# Patient Record
Sex: FEMALE | Race: BLACK OR AFRICAN AMERICAN | Employment: OTHER | URBAN - METROPOLITAN AREA
[De-identification: names, ages, dates, MRNs, and addresses within clinical notes are randomized per-mention and may not be internally consistent; named-entity substitution may affect disease eponyms.]

---

## 2021-09-15 ENCOUNTER — HOSPITAL ENCOUNTER (INPATIENT)
Age: 71
LOS: 20 days | Discharge: NURSING, REHAB OR LONG-TERM CARE | DRG: 870 | End: 2021-10-05
Attending: EMERGENCY MEDICINE | Admitting: INTERNAL MEDICINE
Payer: MEDICARE

## 2021-09-15 ENCOUNTER — APPOINTMENT (OUTPATIENT)
Dept: GENERAL RADIOLOGY | Age: 71
DRG: 870 | End: 2021-09-15
Payer: MEDICARE

## 2021-09-15 DIAGNOSIS — Z20.822 SUSPECTED COVID-19 VIRUS INFECTION: ICD-10-CM

## 2021-09-15 DIAGNOSIS — J18.9 PNEUMONIA OF LEFT LOWER LOBE DUE TO INFECTIOUS ORGANISM: Primary | ICD-10-CM

## 2021-09-15 DIAGNOSIS — E87.20 METABOLIC ACIDOSIS: ICD-10-CM

## 2021-09-15 DIAGNOSIS — J96.01 ACUTE RESPIRATORY FAILURE WITH HYPOXIA (HCC): ICD-10-CM

## 2021-09-15 DIAGNOSIS — N17.9 ACUTE RENAL FAILURE, UNSPECIFIED ACUTE RENAL FAILURE TYPE (HCC): ICD-10-CM

## 2021-09-15 LAB
ANION GAP SERPL CALCULATED.3IONS-SCNC: 26 MMOL/L (ref 3–16)
BACTERIA: ABNORMAL /HPF
BASE EXCESS VENOUS: -14.7 MMOL/L (ref -2–3)
BASE EXCESS VENOUS: -16.3 MMOL/L (ref -2–3)
BASOPHILS ABSOLUTE: 0 K/UL (ref 0–0.2)
BASOPHILS RELATIVE PERCENT: 0.2 %
BILIRUBIN URINE: NEGATIVE
BLOOD, URINE: ABNORMAL
BUN BLDV-MCNC: 77 MG/DL (ref 7–20)
CALCIUM SERPL-MCNC: 8.7 MG/DL (ref 8.3–10.6)
CARBOXYHEMOGLOBIN: 0.8 % (ref 0–1.5)
CARBOXYHEMOGLOBIN: 1.1 % (ref 0–1.5)
CHLORIDE BLD-SCNC: 92 MMOL/L (ref 99–110)
CLARITY: ABNORMAL
CO2: 8 MMOL/L (ref 21–32)
COLOR: YELLOW
CREAT SERPL-MCNC: 7.8 MG/DL (ref 0.6–1.2)
EOSINOPHILS ABSOLUTE: 0 K/UL (ref 0–0.6)
EOSINOPHILS RELATIVE PERCENT: 0 %
EPITHELIAL CELLS, UA: ABNORMAL /HPF (ref 0–5)
GFR AFRICAN AMERICAN: 6
GFR NON-AFRICAN AMERICAN: 5
GLUCOSE BLD-MCNC: 199 MG/DL (ref 70–99)
GLUCOSE URINE: NEGATIVE MG/DL
HCO3 VENOUS: 11.8 MMOL/L (ref 24–28)
HCO3 VENOUS: 12.7 MMOL/L (ref 24–28)
HCT VFR BLD CALC: 36.8 % (ref 36–48)
HEMOGLOBIN, VEN, REDUCED: 40 %
HEMOGLOBIN: 11.9 G/DL (ref 12–16)
KETONES, URINE: NEGATIVE MG/DL
LACTIC ACID, SEPSIS: 2.1 MMOL/L (ref 0.4–1.9)
LACTIC ACID, SEPSIS: 3.2 MMOL/L (ref 0.4–1.9)
LEUKOCYTE ESTERASE, URINE: ABNORMAL
LIPASE: 214 U/L (ref 13–60)
LYMPHOCYTES ABSOLUTE: 0.2 K/UL (ref 1–5.1)
LYMPHOCYTES RELATIVE PERCENT: 1.3 %
MCH RBC QN AUTO: 31.3 PG (ref 26–34)
MCHC RBC AUTO-ENTMCNC: 32.5 G/DL (ref 31–36)
MCV RBC AUTO: 96.5 FL (ref 80–100)
METHEMOGLOBIN VENOUS: 0.1 % (ref 0–1.5)
METHEMOGLOBIN VENOUS: 0.1 % (ref 0–1.5)
MICROSCOPIC EXAMINATION: YES
MONOCYTES ABSOLUTE: 0.9 K/UL (ref 0–1.3)
MONOCYTES RELATIVE PERCENT: 5 %
MUCUS: ABNORMAL /LPF
NEUTROPHILS ABSOLUTE: 16.9 K/UL (ref 1.7–7.7)
NEUTROPHILS RELATIVE PERCENT: 93.5 %
NITRITE, URINE: NEGATIVE
O2 SAT, VEN: 54 %
O2 SAT, VEN: 60 %
PCO2, VEN: 34.9 MMHG (ref 41–51)
PCO2, VEN: 35 MMHG (ref 41–51)
PDW BLD-RTO: 16.4 % (ref 12.4–15.4)
PH UA: 6 (ref 5–8)
PH VENOUS: 7.14 (ref 7.35–7.45)
PH VENOUS: 7.17 (ref 7.35–7.45)
PLATELET # BLD: 320 K/UL (ref 135–450)
PMV BLD AUTO: 8.5 FL (ref 5–10.5)
PO2, VEN: 36.1 MMHG (ref 25–40)
PO2, VEN: 36.9 MMHG (ref 25–40)
POTASSIUM REFLEX MAGNESIUM: 4.5 MMOL/L (ref 3.5–5.1)
PRO-BNP: ABNORMAL PG/ML (ref 0–124)
PROTEIN UA: >=300 MG/DL
RBC # BLD: 3.81 M/UL (ref 4–5.2)
RBC UA: ABNORMAL /HPF (ref 0–4)
SODIUM BLD-SCNC: 126 MMOL/L (ref 136–145)
SPECIFIC GRAVITY UA: 1.02 (ref 1–1.03)
TCO2 CALC VENOUS: 13 MMOL/L
TCO2 CALC VENOUS: 14 MMOL/L
TOTAL CK: 851 U/L (ref 26–192)
TROPONIN: 0.14 NG/ML
URINE TYPE: ABNORMAL
UROBILINOGEN, URINE: 0.2 E.U./DL
WBC # BLD: 18.1 K/UL (ref 4–11)
WBC UA: >100 /HPF (ref 0–5)

## 2021-09-15 PROCEDURE — 96361 HYDRATE IV INFUSION ADD-ON: CPT

## 2021-09-15 PROCEDURE — 81001 URINALYSIS AUTO W/SCOPE: CPT

## 2021-09-15 PROCEDURE — 2500000003 HC RX 250 WO HCPCS

## 2021-09-15 PROCEDURE — 94660 CPAP INITIATION&MGMT: CPT

## 2021-09-15 PROCEDURE — U0005 INFEC AGEN DETEC AMPLI PROBE: HCPCS

## 2021-09-15 PROCEDURE — 2000000000 HC ICU R&B

## 2021-09-15 PROCEDURE — 36556 INSERT NON-TUNNEL CV CATH: CPT

## 2021-09-15 PROCEDURE — 86850 RBC ANTIBODY SCREEN: CPT

## 2021-09-15 PROCEDURE — 6360000002 HC RX W HCPCS: Performed by: STUDENT IN AN ORGANIZED HEALTH CARE EDUCATION/TRAINING PROGRAM

## 2021-09-15 PROCEDURE — 87186 SC STD MICRODIL/AGAR DIL: CPT

## 2021-09-15 PROCEDURE — 71045 X-RAY EXAM CHEST 1 VIEW: CPT

## 2021-09-15 PROCEDURE — 02HV33Z INSERTION OF INFUSION DEVICE INTO SUPERIOR VENA CAVA, PERCUTANEOUS APPROACH: ICD-10-PCS | Performed by: SURGERY

## 2021-09-15 PROCEDURE — 96365 THER/PROPH/DIAG IV INF INIT: CPT

## 2021-09-15 PROCEDURE — 86901 BLOOD TYPING SEROLOGIC RH(D): CPT

## 2021-09-15 PROCEDURE — 84484 ASSAY OF TROPONIN QUANT: CPT

## 2021-09-15 PROCEDURE — 85025 COMPLETE CBC W/AUTO DIFF WBC: CPT

## 2021-09-15 PROCEDURE — 96375 TX/PRO/DX INJ NEW DRUG ADDON: CPT

## 2021-09-15 PROCEDURE — 87077 CULTURE AEROBIC IDENTIFY: CPT

## 2021-09-15 PROCEDURE — 87040 BLOOD CULTURE FOR BACTERIA: CPT

## 2021-09-15 PROCEDURE — 99284 EMERGENCY DEPT VISIT MOD MDM: CPT

## 2021-09-15 PROCEDURE — 82803 BLOOD GASES ANY COMBINATION: CPT

## 2021-09-15 PROCEDURE — 2500000003 HC RX 250 WO HCPCS: Performed by: SURGERY

## 2021-09-15 PROCEDURE — 82550 ASSAY OF CK (CPK): CPT

## 2021-09-15 PROCEDURE — 94640 AIRWAY INHALATION TREATMENT: CPT

## 2021-09-15 PROCEDURE — 2500000003 HC RX 250 WO HCPCS: Performed by: STUDENT IN AN ORGANIZED HEALTH CARE EDUCATION/TRAINING PROGRAM

## 2021-09-15 PROCEDURE — 83690 ASSAY OF LIPASE: CPT

## 2021-09-15 PROCEDURE — 2580000003 HC RX 258: Performed by: SURGERY

## 2021-09-15 PROCEDURE — 86900 BLOOD TYPING SEROLOGIC ABO: CPT

## 2021-09-15 PROCEDURE — 96368 THER/DIAG CONCURRENT INF: CPT

## 2021-09-15 PROCEDURE — 36415 COLL VENOUS BLD VENIPUNCTURE: CPT

## 2021-09-15 PROCEDURE — 83880 ASSAY OF NATRIURETIC PEPTIDE: CPT

## 2021-09-15 PROCEDURE — XW033E5 INTRODUCTION OF REMDESIVIR ANTI-INFECTIVE INTO PERIPHERAL VEIN, PERCUTANEOUS APPROACH, NEW TECHNOLOGY GROUP 5: ICD-10-PCS | Performed by: SURGERY

## 2021-09-15 PROCEDURE — 80048 BASIC METABOLIC PNL TOTAL CA: CPT

## 2021-09-15 PROCEDURE — 87086 URINE CULTURE/COLONY COUNT: CPT

## 2021-09-15 PROCEDURE — 2580000003 HC RX 258: Performed by: STUDENT IN AN ORGANIZED HEALTH CARE EDUCATION/TRAINING PROGRAM

## 2021-09-15 PROCEDURE — U0003 INFECTIOUS AGENT DETECTION BY NUCLEIC ACID (DNA OR RNA); SEVERE ACUTE RESPIRATORY SYNDROME CORONAVIRUS 2 (SARS-COV-2) (CORONAVIRUS DISEASE [COVID-19]), AMPLIFIED PROBE TECHNIQUE, MAKING USE OF HIGH THROUGHPUT TECHNOLOGIES AS DESCRIBED BY CMS-2020-01-R: HCPCS

## 2021-09-15 PROCEDURE — 6370000000 HC RX 637 (ALT 250 FOR IP): Performed by: STUDENT IN AN ORGANIZED HEALTH CARE EDUCATION/TRAINING PROGRAM

## 2021-09-15 PROCEDURE — 83605 ASSAY OF LACTIC ACID: CPT

## 2021-09-15 PROCEDURE — 5A1D90Z PERFORMANCE OF URINARY FILTRATION, CONTINUOUS, GREATER THAN 18 HOURS PER DAY: ICD-10-PCS | Performed by: HOSPITALIST

## 2021-09-15 RX ORDER — SODIUM CHLORIDE 9 MG/ML
25 INJECTION, SOLUTION INTRAVENOUS PRN
Status: DISCONTINUED | OUTPATIENT
Start: 2021-09-15 | End: 2021-10-05 | Stop reason: HOSPADM

## 2021-09-15 RX ORDER — ACETAMINOPHEN 325 MG/1
650 TABLET ORAL EVERY 6 HOURS PRN
Status: DISCONTINUED | OUTPATIENT
Start: 2021-09-15 | End: 2021-10-05 | Stop reason: HOSPADM

## 2021-09-15 RX ORDER — IPRATROPIUM BROMIDE AND ALBUTEROL SULFATE 2.5; .5 MG/3ML; MG/3ML
1 SOLUTION RESPIRATORY (INHALATION) ONCE
Status: COMPLETED | OUTPATIENT
Start: 2021-09-15 | End: 2021-09-15

## 2021-09-15 RX ORDER — IBUPROFEN 600 MG/1
TABLET ORAL
Status: ON HOLD | COMMUNITY
Start: 2021-08-02 | End: 2021-10-05 | Stop reason: HOSPADM

## 2021-09-15 RX ORDER — DEXAMETHASONE SODIUM PHOSPHATE 4 MG/ML
6 INJECTION, SOLUTION INTRA-ARTICULAR; INTRALESIONAL; INTRAMUSCULAR; INTRAVENOUS; SOFT TISSUE ONCE
Status: COMPLETED | OUTPATIENT
Start: 2021-09-15 | End: 2021-09-15

## 2021-09-15 RX ORDER — ATENOLOL 25 MG/1
25 TABLET ORAL DAILY
COMMUNITY
End: 2022-04-12 | Stop reason: SDUPTHER

## 2021-09-15 RX ORDER — SODIUM CHLORIDE 0.9 % (FLUSH) 0.9 %
5-40 SYRINGE (ML) INJECTION EVERY 12 HOURS SCHEDULED
Status: DISCONTINUED | OUTPATIENT
Start: 2021-09-15 | End: 2021-10-05 | Stop reason: HOSPADM

## 2021-09-15 RX ORDER — POLYETHYLENE GLYCOL 3350 17 G/17G
17 POWDER, FOR SOLUTION ORAL DAILY PRN
Status: DISCONTINUED | OUTPATIENT
Start: 2021-09-15 | End: 2021-10-05 | Stop reason: HOSPADM

## 2021-09-15 RX ORDER — ALBUTEROL SULFATE 90 UG/1
AEROSOL, METERED RESPIRATORY (INHALATION)
COMMUNITY
Start: 2021-09-07 | End: 2022-06-27 | Stop reason: ALTCHOICE

## 2021-09-15 RX ORDER — SODIUM CHLORIDE 0.9 % (FLUSH) 0.9 %
5-40 SYRINGE (ML) INJECTION PRN
Status: DISCONTINUED | OUTPATIENT
Start: 2021-09-15 | End: 2021-10-05 | Stop reason: HOSPADM

## 2021-09-15 RX ORDER — NITROGLYCERIN 20 MG/100ML
5-200 INJECTION INTRAVENOUS CONTINUOUS
Status: DISCONTINUED | OUTPATIENT
Start: 2021-09-15 | End: 2021-09-16

## 2021-09-15 RX ORDER — ACETAMINOPHEN 650 MG/1
650 SUPPOSITORY RECTAL EVERY 6 HOURS PRN
Status: DISCONTINUED | OUTPATIENT
Start: 2021-09-15 | End: 2021-10-05 | Stop reason: HOSPADM

## 2021-09-15 RX ORDER — ONDANSETRON 2 MG/ML
4 INJECTION INTRAMUSCULAR; INTRAVENOUS EVERY 6 HOURS PRN
Status: DISCONTINUED | OUTPATIENT
Start: 2021-09-15 | End: 2021-10-05 | Stop reason: HOSPADM

## 2021-09-15 RX ORDER — ALBUTEROL SULFATE 2.5 MG/3ML
2.5 SOLUTION RESPIRATORY (INHALATION) ONCE
Status: COMPLETED | OUTPATIENT
Start: 2021-09-15 | End: 2021-09-15

## 2021-09-15 RX ORDER — 0.9 % SODIUM CHLORIDE 0.9 %
1000 INTRAVENOUS SOLUTION INTRAVENOUS ONCE
Status: COMPLETED | OUTPATIENT
Start: 2021-09-15 | End: 2021-09-15

## 2021-09-15 RX ORDER — ONDANSETRON 4 MG/1
4 TABLET, ORALLY DISINTEGRATING ORAL EVERY 8 HOURS PRN
Status: DISCONTINUED | OUTPATIENT
Start: 2021-09-15 | End: 2021-10-05 | Stop reason: HOSPADM

## 2021-09-15 RX ORDER — HEPARIN SODIUM 5000 [USP'U]/ML
5000 INJECTION, SOLUTION INTRAVENOUS; SUBCUTANEOUS EVERY 8 HOURS SCHEDULED
Status: DISCONTINUED | OUTPATIENT
Start: 2021-09-15 | End: 2021-10-05 | Stop reason: HOSPADM

## 2021-09-15 RX ADMIN — IPRATROPIUM BROMIDE AND ALBUTEROL SULFATE 1 AMPULE: .5; 3 SOLUTION RESPIRATORY (INHALATION) at 20:33

## 2021-09-15 RX ADMIN — NITROGLYCERIN 5 MCG/MIN: 20 INJECTION INTRAVENOUS at 21:50

## 2021-09-15 RX ADMIN — SODIUM BICARBONATE 50 MEQ: 84 INJECTION, SOLUTION INTRAVENOUS at 21:19

## 2021-09-15 RX ADMIN — VANCOMYCIN HYDROCHLORIDE 1500 MG: 10 INJECTION, POWDER, LYOPHILIZED, FOR SOLUTION INTRAVENOUS at 23:09

## 2021-09-15 RX ADMIN — CEFTRIAXONE SODIUM 2000 MG: 2 INJECTION, POWDER, FOR SOLUTION INTRAMUSCULAR; INTRAVENOUS at 20:43

## 2021-09-15 RX ADMIN — SODIUM BICARBONATE: 84 INJECTION, SOLUTION INTRAVENOUS at 22:22

## 2021-09-15 RX ADMIN — SODIUM CHLORIDE 1000 ML: 9 INJECTION, SOLUTION INTRAVENOUS at 20:36

## 2021-09-15 RX ADMIN — Medication 50 MEQ: at 21:19

## 2021-09-15 RX ADMIN — DEXAMETHASONE SODIUM PHOSPHATE 6 MG: 4 INJECTION, SOLUTION INTRAMUSCULAR; INTRAVENOUS at 20:38

## 2021-09-15 RX ADMIN — ALBUTEROL SULFATE 2.5 MG: 2.5 SOLUTION RESPIRATORY (INHALATION) at 20:33

## 2021-09-15 RX ADMIN — AZITHROMYCIN MONOHYDRATE 500 MG: 500 INJECTION, POWDER, LYOPHILIZED, FOR SOLUTION INTRAVENOUS at 20:39

## 2021-09-16 ENCOUNTER — APPOINTMENT (OUTPATIENT)
Dept: GENERAL RADIOLOGY | Age: 71
DRG: 870 | End: 2021-09-16
Payer: MEDICARE

## 2021-09-16 LAB
A/G RATIO: 0.5 (ref 1.1–2.2)
A/G RATIO: 0.6 (ref 1.1–2.2)
ABO/RH: NORMAL
ALBUMIN SERPL-MCNC: 2.1 G/DL (ref 3.4–5)
ALBUMIN SERPL-MCNC: 2.6 G/DL (ref 3.4–5)
ALBUMIN SERPL-MCNC: 2.7 G/DL (ref 3.4–5)
ALP BLD-CCNC: 103 U/L (ref 40–129)
ALP BLD-CCNC: 116 U/L (ref 40–129)
ALP BLD-CCNC: 94 U/L (ref 40–129)
ALT SERPL-CCNC: 18 U/L (ref 10–40)
ALT SERPL-CCNC: 19 U/L (ref 10–40)
ALT SERPL-CCNC: 20 U/L (ref 10–40)
ANION GAP SERPL CALCULATED.3IONS-SCNC: 17 MMOL/L (ref 3–16)
ANION GAP SERPL CALCULATED.3IONS-SCNC: 21 MMOL/L (ref 3–16)
ANION GAP SERPL CALCULATED.3IONS-SCNC: 22 MMOL/L (ref 3–16)
ANION GAP SERPL CALCULATED.3IONS-SCNC: 26 MMOL/L (ref 3–16)
ANISOCYTOSIS: ABNORMAL
ANTIBODY IDENTIFICATION: NORMAL
ANTIBODY SCREEN: NORMAL
AST SERPL-CCNC: 37 U/L (ref 15–37)
AST SERPL-CCNC: 39 U/L (ref 15–37)
AST SERPL-CCNC: 40 U/L (ref 15–37)
BANDED NEUTROPHILS RELATIVE PERCENT: 2 % (ref 0–7)
BASE EXCESS ARTERIAL: -11 (ref -3–3)
BASE EXCESS ARTERIAL: -12.8 MMOL/L (ref -3–3)
BASE EXCESS ARTERIAL: -4 (ref -3–3)
BASE EXCESS ARTERIAL: 2 (ref -3–3)
BASE EXCESS VENOUS: -7.1 MMOL/L (ref -2–3)
BASOPHILS ABSOLUTE: 0 K/UL (ref 0–0.2)
BASOPHILS RELATIVE PERCENT: 0 %
BILIRUB SERPL-MCNC: <0.2 MG/DL (ref 0–1)
BILIRUBIN DIRECT: <0.2 MG/DL (ref 0–0.3)
BILIRUBIN, INDIRECT: ABNORMAL MG/DL (ref 0–1)
BUN BLDV-MCNC: 41 MG/DL (ref 7–20)
BUN BLDV-MCNC: 59 MG/DL (ref 7–20)
BUN BLDV-MCNC: 76 MG/DL (ref 7–20)
BUN BLDV-MCNC: 77 MG/DL (ref 7–20)
C-REACTIVE PROTEIN: 242.9 MG/L (ref 0–5.1)
CALCIUM IONIZED: 0.92 MMOL/L (ref 1.12–1.32)
CALCIUM IONIZED: 0.92 MMOL/L (ref 1.12–1.32)
CALCIUM IONIZED: 0.94 MMOL/L (ref 1.12–1.32)
CALCIUM SERPL-MCNC: 7.4 MG/DL (ref 8.3–10.6)
CALCIUM SERPL-MCNC: 7.4 MG/DL (ref 8.3–10.6)
CALCIUM SERPL-MCNC: 7.6 MG/DL (ref 8.3–10.6)
CALCIUM SERPL-MCNC: 7.8 MG/DL (ref 8.3–10.6)
CARBOXYHEMOGLOBIN ARTERIAL: 0.1 % (ref 0–1.5)
CARBOXYHEMOGLOBIN: 0.6 % (ref 0–1.5)
CHLORIDE BLD-SCNC: 89 MMOL/L (ref 99–110)
CHLORIDE BLD-SCNC: 91 MMOL/L (ref 99–110)
CHLORIDE BLD-SCNC: 94 MMOL/L (ref 99–110)
CHLORIDE BLD-SCNC: 95 MMOL/L (ref 99–110)
CO2: 16 MMOL/L (ref 21–32)
CO2: 19 MMOL/L (ref 21–32)
CO2: 19 MMOL/L (ref 21–32)
CO2: 25 MMOL/L (ref 21–32)
CREAT SERPL-MCNC: 3.8 MG/DL (ref 0.6–1.2)
CREAT SERPL-MCNC: 5.4 MG/DL (ref 0.6–1.2)
CREAT SERPL-MCNC: 7.4 MG/DL (ref 0.6–1.2)
CREAT SERPL-MCNC: 7.6 MG/DL (ref 0.6–1.2)
D DIMER: 2265 NG/ML DDU (ref 0–229)
DAT IGG CAPTURE: NORMAL
EOSINOPHILS ABSOLUTE: 0 K/UL (ref 0–0.6)
EOSINOPHILS RELATIVE PERCENT: 0 %
FERRITIN: 654.4 NG/ML (ref 15–150)
FIBRINOGEN: 875 MG/DL (ref 200–397)
GFR AFRICAN AMERICAN: 14
GFR AFRICAN AMERICAN: 6
GFR AFRICAN AMERICAN: 7
GFR AFRICAN AMERICAN: 9
GFR NON-AFRICAN AMERICAN: 12
GFR NON-AFRICAN AMERICAN: 5
GFR NON-AFRICAN AMERICAN: 5
GFR NON-AFRICAN AMERICAN: 8
GLOBULIN: 4.3 G/DL
GLOBULIN: 4.5 G/DL
GLUCOSE BLD-MCNC: 172 MG/DL (ref 70–99)
GLUCOSE BLD-MCNC: 177 MG/DL (ref 70–99)
GLUCOSE BLD-MCNC: 194 MG/DL (ref 70–99)
GLUCOSE BLD-MCNC: 196 MG/DL (ref 70–99)
GLUCOSE BLD-MCNC: 216 MG/DL (ref 70–99)
GLUCOSE BLD-MCNC: 221 MG/DL (ref 70–99)
GLUCOSE BLD-MCNC: 227 MG/DL (ref 70–99)
GLUCOSE BLD-MCNC: 229 MG/DL (ref 70–99)
HCO3 ARTERIAL: 16 MMOL/L (ref 21–29)
HCO3 ARTERIAL: 16.6 MMOL/L (ref 21–29)
HCO3 ARTERIAL: 21.5 MMOL/L (ref 21–29)
HCO3 ARTERIAL: 25.4 MMOL/L (ref 21–29)
HCO3 VENOUS: 21 MMOL/L (ref 24–28)
HCT VFR BLD CALC: 30 % (ref 36–48)
HEMOGLOBIN, ART, EXTENDED: 10.8 G/DL
HEMOGLOBIN, VEN, REDUCED: 22.2 %
HEMOGLOBIN: 10.1 G/DL (ref 12–16)
LACTATE: 1.7 MMOL/L (ref 0.4–2)
LACTATE: 1.75 MMOL/L (ref 0.4–2)
LYMPHOCYTES ABSOLUTE: 0.6 K/UL (ref 1–5.1)
LYMPHOCYTES RELATIVE PERCENT: 2 %
MAGNESIUM: 1.4 MG/DL (ref 1.8–2.4)
MCH RBC QN AUTO: 30.9 PG (ref 26–34)
MCHC RBC AUTO-ENTMCNC: 33.8 G/DL (ref 31–36)
MCV RBC AUTO: 91.6 FL (ref 80–100)
METAMYELOCYTES RELATIVE PERCENT: 1 %
METHEMOGLOBIN ARTERIAL: 0.3 % (ref 0–1.4)
METHEMOGLOBIN VENOUS: 0.3 % (ref 0–1.5)
MONOCYTES ABSOLUTE: 0 K/UL (ref 0–1.3)
MONOCYTES RELATIVE PERCENT: 0 %
NEUTROPHILS ABSOLUTE: 29 K/UL (ref 1.7–7.7)
NEUTROPHILS RELATIVE PERCENT: 95 %
O2 SAT, ARTERIAL: 100 % (ref 93–100)
O2 SAT, ARTERIAL: 99 % (ref 93–100)
O2 SAT, VEN: 78 %
PCO2 ARTERIAL: 36 MM HG (ref 35–45)
PCO2 ARTERIAL: 36 MM HG (ref 35–45)
PCO2 ARTERIAL: 38.6 MM HG (ref 35–45)
PCO2 ARTERIAL: 45.5 MMHG (ref 35–45)
PCO2, VEN: 54.6 MMHG (ref 41–51)
PDW BLD-RTO: 16.1 % (ref 12.4–15.4)
PERFORMED ON: ABNORMAL
PH ARTERIAL: 7.14 (ref 7.35–7.45)
PH ARTERIAL: 7.24 (ref 7.35–7.45)
PH ARTERIAL: 7.38 (ref 7.35–7.45)
PH ARTERIAL: 7.46 (ref 7.35–7.45)
PH VENOUS: 7.19 (ref 7.35–7.45)
PH VENOUS: 7.39 (ref 7.35–7.45)
PHOSPHORUS: 4.6 MG/DL (ref 2.5–4.9)
PHOSPHORUS: 5.8 MG/DL (ref 2.5–4.9)
PLATELET # BLD: 245 K/UL (ref 135–450)
PMV BLD AUTO: 8.1 FL (ref 5–10.5)
PO2 ARTERIAL: 118.6 MM HG (ref 75–108)
PO2 ARTERIAL: 142.9 MM HG (ref 75–108)
PO2 ARTERIAL: 178 MMHG (ref 75–108)
PO2 ARTERIAL: 302 MM HG (ref 75–108)
PO2, VEN: 52.3 MMHG (ref 25–40)
POC POTASSIUM: 3.7 MMOL/L (ref 3.5–5.1)
POC POTASSIUM: 3.7 MMOL/L (ref 3.5–5.1)
POC SAMPLE TYPE: ABNORMAL
POC SODIUM: 133 MMOL/L (ref 136–145)
POTASSIUM REFLEX MAGNESIUM: 4.1 MMOL/L (ref 3.5–5.1)
POTASSIUM SERPL-SCNC: 3.6 MMOL/L (ref 3.5–5.1)
POTASSIUM SERPL-SCNC: 4.2 MMOL/L (ref 3.5–5.1)
POTASSIUM SERPL-SCNC: 4.2 MMOL/L (ref 3.5–5.1)
PROCALCITONIN: 69.81 NG/ML (ref 0–0.15)
RBC # BLD: 3.27 M/UL (ref 4–5.2)
SARS-COV-2: DETECTED
SCHISTOCYTES: ABNORMAL
SODIUM BLD-SCNC: 131 MMOL/L (ref 136–145)
SODIUM BLD-SCNC: 132 MMOL/L (ref 136–145)
SODIUM BLD-SCNC: 134 MMOL/L (ref 136–145)
SODIUM BLD-SCNC: 137 MMOL/L (ref 136–145)
TCO2 ARTERIAL: 17 MMOL/L
TCO2 ARTERIAL: 18 MMOL/L
TCO2 ARTERIAL: 23 MMOL/L
TCO2 ARTERIAL: 27 MMOL/L
TCO2 CALC VENOUS: 23 MMOL/L
TEAR DROP CELLS: ABNORMAL
TOTAL PROTEIN: 6.6 G/DL (ref 6.4–8.2)
TOTAL PROTEIN: 7 G/DL (ref 6.4–8.2)
TOTAL PROTEIN: 7.4 G/DL (ref 6.4–8.2)
TROPONIN: 0.1 NG/ML
VANCOMYCIN RANDOM: 17.2 UG/ML
WBC # BLD: 29.6 K/UL (ref 4–11)

## 2021-09-16 PROCEDURE — 36556 INSERT NON-TUNNEL CV CATH: CPT

## 2021-09-16 PROCEDURE — 94002 VENT MGMT INPAT INIT DAY: CPT

## 2021-09-16 PROCEDURE — 83735 ASSAY OF MAGNESIUM: CPT

## 2021-09-16 PROCEDURE — 83605 ASSAY OF LACTIC ACID: CPT

## 2021-09-16 PROCEDURE — 0BH17EZ INSERTION OF ENDOTRACHEAL AIRWAY INTO TRACHEA, VIA NATURAL OR ARTIFICIAL OPENING: ICD-10-PCS | Performed by: STUDENT IN AN ORGANIZED HEALTH CARE EDUCATION/TRAINING PROGRAM

## 2021-09-16 PROCEDURE — 6360000002 HC RX W HCPCS

## 2021-09-16 PROCEDURE — 2500000003 HC RX 250 WO HCPCS

## 2021-09-16 PROCEDURE — 82947 ASSAY GLUCOSE BLOOD QUANT: CPT

## 2021-09-16 PROCEDURE — 2580000003 HC RX 258: Performed by: STUDENT IN AN ORGANIZED HEALTH CARE EDUCATION/TRAINING PROGRAM

## 2021-09-16 PROCEDURE — 2500000003 HC RX 250 WO HCPCS: Performed by: SURGERY

## 2021-09-16 PROCEDURE — 71045 X-RAY EXAM CHEST 1 VIEW: CPT

## 2021-09-16 PROCEDURE — 84132 ASSAY OF SERUM POTASSIUM: CPT

## 2021-09-16 PROCEDURE — 82728 ASSAY OF FERRITIN: CPT

## 2021-09-16 PROCEDURE — 6360000002 HC RX W HCPCS: Performed by: STUDENT IN AN ORGANIZED HEALTH CARE EDUCATION/TRAINING PROGRAM

## 2021-09-16 PROCEDURE — 82803 BLOOD GASES ANY COMBINATION: CPT

## 2021-09-16 PROCEDURE — 2580000003 HC RX 258: Performed by: HOSPITALIST

## 2021-09-16 PROCEDURE — 87641 MR-STAPH DNA AMP PROBE: CPT

## 2021-09-16 PROCEDURE — 36620 INSERTION CATHETER ARTERY: CPT

## 2021-09-16 PROCEDURE — 2580000003 HC RX 258: Performed by: SURGERY

## 2021-09-16 PROCEDURE — 90945 DIALYSIS ONE EVALUATION: CPT | Performed by: INTERNAL MEDICINE

## 2021-09-16 PROCEDURE — 84145 PROCALCITONIN (PCT): CPT

## 2021-09-16 PROCEDURE — 80202 ASSAY OF VANCOMYCIN: CPT

## 2021-09-16 PROCEDURE — 02HV33Z INSERTION OF INFUSION DEVICE INTO SUPERIOR VENA CAVA, PERCUTANEOUS APPROACH: ICD-10-PCS | Performed by: SURGERY

## 2021-09-16 PROCEDURE — 2500000003 HC RX 250 WO HCPCS: Performed by: HOSPITALIST

## 2021-09-16 PROCEDURE — 85379 FIBRIN DEGRADATION QUANT: CPT

## 2021-09-16 PROCEDURE — 6370000000 HC RX 637 (ALT 250 FOR IP): Performed by: STUDENT IN AN ORGANIZED HEALTH CARE EDUCATION/TRAINING PROGRAM

## 2021-09-16 PROCEDURE — 82330 ASSAY OF CALCIUM: CPT

## 2021-09-16 PROCEDURE — 84295 ASSAY OF SERUM SODIUM: CPT

## 2021-09-16 PROCEDURE — 99291 CRITICAL CARE FIRST HOUR: CPT | Performed by: INTERNAL MEDICINE

## 2021-09-16 PROCEDURE — 6360000002 HC RX W HCPCS: Performed by: HOSPITALIST

## 2021-09-16 PROCEDURE — 6360000002 HC RX W HCPCS: Performed by: SURGERY

## 2021-09-16 PROCEDURE — 86870 RBC ANTIBODY IDENTIFICATION: CPT

## 2021-09-16 PROCEDURE — 86140 C-REACTIVE PROTEIN: CPT

## 2021-09-16 PROCEDURE — 83036 HEMOGLOBIN GLYCOSYLATED A1C: CPT

## 2021-09-16 PROCEDURE — 5A1955Z RESPIRATORY VENTILATION, GREATER THAN 96 CONSECUTIVE HOURS: ICD-10-PCS | Performed by: STUDENT IN AN ORGANIZED HEALTH CARE EDUCATION/TRAINING PROGRAM

## 2021-09-16 PROCEDURE — 2000000000 HC ICU R&B

## 2021-09-16 PROCEDURE — 2500000003 HC RX 250 WO HCPCS: Performed by: STUDENT IN AN ORGANIZED HEALTH CARE EDUCATION/TRAINING PROGRAM

## 2021-09-16 PROCEDURE — 85025 COMPLETE CBC W/AUTO DIFF WBC: CPT

## 2021-09-16 PROCEDURE — 86880 COOMBS TEST DIRECT: CPT

## 2021-09-16 PROCEDURE — 86922 COMPATIBILITY TEST ANTIGLOB: CPT

## 2021-09-16 PROCEDURE — 99223 1ST HOSP IP/OBS HIGH 75: CPT | Performed by: INTERNAL MEDICINE

## 2021-09-16 PROCEDURE — 94660 CPAP INITIATION&MGMT: CPT

## 2021-09-16 PROCEDURE — 83883 ASSAY NEPHELOMETRY NOT SPEC: CPT

## 2021-09-16 PROCEDURE — 84100 ASSAY OF PHOSPHORUS: CPT

## 2021-09-16 PROCEDURE — 94761 N-INVAS EAR/PLS OXIMETRY MLT: CPT

## 2021-09-16 PROCEDURE — 80053 COMPREHEN METABOLIC PANEL: CPT

## 2021-09-16 PROCEDURE — 90945 DIALYSIS ONE EVALUATION: CPT

## 2021-09-16 PROCEDURE — 2700000000 HC OXYGEN THERAPY PER DAY

## 2021-09-16 PROCEDURE — 85384 FIBRINOGEN ACTIVITY: CPT

## 2021-09-16 PROCEDURE — 36415 COLL VENOUS BLD VENIPUNCTURE: CPT

## 2021-09-16 PROCEDURE — 84484 ASSAY OF TROPONIN QUANT: CPT

## 2021-09-16 RX ORDER — DEXTROSE MONOHYDRATE 25 G/50ML
12.5 INJECTION, SOLUTION INTRAVENOUS PRN
Status: DISCONTINUED | OUTPATIENT
Start: 2021-09-16 | End: 2021-10-05 | Stop reason: HOSPADM

## 2021-09-16 RX ORDER — 0.9 % SODIUM CHLORIDE 0.9 %
30 INTRAVENOUS SOLUTION INTRAVENOUS PRN
Status: DISCONTINUED | OUTPATIENT
Start: 2021-09-16 | End: 2021-09-17

## 2021-09-16 RX ORDER — SUCCINYLCHOLINE CHLORIDE 20 MG/ML
INJECTION INTRAMUSCULAR; INTRAVENOUS
Status: COMPLETED
Start: 2021-09-16 | End: 2021-09-16

## 2021-09-16 RX ORDER — INSULIN LISPRO 100 [IU]/ML
0-6 INJECTION, SOLUTION INTRAVENOUS; SUBCUTANEOUS
Status: DISCONTINUED | OUTPATIENT
Start: 2021-09-16 | End: 2021-09-16

## 2021-09-16 RX ORDER — DEXAMETHASONE SODIUM PHOSPHATE 4 MG/ML
10 INJECTION, SOLUTION INTRA-ARTICULAR; INTRALESIONAL; INTRAMUSCULAR; INTRAVENOUS; SOFT TISSUE EVERY 24 HOURS
Status: COMPLETED | OUTPATIENT
Start: 2021-09-22 | End: 2021-09-26

## 2021-09-16 RX ORDER — NOREPINEPHRINE BITARTRATE 1 MG/ML
INJECTION, SOLUTION INTRAVENOUS
Status: DISPENSED
Start: 2021-09-16 | End: 2021-09-16

## 2021-09-16 RX ORDER — DEXAMETHASONE SODIUM PHOSPHATE 4 MG/ML
6 INJECTION, SOLUTION INTRA-ARTICULAR; INTRALESIONAL; INTRAMUSCULAR; INTRAVENOUS; SOFT TISSUE DAILY
Status: DISCONTINUED | OUTPATIENT
Start: 2021-09-16 | End: 2021-09-16

## 2021-09-16 RX ORDER — NICOTINE POLACRILEX 4 MG
15 LOZENGE BUCCAL PRN
Status: DISCONTINUED | OUTPATIENT
Start: 2021-09-16 | End: 2021-10-05 | Stop reason: HOSPADM

## 2021-09-16 RX ORDER — INSULIN LISPRO 100 [IU]/ML
0-3 INJECTION, SOLUTION INTRAVENOUS; SUBCUTANEOUS NIGHTLY
Status: DISCONTINUED | OUTPATIENT
Start: 2021-09-16 | End: 2021-09-16

## 2021-09-16 RX ORDER — ETOMIDATE 2 MG/ML
INJECTION INTRAVENOUS
Status: COMPLETED
Start: 2021-09-16 | End: 2021-09-16

## 2021-09-16 RX ORDER — PROPOFOL 10 MG/ML
INJECTION, EMULSION INTRAVENOUS
Status: COMPLETED
Start: 2021-09-16 | End: 2021-09-16

## 2021-09-16 RX ORDER — MAGNESIUM SULFATE IN WATER 40 MG/ML
2000 INJECTION, SOLUTION INTRAVENOUS ONCE
Status: COMPLETED | OUTPATIENT
Start: 2021-09-16 | End: 2021-09-16

## 2021-09-16 RX ORDER — DEXTROSE MONOHYDRATE 50 MG/ML
100 INJECTION, SOLUTION INTRAVENOUS PRN
Status: DISCONTINUED | OUTPATIENT
Start: 2021-09-16 | End: 2021-10-05 | Stop reason: HOSPADM

## 2021-09-16 RX ORDER — PROPOFOL 10 MG/ML
5-50 INJECTION, EMULSION INTRAVENOUS
Status: DISCONTINUED | OUTPATIENT
Start: 2021-09-16 | End: 2021-09-24

## 2021-09-16 RX ORDER — INSULIN LISPRO 100 [IU]/ML
0-6 INJECTION, SOLUTION INTRAVENOUS; SUBCUTANEOUS EVERY 6 HOURS
Status: DISCONTINUED | OUTPATIENT
Start: 2021-09-16 | End: 2021-09-22

## 2021-09-16 RX ADMIN — DEXTROSE MONOHYDRATE 12.5 MCG/HR: 5 INJECTION, SOLUTION INTRAVENOUS at 06:01

## 2021-09-16 RX ADMIN — Medication 10 ML: at 08:55

## 2021-09-16 RX ADMIN — Medication: at 15:11

## 2021-09-16 RX ADMIN — PIPERACILLIN AND TAZOBACTAM 3375 MG: 3; .375 INJECTION, POWDER, LYOPHILIZED, FOR SOLUTION INTRAVENOUS at 06:49

## 2021-09-16 RX ADMIN — VASOPRESSIN 0.04 UNITS/MIN: 20 INJECTION INTRAVENOUS at 13:29

## 2021-09-16 RX ADMIN — PROPOFOL 20 MCG/KG/MIN: 10 INJECTION, EMULSION INTRAVENOUS at 02:09

## 2021-09-16 RX ADMIN — SODIUM BICARBONATE: 84 INJECTION, SOLUTION INTRAVENOUS at 18:07

## 2021-09-16 RX ADMIN — CALCIUM GLUCONATE 1000 MG: 98 INJECTION, SOLUTION INTRAVENOUS at 09:21

## 2021-09-16 RX ADMIN — HEPARIN SODIUM 5000 UNITS: 5000 INJECTION INTRAVENOUS; SUBCUTANEOUS at 21:16

## 2021-09-16 RX ADMIN — Medication: at 19:37

## 2021-09-16 RX ADMIN — HEPARIN SODIUM 5000 UNITS: 5000 INJECTION INTRAVENOUS; SUBCUTANEOUS at 13:41

## 2021-09-16 RX ADMIN — CALCIUM GLUCONATE 1000 MG: 98 INJECTION, SOLUTION INTRAVENOUS at 13:11

## 2021-09-16 RX ADMIN — SODIUM BICARBONATE 50 MEQ: 84 INJECTION, SOLUTION INTRAVENOUS at 01:48

## 2021-09-16 RX ADMIN — VANCOMYCIN HYDROCHLORIDE 1000 MG: 10 INJECTION, POWDER, LYOPHILIZED, FOR SOLUTION INTRAVENOUS at 18:18

## 2021-09-16 RX ADMIN — SODIUM BICARBONATE: 84 INJECTION, SOLUTION INTRAVENOUS at 07:37

## 2021-09-16 RX ADMIN — DEXAMETHASONE SODIUM PHOSPHATE 6 MG: 4 INJECTION, SOLUTION INTRAMUSCULAR; INTRAVENOUS at 08:55

## 2021-09-16 RX ADMIN — Medication 50 MEQ: at 01:48

## 2021-09-16 RX ADMIN — DEXTROSE MONOHYDRATE 5 MCG/MIN: 50 INJECTION, SOLUTION INTRAVENOUS at 08:58

## 2021-09-16 RX ADMIN — REMDESIVIR 200 MG: 100 INJECTION, POWDER, LYOPHILIZED, FOR SOLUTION INTRAVENOUS at 06:11

## 2021-09-16 RX ADMIN — Medication: at 04:47

## 2021-09-16 RX ADMIN — TOCILIZUMAB 400 MG: 20 INJECTION, SOLUTION, CONCENTRATE INTRAVENOUS at 06:32

## 2021-09-16 RX ADMIN — Medication: at 14:33

## 2021-09-16 RX ADMIN — SODIUM BICARBONATE: 84 INJECTION, SOLUTION INTRAVENOUS at 09:11

## 2021-09-16 RX ADMIN — FAMOTIDINE 20 MG: 10 INJECTION, SOLUTION INTRAVENOUS at 13:08

## 2021-09-16 RX ADMIN — INSULIN LISPRO 2 UNITS: 100 INJECTION, SOLUTION INTRAVENOUS; SUBCUTANEOUS at 14:47

## 2021-09-16 RX ADMIN — Medication: at 09:11

## 2021-09-16 RX ADMIN — PROPOFOL 30 MCG/KG/MIN: 10 INJECTION, EMULSION INTRAVENOUS at 23:01

## 2021-09-16 RX ADMIN — PROPOFOL 30 MCG/KG/MIN: 10 INJECTION, EMULSION INTRAVENOUS at 16:15

## 2021-09-16 RX ADMIN — PIPERACILLIN AND TAZOBACTAM 3375 MG: 3; .375 INJECTION, POWDER, LYOPHILIZED, FOR SOLUTION INTRAVENOUS at 23:00

## 2021-09-16 RX ADMIN — SODIUM BICARBONATE: 84 INJECTION, SOLUTION INTRAVENOUS at 22:25

## 2021-09-16 RX ADMIN — CALCIUM GLUCONATE 1000 MG: 98 INJECTION, SOLUTION INTRAVENOUS at 21:06

## 2021-09-16 RX ADMIN — HEPARIN SODIUM 5000 UNITS: 5000 INJECTION INTRAVENOUS; SUBCUTANEOUS at 06:49

## 2021-09-16 RX ADMIN — PIPERACILLIN AND TAZOBACTAM 3375 MG: 3; .375 INJECTION, POWDER, LYOPHILIZED, FOR SOLUTION INTRAVENOUS at 15:59

## 2021-09-16 RX ADMIN — SODIUM BICARBONATE: 84 INJECTION, SOLUTION INTRAVENOUS at 13:48

## 2021-09-16 RX ADMIN — SUCCINYLCHOLINE CHLORIDE 80 MG: 20 INJECTION, SOLUTION INTRAMUSCULAR; INTRAVENOUS at 01:54

## 2021-09-16 RX ADMIN — INSULIN LISPRO 1 UNITS: 100 INJECTION, SOLUTION INTRAVENOUS; SUBCUTANEOUS at 21:16

## 2021-09-16 RX ADMIN — ETOMIDATE 20 MG: 2 INJECTION INTRAVENOUS at 01:53

## 2021-09-16 RX ADMIN — SODIUM BICARBONATE: 84 INJECTION, SOLUTION INTRAVENOUS at 04:48

## 2021-09-16 RX ADMIN — MAGNESIUM SULFATE HEPTAHYDRATE 2000 MG: 40 INJECTION, SOLUTION INTRAVENOUS at 11:45

## 2021-09-16 RX ADMIN — PROPOFOL 50 MCG/KG/MIN: 10 INJECTION, EMULSION INTRAVENOUS at 07:36

## 2021-09-16 RX ADMIN — INSULIN LISPRO 1 UNITS: 100 INJECTION, SOLUTION INTRAVENOUS; SUBCUTANEOUS at 09:01

## 2021-09-16 ASSESSMENT — PULMONARY FUNCTION TESTS
PIF_VALUE: 20
PIF_VALUE: 18
PIF_VALUE: 21
PIF_VALUE: 20
PIF_VALUE: 18
PIF_VALUE: 19
PIF_VALUE: 18
PIF_VALUE: 18
PIF_VALUE: 19
PIF_VALUE: 18
PIF_VALUE: 19
PIF_VALUE: 20
PIF_VALUE: 21
PIF_VALUE: 20
PIF_VALUE: 18
PIF_VALUE: 18
PIF_VALUE: 17
PIF_VALUE: 19
PIF_VALUE: 18
PIF_VALUE: 19
PIF_VALUE: 18
PIF_VALUE: 19
PIF_VALUE: 19
PIF_VALUE: 21
PIF_VALUE: 19
PIF_VALUE: 19
PIF_VALUE: 18
PIF_VALUE: 19
PIF_VALUE: 18
PIF_VALUE: 17
PIF_VALUE: 20
PIF_VALUE: 18
PIF_VALUE: 17
PIF_VALUE: 18

## 2021-09-16 ASSESSMENT — PAIN SCALES - GENERAL
PAINLEVEL_OUTOF10: 0
PAINLEVEL_OUTOF10: 3
PAINLEVEL_OUTOF10: 0

## 2021-09-16 NOTE — PROGRESS NOTES
Clinical Pharmacy Progress Note    Vancomycin - Management by Pharmacy    Consult Date(s): 9/16/21  Consulting Provider(s): Dr. Weber Hippo / Plan    Indication: Pneumonia (CAP) - Vancomycin   Concurrent Antimicrobials: Zosyn, Remdesivir   Day of Vanc Therapy: 1   Current Dosing Method: Intermittent   Therapeutic Goal: 15 - 20 mcg/L   Current Dose / Frequency: 1 g IV x1 dose.  Plan / Rationale: Patient is on CRRT - intermittent dosing for now. Level this afternoon = 17.2 mcg/mL. Will re-dose with 1 g IV x1 dose and order a level for tomorrow AM.   Jerry Will continue to monitor clinical condition and make adjustments to regimen as appropriate. Thank you for consulting Pharmacy! Serg Acharya, PharmD  Main Pharmacy: [id number]  9/16/2021 5:38 PM      Interval update:     Subjective/Objective: Ms. Declan Napoles is a 73 y.o. female with a PMHx significant for CKD, admitted for Sepsis 2/2 to multifocal pneumonia/COVID-19, hypertensive emergency and CHRISTOPHER on CKD. Pharmacy has been consulted to dose vancomycin. Height:   Ht Readings from Last 1 Encounters:   09/15/21 5' 4\" (1.626 m)     Weight:   Wt Readings from Last 1 Encounters:   09/16/21 134 lb 14.7 oz (61.2 kg)       Level(s) / Doses:    Date Time Dose Level / Type of Level Interpretation   9/15 23:09 1500 mg IV x1     9/16 15:42 1000 mg IV x1 Random Therapeutic   Note: Serum levels collected for AUC-based dosing may be high if collected in close proximity to the dose administered. This is not necessarily an indicator of toxicity. Cultures & Sensitivities:    Date Site Micro Susceptibility / Result   9/16/21 Blood sent    9/15/21 MRSA, Nasal sent    9/15/21 Urine sent      Labs / Ancillary Data:    Estimated Creatinine Clearance: 8 mL/min (A) (based on SCr of 5.4 mg/dL [facility])).     Recent Labs     09/15/21  2038 09/15/21  2038 09/16/21  0150 09/16/21  0400 09/16/21  0958   CREATININE 7.8*   < > 7.4* 7.6* 5.4*   BUN 77*   < > 77* 76* 59*   WBC 18.1* --   --   --  29.6*    < > = values in this interval not displayed.

## 2021-09-16 NOTE — ED NOTES
Rapid CoVid swab collected and labeled with a lab sticker and walked immediately to lab.       Merced Adam RN  09/15/21 1046

## 2021-09-16 NOTE — PROGRESS NOTES
Pt admitted to the unit. G bath provided. Patient turned and repositioned. Patient continued on propofol and bicarb gtt.

## 2021-09-16 NOTE — PLAN OF CARE
Problem: Nutrition  Goal: Optimal nutrition therapy  9/16/2021 1154 by Bettina Oquendo RD, LD  Note: Nutrition Problem #1: Inadequate oral intake  Intervention: Food and/or Nutrient Delivery: Continue NPO  Nutritional Goals: Pt will tolerate most appropriate form of nutrition to meet >75% of nutrition needs while intubated.

## 2021-09-16 NOTE — CONSULTS
ICU HISTORY [address] [place] [address] Day: 1  ICU Day: 1                                                           Code:Full Code  Admit Date: 9/15/2021    PCP: Referring Not In System (Inactive)                                  CC: SOB and chills     HISTORY OF PRESENT ILLNESS:   Pérez Castillo is a 73 y.o. female with pmhx of CKD, current smoker, who presented with 3 days of SOB and increased work of breathing.     In the ED, patient tachypnic with accessory muscle use and hypoxic, SBP in 200s. Placed on Bipap per RT. Patient had lab work that was consistent with acute renal failure. Nephorology was consulted for CRRT. Patient was also severely acidotic and started on Bicarb gtt after one amp push. Broad spectrum abx were started in ED. Nitro gtt started.    Denied any sick contacts, not COVID vaccinated. Has not had any chest pain associated. No orthopnea, PND, swelling. Patient stated that she has not had issues with breathing in past. Does not regularly follow with any physicians. PAST HISTORY:   History reviewed. No pertinent past medical history. History reviewed. No pertinent surgical history. Alcohol: not currently   Illicit drugs: no use  Tobacco:  0.5 packs/day    Family History:  History reviewed. No pertinent family history. MEDICATIONS:     No current facility-administered medications on file prior to encounter.      Current Outpatient Medications on File Prior to Encounter   Medication Sig Dispense Refill    albuterol sulfate  (90 Base) MCG/ACT inhaler       atenolol (TENORMIN) 25 MG tablet Take 25 mg by mouth daily      ibuprofen (ADVIL;MOTRIN) 600 MG tablet TAKE 1 TABLET BY MOUTH THREE TIMES A DAY WITH FOOD           Scheduled Meds:   piperacillin-tazobactam  3,375 mg IntraVENous Q12H    dexamethasone  6 mg IntraVENous Daily    [START ON 9/17/2021] remdesivir IVPB  100 mg IntraVENous Q24H    norepinephrine        norepinephrine        insulin lispro  0-6 Units normal.      Mouth/Throat:      Mouth: Mucous membranes are moist.   Eyes:      Pupils: Pupils are equal, round, and reactive to light. Cardiovascular:      Rate and Rhythm: Normal rate and regular rhythm. Pulses: Normal pulses. Heart sounds: Normal heart sounds. Pulmonary:      Effort: Pulmonary effort is normal. She is intubated. Breath sounds: Rhonchi present. Abdominal:      General: There is no distension. Palpations: Abdomen is soft. Musculoskeletal:         General: No swelling. Skin:     General: Skin is warm and dry. Access:   -Central Access Day #: 1                                  -Peripheral Access Day#:1  -Arterial line Day#:1                                  NGT Day#: 1                                              Vent Settings: Vent Mode: AC/PRVC Rate Set: 24 bmp/Vt Ordered: 450 mL/ /FiO2 : 65 %    Recent Labs     09/16/21 0402 09/16/21 0906   PHART 7.241* 7.384   BOO1QZG 38.6 36.0   PO2ART 302.0* 142.9*           DATA:       Labs:  CBC:   Recent Labs     09/15/21  2038   WBC 18.1*   HGB 11.9*   HCT 36.8          BMP:   Recent Labs     09/15/21  2038 09/16/21  0150 09/16/21  0400   * 134* 137   K 4.5 4.1 4.2   CL 92* 94* 95*   CO2 8* 19* 16*   BUN 77* 77* 76*   CREATININE 7.8* 7.4* 7.6*   GLUCOSE 199* 227* 221*   PHOS  --   --  5.8*     LFT's:   Recent Labs     09/16/21  0150 09/16/21  0400   AST 39* 40*   ALT 19 20   BILITOT <0.2 <0.2   ALKPHOS 116 103     Troponin:   Recent Labs     09/15/21  2038   TROPONINI 0.14*     BNP:No results for input(s): BNP in the last 72 hours. ABGs:   Recent Labs     09/16/21 0402 09/16/21 0906   PHART 7.241* 7.384   FXQ1WOB 38.6 36.0   PO2ART 302.0* 142.9*     INR: No results for input(s): INR in the last 72 hours.     U/A:  Recent Labs     09/15/21  2117   COLORU Yellow   PHUR 6.0   WBCUA >100*   RBCUA 11-20*   MUCUS 3+*   BACTERIA 4+*   CLARITYU CLOUDY*   SPECGRAV 1.025   LEUKOCYTESUR SMALL*   UROBILINOGEN 0.2 BILIRUBINUR Negative   BLOODU LARGE*   GLUCOSEU Negative       XR CHEST PORTABLE   Final Result   Worsening bilateral pulmonary infiltrates. Placement of endotracheal    tube as above. XR CHEST PORTABLE   Final Result   1. Right IJ line projects over the mid SVC. No pneumothorax. 2.  Unchanged multifocal airspace disease. XR CHEST PORTABLE   Final Result    Patchy bilateral airspace opacities greatest in the left lung base suggestive of multifocal pneumonia. ASSESSMENT AND PLAN:   Fransisco Carvalho is a 73 y.o. female, who presented with 3 days SOB, hypoxic in ED with acute renal failure.     Acute hypoxic respiratory failure 2/2 multifocal pneumonia, COVID-19  On BIPAP in ED. Cultures pending. COVID rapid +  - sedated and intubated day 1   - sedation- propofol 30 mcg/kg/min and fentanyl- 12.5mcg/hr  - Vent Settings: Vent Mode: AC/PRVC Rate Set: 24 bmp/Vt Ordered: 450 mL/ /FiO2 : 65 %  - continue Vanc and Zosyn  - Rapid COVID test positive  - got decadron 6m in ED, will continue for 10 days  - will follow up ABG  - continue remdesivir for 4 days   - received dose of toci on 9/16     Hypertensive emergency  - s/p Nitroglycerin gtt  - on norepinephrine 5 mcg/min     Acute renal failure  History of CKD  - Nephrology folllowing  - on CRRT   - will follow renal panel closely  - daily weights, IOs     AG metabolic acidosis  Likely secondary to renal failure.  Got bicarb in ED  - monitor lactate Q6H  - CRRT      NSTEMI likely type 2  Likely 2/2 demand mismatch in setting of infection, renal failure  - will trend troponin, downtrended     Hyperglycemia on decardon  199 on RFP at admission, recent 221  - will monitor  - hypoglycemia protocol  - LDSSI       Code Status:Full Code  FEN: NPO   PPX: heparin   DISPO: ICU    This patient has been staffed and discussed with Dr Didi Ernandez.   -----------------------------  Sarita Carmichael MD, PGY-1  9/16/2021  9:16 AM    Patient seen, examined and discussed with the resident and I agree with the assessment and plan. Briefly, this is a 73 y.o. female with acute hypoxemic respiratory failure, pili on CKD, septic shock    Vent Mode: AC/PRVC Rate Set: 24 bmp/Vt Ordered: 450 mL/ /FiO2 : 65 %  PEEP 5  Recent Labs     09/16/21  0402 09/16/21  0906   PHART 7.241* 7.384   [id number] 38.6 36.0   PO2ART 302.0* 142.9*       COVID may be an \"also ran\" on this patient as her vent and xray are more consistent with conventional bacterial infection. Will treat as covid to try to prevent worsening, as well as for bacterial pneumonia. Her lungs don't appear as stiff as most COVID pneumonias, but it may be early in the course. And despite significant emphysema her flow volume loops don't show much obstruction. She was markedly acidemic on arrival which has been mitigated by bicarbonate infusions followed by CRRT. Continue Decadron, give toci, vancomycin and zosyn. Stop remdesivir    Critical care time spent reviewing labs/films, examining patient, collaborating with other physicians but excluding procedures for life threatening organ failure is 33 minutes.       Ramón Batista MD

## 2021-09-16 NOTE — PROGRESS NOTES
Zosyn 3.375mg Q12hr ordered for patient. This medication is renally eliminated. Will change to Zosyn 3.375mg Q8H per renal dose adjustment policy. Estimated Creatinine Clearance: 8 mL/min (A) (based on SCr of 5.4 mg/dL [facility])). PT is on CRRT    Pharmacy will continue to monitor renal function and adjust dose as necessary. Please call with any questions. Thanks!     Ritesh Dial, PharmD  PGY-1 Pharmacy Resident  [id number]  9/16/2021 3:20 PM

## 2021-09-16 NOTE — ED PROVIDER NOTES
ED Attending Attestation Note     Date of evaluation: 9/15/2021    This patient was seen by the resident. I have seen and examined the patient, agree with the workup, evaluation, management and diagnosis. The care plan has been discussed. I have reviewed the ECG and concur with the resident's interpretation. My assessment reveals a 70-year-old female presenting to the emerged part with a complaint of difficulty breathing and chills. On examination the patient is markedly tachypneic with increased work of breathing and accessory muscle use. Also markedly tachycardic. The patient arrived on CPAP by EMS. Transition to BiPAP per our respiratory therapy. Patient's labs indicative of severe acute renal failure old records were reviewed found to have a baseline creatinine between 1.5 and 2.5. Severe acidosis on VBG and completely compensated for with respiratory alkalosis. Patient was ordered for broad-spectrum antibiotics given concern for possible pneumonia. Covid test sent. Patient also notably significantly hypertensive. At this time considering that this could be an element of hypertensive emergency and will address the blood pressure with nitro drip. Patient ordered for a bicarb drip. Renal consulted ICU residents contacted for admission. Due to the immediate potential for life-threatening deterioration due to acute renal failure, acute hypoxic respiratory failure, I spent 45 minutes providing direct bedside critical care.   This time is excluding time spent supervising residents and time spent performing separately billed procedures       Janae Daniel MD  09/15/21 0539

## 2021-09-16 NOTE — PROCEDURES
Nikko Mcnulty is a 73 y.o. female patient. 1. Pneumonia of left lower lobe due to infectious organism    2. Suspected COVID-19 virus infection    3. Acute respiratory failure with hypoxia (HCC)    4. Acute renal failure, unspecified acute renal failure type ([facility] [address]    5. Metabolic acidosis      History reviewed. No pertinent past medical history. Blood pressure 84/67, pulse 98, temperature 97.4 °F (36.3 °C), temperature source Temporal, resp. rate 25, height 5' 4\" (1.626 m), weight 134 lb 14.7 oz (61.2 kg), SpO2 99 %. Central Line    Date/Time: 9/16/2021 6:15 AM  Performed by: Dea Lawson MD  Authorized by: Dea Lawson MD   Consent: The procedure was performed in an emergent situation. Site marked: the operative site was marked  Imaging studies: imaging studies available  Required items: required blood products, implants, devices, and special equipment available  Patient identity confirmed: verbally with patient and arm band  Time out: Immediately prior to procedure a \"time out\" was called to verify the correct patient, procedure, equipment, support staff and site/side marked as required.   Indications: vascular access  Anesthesia: local infiltration    Anesthesia:  Local Anesthetic: lidocaine 1% with epinephrine  Anesthetic total: 4 mL    Sedation:  Patient sedated: yes  Sedatives: propofol    Preparation: skin prepped with 2% chlorhexidine  Skin prep agent dried: skin prep agent completely dried prior to procedure  Sterile barriers: all five maximum sterile barriers used - cap, mask, sterile gown, sterile gloves, and large sterile sheet  Hand hygiene: hand hygiene performed prior to central venous catheter insertion  Location details: left femoral  Patient position: flat  Catheter size: 7 Fr  Pre-procedure: landmarks identified  Ultrasound guidance: yes  Sterile ultrasound techniques: sterile gel and sterile probe covers were used  Number of attempts: 1  Successful placement: yes  Post-procedure: line sutured and dressing applied  Assessment: blood return through all ports and free fluid flow  Patient tolerance: patient tolerated the procedure well with no immediate complications  Comments: EBL - 5 cc          Arian Palma MD  9/16/2021

## 2021-09-16 NOTE — CARE COORDINATION
Case Management Assessment           Initial Evaluation                Date / Time of Evaluation: 9/16/2021 2:43 PM                 Assessment Completed by: Edelmira Cisse RN     Patient is from home and was staying with her brother prior to her admission. She had no services or DME prior but per her son she probably needed it. She lives in an apt with about 4 steps to enter. She was not on O2 prior. She does not have POA paperwork, is not  and has three biological children. Mercy Cedillo is her oldest child. Patient Name: Wild Michelle     YOB: 1950  Diagnosis: Metabolic acidosis [I25.5]  Acute renal failure (ARF) ([address] [N17.9]  Acute respiratory failure with hypoxia ([address] [J96.01]  Acute renal failure, unspecified acute renal failure type ([address] [N17.9]  Pneumonia of left lower lobe due to infectious organism [J18.9]  Suspected COVID-19 virus infection [Z20.822]     Date / Time: 9/15/2021  8:00 PM    Patient Admission Status: Inpatient    If patient is discharged prior to next notation, then this note serves as note for discharge by case management.      Current PCP: Referring Not In System (Inactive)  Clinic Patient: No    Chart Reviewed: Yes  Patient/ Family Interviewed: Yes    Initial assessment completed at bedside with: patient's son by phone    Hospitalization in the last 30 days: No    Emergency Contacts:  Extended Emergency Contact Information  Primary Emergency Contact: [employer] Phone: [phone]  Mobile Phone: [phone]  Relation: Child    Advance Directives:   Code Status: Full Code    Healthcare Power of : No  Agent: NA  Contact Number: NA      Financial  Payor: Alex Rivera / Plan: [place] / Product Type: *No Product type* /     Pre-cert required for SNF: Yes    Pharmacy    [facility] [address] [address] [phone] Pedro Roberts [phone]   [address] [facility]  Phone: [phone] Fax: choice list was provided with basic dialogue that supports the patient's individualized plan of care/goals and shares the quality data associated with the providers.  Not Indicated    Care Transition patient: Mariela Tate RN  [facility]  Case Management Department  Ph: [phone]   Fax: [phone]

## 2021-09-16 NOTE — PROGRESS NOTES
Clinical Pharmacy Progress Note    Vancomycin - Management by Pharmacy    Consult Date(s): 9/16/21  Consulting Provider(s): Dr. Keyona Leung / Plan    Indication: Pneumonia (CAP) - Vancomycin   Concurrent Antimicrobials: Zosyn, Remdesivir   Day of Vanc Therapy: #1   Current Dosing Method: Intermittent   Therapeutic Goal: 15 - 20 mcg/L   Plan / Rationale:   o Given that patient is on CRRT, will dose based on levels at this time.  Will continue to monitor clinical condition and make adjustments to regimen as appropriate. Thank you for consulting Pharmacy! Chris Doss [facility]        Subjective/Objective: Ms. Jun Trujillo is a 73 y.o. female with a PMHx significant for CKD, admitted for Sepsis 2/2 to multifocal pneumonia/COVID-19, Hypertensive emergency and CHRISTOPHER on CKK . Pharmacy has been consulted to dose  Vancomycin. Height:   Ht Readings from Last 1 Encounters:   09/15/21 5' 4\" (1.626 m)     Weight:   Wt Readings from Last 1 Encounters:   09/16/21 134 lb 14.7 oz (61.2 kg)       Level(s) / Doses:    Date Time Dose Level / Type of Level Interpretation                 Note: Serum levels collected for AUC-based dosing may be high if collected in close proximity to the dose administered. This is not necessarily an indicator of toxicity. Cultures & Sensitivities:    Date Site Micro Susceptibility / Result   9/16/21 Bloodx2 sent    9/15/21 MRSA, Nasal sent    9/15/21 Urine sent      Labs / Ancillary Data:    Estimated Creatinine Clearance: 6 mL/min (A) (based on SCr of 7.4 mg/dL [facility])).     Recent Labs     09/15/21  2038 09/16/21  0150   CREATININE 7.8* 7.4*   BUN 77* 77*   WBC 18.1*  --

## 2021-09-16 NOTE — PROGRESS NOTES
CRRT started per orders. BF at 200, Pre at 1000, dialysate 500, post 200. New art line placed over guide wire. Art line not correlating to heart rate/rhythm on the monitor. ICU residents at bedside to assess pulse/art line rhythm. Heart rhythm seemed to improve once CRRT was started. Nitro gtt stopped at the time of CRRT start.

## 2021-09-16 NOTE — PROGRESS NOTES
Remdesivir Initiation Note    This patient meets criteria for initiation of remdesivir based on the following:  · Proven COVID-19  · Moderate disease (Requiring supplemental O2)  · Acceptable hepatic function (ALT within 5 times ULN)    Exclusion Criteria:   Severe disease requiring invasive or non-invasive mechanical ventilation (includes HFNC & BiPAP)   Could consider use in patients requiring high flow if early on in the disease course (based on symptom duration)   Use of more than 1 vasopressor prior to remdesivir initiation   Already improving on supportive treatment and/or impending discharge   Patients in whom the clinical team think death is in the immediate short-term where remdesivir is unlikely to change the clinical outcome     Ordered Remdesivir 200mg IV x1, following by 100mg IV q24h.  Will monitor renal panel and LFT's daily. Liver function tests will be monitored daily while on remdesivir. Thanks!   Vivian Bah Rph

## 2021-09-16 NOTE — PROGRESS NOTES
Occupational Therapy/Physical Therapy  Discharge    OT/PT orders received and chart reviewed. Pt currently intubated and on CRRT. Will sign off as pt is not appropriate for OT/PT at this time. Please re-consult post extubation as appropriate.      Hoda Olivares OTR/L  Devin Arango

## 2021-09-16 NOTE — ED NOTES
This was not started at 2039. It was started at 2145 after 2nd Blood culture was drawn. Was not able fix it on MAR. Pharmacy was made aware.       Maricarmen Hi RN  09/15/21 3336

## 2021-09-16 NOTE — PLAN OF CARE
Problem: Falls - Risk of:  Goal: Will remain free from falls  Description: Will remain free from falls  Outcome: Ongoing  Note: Pt's bed in lowest position, unable to use call light, pt is orally intubated and sedated on vent. Pt has been free of falls. Will continue to monitor. Problem: Non-Violent Restraints  Goal: Removal from restraints as soon as assessed to be safe  Outcome: Ongoing  Note: Pt does not met criteria for restraint removal. Will continue to monitor. Problem: Gas Exchange - Impaired  Goal: Absence of hypoxia  Outcome: Ongoing     Problem: Gas Exchange - Impaired  Goal: Promote optimal lung function  Outcome: Ongoing     Problem: Body Temperature -  Risk of, Imbalanced  Goal: Ability to maintain a body temperature within defined limits  Outcome: Ongoing  Note: Pt remains on karen hugger to maintain tempeture. Will continue to monitor. Problem: Isolation Precautions - Risk of Spread of Infection  Goal: Prevent transmission of infection  Outcome: Ongoing     Problem: Risk for Fluid Volume Deficit  Goal: Maintain normal heart rhythm  Outcome: Ongoing     Problem: Skin Integrity:  Goal: Will show no infection signs and symptoms  Description: Will show no infection signs and symptoms  Outcome: Ongoing  Note: Pt's skin is intact, being turned every 2 hours, sacral heart dressing in place, no redness noted underneath. Skin is warn and dry with no edema noted.  Will continue to monitor

## 2021-09-16 NOTE — CONSULTS
Noah Billet : [phone]     Fax :[phone]        Renal Consult Note    Patient:  Fransisco Carvalho  MRN: [id number]    CHIEF COMPLAINT:  SOB, chills    History Obtained From:  electronic medical record  PCP: Referring Not In System (Inactive)    HISTORY OF PRESENT ILLNESS:   Fransisco Carvalho is a 73 y.o. female with pmhx of CKD, HTN, PUD, pre-diabetes, current smoker, who presented with 3 days of SOB and increased work of breathing. In the ED, patient tachypnic with accessory muscle use and hypoxic, SBP in 200s. COVID +ve (not vaccinated). He was initially placed on Bipap  But resp failure worsened, requirng intubation. Patient had lab work that was consistent with acute renal failure severe acidosis Broad spectrum abx were and Nitro gtt was started. Nephorology was consulted for ARF management w/ CRRT. This AM pt was seen and examined at bedside. CRRT is running. She is sedated, intubated and mechanically ventilated. She is on 5 on Levo.  Urine output minimal.       Past Medical History:     OA (osteoarthritis)    Hypertension    Personal history of tobacco use, presenting hazards to health    Lower limb length difference    Current smoker    Renal and perinephric abscess    Acute renal failure (HCC)    Anemia, unspecified    Routine adult health maintenance    Peptic ulcer, unspecified site, unspecified as acute or chronic, without mention of hemorrhage, perforation, or obstruction    Right hip pain    DDD (degenerative disc disease), lumbosacral    External thrombosed hemorrhoids    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 Medications Prior to Admission:    Prior to Admission medications    Medication Sig Start Date End Date Taking? Authorizing Provider   albuterol sulfate  (90 Base) MCG/ACT inhaler  9/7/21   Historical Provider, MD   atenolol (TENORMIN) 25 MG tablet Take 25 mg by mouth daily    Historical Provider, MD   ibuprofen (ADVIL;MOTRIN) 600 MG tablet TAKE 1 TABLET BY MOUTH THREE TIMES A DAY WITH FOOD 8/2/21   Historical Provider, MD       Allergies:  Patient has no known allergies. Social History:   TOBACCO:   reports that she has been smoking cigarettes. She has been smoking about 0.50 packs per day. She has never used smokeless tobacco.  ETOH:   reports previous alcohol use. OCCUPATION:      Family History:   History reviewed. No pertinent family history. REVIEW OF SYSTEMS:  14 point ROS was done but was negative except mentioned above       Physical Exam:    Vitals: BP 81/68   Pulse 86   Temp 97.2 °F (36.2 °C) (Bladder)   Resp 26   Ht 5' 4\" (1.626 m)   Wt 134 lb 14.7 oz (61.2 kg)   SpO2 98%   BMI 23.16 kg/m²   Constitutional: sedated, intubated and mechanically ventilated  Skin:normal  HEENT:Pupils are reactive. Cardiovascular: S1, S2 without m/r/g  Respiratory: CTA B without w/r/r.  Diminished  Abdomen: +bs, soft, nt  Ext: No LE edema    CBC:   Recent Labs     09/15/21  2038   WBC 18.1*   HGB 11.9*        BMP:    Recent Labs     09/15/21  2038 09/16/21  0150 09/16/21  0400   * 134* 137   K 4.5 4.1 4.2   CL 92* 94* 95*   CO2 8* 19* 16*   BUN 77* 77* 76*   CREATININE 7.8* 7.4* 7.6*   GLUCOSE 199* 227* 221* Hepatic:   Recent Labs     09/16/21  0150 09/16/21  0400   AST 39* 40*   ALT 19 20   BILITOT <0.2 <0.2   ALKPHOS 116 103     Troponin:   Recent Labs     09/15/21  2038   TROPONINI 0.14*     BNP: No results for input(s): BNP in the last 72 hours. Lipids: No results for input(s): CHOL, HDL in the last 72 hours. Invalid input(s): LDLCALCU  ABGs:   Lab Results   Component Value Date    PHART 7.384 09/16/2021    PO2ART 142.9 09/16/2021    JSY7QHC 36.0 09/16/2021     INR: No results for input(s): INR in the last 72 hours. -----------------------------------------------------------------      Assessment and Plan   1. Acute Renal Failure   W/ ho CKD. Pt non complain    2. Acid/Base electrolyte abnormalities    3. Anemia    4. Covid-19 infection    Patient Active Problem List   Diagnosis Code    [facility] N17.9       Plan   - On CRRT  - Monitor labs per CRRT protocol  - >300 proteins in UA. Will check urine protein, urine creatinine  - Will check Kappa/Lambda ratio  - COVID -19 treatment per primary team      Recommend to dose adjust all medications  based on renal functions  Maintain SBP> 90 mmHg   Daily weights   AVOID NSAIDs  Avoid Nephrotoxins  Monitor Intake/Output  Call if significant decrease in urine output         Thank you for allowing us to participate in care of Mary Horan MD PGY-3     Pt discussed and staffed with      Dr. Noel Henry.   Feel free to contact me   [facility] [address]  Office : [phone]  Fax :[phone]      Pt seen on CRRT rodger well   Keep even   Monitor lytes   Wean pressors   Zoran Willingham MD

## 2021-09-16 NOTE — ED PROVIDER NOTES
[address] [facility] RESIDENT NOTE       Date of evaluation: 9/15/2021    Chief Complaint     Respiratory Distress (Pt brought in by squad from home with c/o Covid symtoms. Per squad stated they got a call for covid like symptoms. States that pt had RA sat in the low 80's. Upon arrival pt on C-pap. )      History of Present Illness     Maira Cramer is a 73 y.o. female with a PMHx of ?COPD, CKD, tobacco use, EtOH use  (all per chart) who presents to the Emergency Department c/o shortness of breath. The patient presents to the hospital via EMS with reports of shortness of breath and decreased p.o. along with chills. She called squad today. She lives alone. Upon their arrival, she was in respiratory distress satting 70%. They placed her on positive pressure. They transported her to the hospital.  Here, she is in significant respiratory distress and thus additional thorough history deferred at this time. She reports that she has been short of breath. She is not vaccinated for Covid. She lives alone. No sick contacts. Unknown fevers. Her son corroborates this later when I discussed with him, he states that she has not been eating and drinking very well over the last 2 days. Patient has not yet tried any other treatment for their symptoms and nothing else seems to make them better or worse. Aside from the above, patient denies any aggravating or alleviating factors or associated symptoms. Review of Systems     Positive for hypoxia, shortness of breath, respiratory stress no decreased p.o. pain chills. Negative for fever, abdominal pain, vomiting, diarrhea. All other systems reviewed are negative except as mentioned in HPI. Past Medical, Surgical, Family, and Social History     She has no past medical history on file. She has no past surgical history on file. Her family history is not on file. She reports that she has been smoking cigarettes.  She has been smoking about 0.50 packs per day. She has never used smokeless tobacco. She reports previous alcohol use. She reports previous drug use. Medications     Previous Medications    ALBUTEROL SULFATE  (90 BASE) MCG/ACT INHALER        ATENOLOL (TENORMIN) 25 MG TABLET    Take 25 mg by mouth daily    IBUPROFEN (ADVIL;MOTRIN) 600 MG TABLET    TAKE 1 TABLET BY MOUTH THREE TIMES A DAY WITH FOOD       Allergies     She has No Known Allergies. Physical Exam     INITIAL VITALS: BP: (!) 165/135, Temp: 97.8 °F (36.6 °C), Pulse: 145, Resp: (!) 42, SpO2: 100 %     General: Chronically ill-appearing female. Appears older than stated age. In significant respiratory distress on bilevel positive airway pressure    HEENT:  Normocephalic, atraumatic     Eyes: Anicteric, EOMI, PERRLA     Neck:  Supple, full ROM, no LAD    Pulmonary:   Diffusely rhonchorous in all lung fields. No audible wheezes. Cardiac: Tachycardic and regular. Abdomen:  Soft, nondistended, nontender. No masses. No guarding, no rebound     Extremities: Cool but dry with palpable pulses    Skin: No rashes or bruises    Neuro:   Awake, alert, moving UE and LE spontaneously    Psych:   Mood and affect appropriate, answering questions appropriately in one word answers      Diagnostic Results     EKG   Interpreted in conjunction with emergency department physician Jose A Munoz MD  Indication: Shortness of breath, hypoxia  Rate: 145 bpm,   Rhythm: Sinus tach with possible PACs.,   Intervals:  ms, QRS 84 ms,  ms,   Axis: Normal,    No ST segment or T wave changes concerning for myocardial ischemia. No previous EKG available in our system for comparison  RADIOLOGY:  XR CHEST PORTABLE   Final Result    Patchy bilateral airspace opacities greatest in the left lung base suggestive of multifocal pneumonia.       XR CHEST PORTABLE    (Results Pending)       LABS:   Results for orders placed or performed during the hospital encounter of 09/15/21   CBC Auto Differential   Result Value Ref Range    WBC 18.1 (H) 4.0 - 11.0 K/uL    RBC 3.81 (L) 4.00 - 5.20 M/uL    Hemoglobin 11.9 (L) 12.0 - 16.0 g/dL    Hematocrit 36.8 36.0 - 48.0 %    MCV 96.5 80.0 - 100.0 fL    MCH 31.3 26.0 - 34.0 pg    MCHC 32.5 31.0 - 36.0 g/dL    RDW 16.4 (H) 12.4 - 15.4 %    Platelets 367 161 - 672 K/uL    MPV 8.5 5.0 - 10.5 fL    Neutrophils % 93.5 %    Lymphocytes % 1.3 %    Monocytes % 5.0 %    Eosinophils % 0.0 %    Basophils % 0.2 %    Neutrophils Absolute 16.9 (H) 1.7 - 7.7 K/uL    Lymphocytes Absolute 0.2 (L) 1.0 - 5.1 K/uL    Monocytes Absolute 0.9 0.0 - 1.3 K/uL    Eosinophils Absolute 0.0 0.0 - 0.6 K/uL    Basophils Absolute 0.0 0.0 - 0.2 K/uL   Basic Metabolic Panel w/ Reflex to MG   Result Value Ref Range    Sodium 126 (L) 136 - 145 mmol/L    Potassium reflex Magnesium 4.5 3.5 - 5.1 mmol/L    Chloride 92 (L) 99 - 110 mmol/L    CO2 8 (LL) 21 - 32 mmol/L    Anion Gap 26 (H) 3 - 16    Glucose 199 (H) 70 - 99 mg/dL    BUN 77 (H) 7 - 20 mg/dL    CREATININE 7.8 (HH) 0.6 - 1.2 mg/dL    GFR Non- 5 (A) >60    GFR  6 (A) >60    Calcium 8.7 8.3 - 10.6 mg/dL   Lipase   Result Value Ref Range    Lipase 214.0 (H) 13.0 - 60.0 U/L   Troponin   Result Value Ref Range    Troponin 0.14 (H) <0.01 ng/mL   Brain Natriuretic Peptide   Result Value Ref Range    Pro-BNP 14,046 (H) 0 - 124 pg/mL   Urinalysis, reflex to microscopic   Result Value Ref Range    Color, UA Yellow Straw/Yellow    Clarity, UA CLOUDY (A) Clear    Glucose, Ur Negative Negative mg/dL    Bilirubin Urine Negative Negative    Ketones, Urine Negative Negative mg/dL    Specific Gravity, UA 1.025 1.005 - 1.030    Blood, Urine LARGE (A) Negative    pH, UA 6.0 5.0 - 8.0    Protein, UA >=300 (A) Negative mg/dL    Urobilinogen, Urine 0.2 <2.0 E.U./dL    Nitrite, Urine Negative Negative    Leukocyte Esterase, Urine SMALL (A) Negative    Microscopic Examination YES     Urine Type NotGiven Lactate, Sepsis   Result Value Ref Range    Lactic Acid, Sepsis 3.2 (H) 0.4 - 1.9 mmol/L   Blood Gas, Venous   Result Value Ref Range    pH, Herminio 7.136 (LL) 7.350 - 7.450    pCO2, Herminio 35.0 (L) 41.0 - 51.0 mmHg    pO2, Herminio 36.9 25 - 40 mmHg    HCO3, Venous 11.8 (L) 24.0 - 28.0 mmol/L    Base Excess, Herminio -16.3 (L) -2.0 - 3.0 mmol/L    O2 Sat, Herminio 54 Not established %    Carboxyhemoglobin 1.1 0.0 - 1.5 %    MetHgb, Herminio 0.1 0.0 - 1.5 %    TC02 (Calc), Herminio 13 mmol/L   Microscopic Urinalysis   Result Value Ref Range    Mucus, UA 3+ (A) None Seen /LPF    WBC, UA >100 (A) 0 - 5 /HPF    RBC, UA 11-20 (A) 0 - 4 /HPF    Epithelial Cells, UA 2-5 0 - 5 /HPF    Bacteria, UA 4+ (A) None Seen /HPF            RECENT VITALS:  BP: (!) 180/93, Temp: 97.8 °F (36.6 °C), Pulse: 142, Resp: (!) 46, SpO2: 94 %     Procedures     Central Line    Date/Time: 9/15/2021 10:56 PM  Performed by: Kimber Saab MD  Authorized by: Jason Kan MD     Consent:     Consent obtained:  Verbal    Consent given by:  Patient    Risks discussed:  Arterial puncture, pneumothorax, incorrect placement, infection and bleeding    Alternatives discussed:  No treatment  Pre-procedure details:     Hand hygiene: Hand hygiene performed prior to insertion      Sterile barrier technique:  All elements of maximal sterile technique followed      Skin preparation:  2% chlorhexidine    Skin preparation agent: Skin preparation agent completely dried prior to procedure    Procedure details:     Location:  R internal jugular    Site selection rationale:  Ideal for iHD vs CRRT    Patient position:  Flat    Procedural supplies:  Triple lumen    Catheter size:  9 Fr    Landmarks identified: yes      Ultrasound guidance: yes      Sterile ultrasound techniques: Sterile gel and sterile probe covers were used      Number of attempts:  2    Successful placement: yes    Post-procedure details:     Post-procedure:  Dressing applied and line sutured    Assessment:  Blood return through all ports, no pneumothorax on x-ray and placement verified by x-ray    Patient tolerance of procedure: Tolerated with difficulty        ED Course     Nursing Notes, Past Medical Hx, Past Surgical Hx, Social Hx, Allergies, and Family Hx were reviewed. The patient was given the following medications:  Orders Placed This Encounter   Medications    0.9 % sodium chloride bolus    dexamethasone (DECADRON) injection 6 mg    ipratropium-albuterol (DUONEB) nebulizer solution 1 ampule     Order Specific Question:   Initiate RT Bronchodilator Protocol     Answer: Yes    albuterol (PROVENTIL) nebulizer solution 2.5 mg     Order Specific Question:   Initiate RT Bronchodilator Protocol     Answer:    Yes    cefTRIAXone (ROCEPHIN) 2000 mg IVPB in D5W 50ml minibag     Order Specific Question:   Antimicrobial Indications     Answer:   Pneumonia (CAP)    vancomycin (VANCOCIN) 1,500 mg in dextrose 5 % 250 mL IVPB     Order Specific Question:   Antimicrobial Indications     Answer:   Pneumonia (CAP)    azithromycin (ZITHROMAX) 500 mg in dextrose 5 % 250 mL IVPB     Order Specific Question:   Antimicrobial Indications     Answer:   Pneumonia (CAP)    sodium bicarbonate 8.4 % injection 50 mEq    DISCONTD: sodium bicarbonate 150 mEq in dextrose 5 % 1,000 mL infusion    nitroGLYCERIN 50 mg in dextrose 5% 250 mL infusion    sodium bicarbonate 8.4 % injection     Lyla Crockett: cabinet override    sodium bicarbonate 75 mEq in sodium chloride 0.45 % 1,000 mL infusion    sodium chloride flush 0.9 % injection 5-40 mL    sodium chloride flush 0.9 % injection 5-40 mL    0.9 % sodium chloride infusion    heparin (porcine) injection 5,000 Units    OR Linked Order Group     ondansetron (ZOFRAN-ODT) disintegrating tablet 4 mg     ondansetron (ZOFRAN) injection 4 mg    polyethylene glycol (GLYCOLAX) packet 17 g    OR Linked Order Group     acetaminophen (TYLENOL) tablet 650 mg     acetaminophen (TYLENOL) suppository 650 mg       CONSULTS:  Yovani Bond 761 TO CRITICAL CARE  IP CONSULT TO HOSPITALIST  [place] [id number] Daiana Modi / ASSESSMENT / Ora Smitha is a 73 y.o. female with a history and presentation as described above in HPI. The patient was evaluated by myself and the ED Attending Physician, Dr. Bill Lopez MD. All management and disposition plans were discussed and agreed upon. Appropriate labs and diagnostic studies were reviewed as they were made available. Pertinent laboratory studies in medical decision making are listed below. Upon presentation, the patient was evaluated by me. She presents and was roomed in the trauma bay. We continued her on BiPAP. IV access was established. Her x-ray did not show significant pulmonary edema, did show pneumonia in the left lower lobe. Suspicious for coronavirus. She was empirically given antibiotics. Vancomycin, ceftriaxone, and azithromycin. She was also given breathing treatments. Her initial VBG reveals a significant metabolic acidosis with incomplete compensation. Her bicarb on her renal panel is 8. She has acute renal failure. Dialysis line was placed, please see my procedure note. I discussed this with her son. In hopes of avoiding intubation for her, we gave her an amp of bicarb, I discussed with the renal physician. We will also give her a bicarb drip. Currently, it appears that she has a urine infection. I discussed with the ICU residents as well as the hospitalist regarding mission to the ICU. We will trend her VBG. We also showed a nitro drip in the effort to offload in the event that there is an element of hypertensive emergency ongoing. I kept her son updated throughout. His name is Iwona Borrero.   His phone number is [phone] and he would like to be kept updated by the team.    At this time the patient has been admitted to ICU for further evaluation and management of hypoxia, respiratory failure, metabolic acidosis, suspected Covid. The patient will continue to be monitored here in the emergency department until which time she is moved to her new treatment location. Clinical Impression     1. Pneumonia of left lower lobe due to infectious organism    2. Suspected COVID-19 virus infection    3. Acute respiratory failure with hypoxia (HCC)    4. Acute renal failure, unspecified acute renal failure type ([facility]    5. Metabolic acidosis        Disposition     PATIENT REFERRED TO:  No follow-up provider specified.     DISCHARGE MEDICATIONS:  New Prescriptions    No medications on file       DISPOSITION Admitted 09/15/2021 10:21:43 PM          Burton Menjivar MD  Resident  09/15/21 4310

## 2021-09-16 NOTE — PROGRESS NOTES
Pt's son called via phone and was provided an update on pt's condition and plan of care. Questions answered. Will continue to monitor.

## 2021-09-16 NOTE — ACP (ADVANCE CARE PLANNING)
Advance Care Planning     Advance Care Planning Inpatient Note  Spiritual Care Department    Today's Date: 9/16/2021  Unit: [facility] ICU    Received request from family. Upon review of chart and communication with care team, Spiritual Care will defer advance care planning with patient at this time. Yissel Costello By phone       Health Care Decision Makers:       Primary Decision Maker: Estella Adame Child - [phone]    Summary:  Documented Next of Kin, per patient report    Advance Care Planning Documents (Patient Wishes):  None     Assessment:  PT is unable to make any decisions at this time. After speaking to Mr. Diane Crystal, he confirmed the [facility] order.      Interventions:  Discussed and provided education on state decision maker hierarchy      Electronically signed by Jamie Brown on 9/16/2021 at 1:30 PM

## 2021-09-16 NOTE — H&P
ICU HISTORY AND [address] Day: 0   ICU Day:  0                                                         Code:Full Code  Admit Date: 9/15/2021  PCP: Referring Not In System (Inactive)                                  CC: SOB, chills    HISTORY OF PRESENT ILLNESS:   Rita Amezcua is a 73 y.o. female with pmhx of CKD, current smoker, who presented with 3 days of SOB and increased work of breathing. In the ED, patient tachypnic with accessory muscle use and hypoxic, SBP in 200s. Placed on Bipap per RT. Patient had lab work that was consistent with acute renal failure. Nephorology was consulted for CRRT. Patient was also severely acidotic and started on Bicarb gtt after one amp push. Broad spectrum abx were started in ED. Nitro gtt started. During my interaction with patient, breathing is stable. Feels more comfortable on PAP but is still with conversationally dyspnea and accessory muscle use during conversation. Denies any sick contacts, not COVID vaccinated. Has not had any chest pain associated. No orthopnea, PND, swelling. Patient states that she has not had issues with breathing in past. Does not regularly follow with any physicians.    - NVD, Headache, vision changes, CP, fever/chills + SOB    PAST HISTORY:   History reviewed. No pertinent past medical history. History reviewed. No pertinent surgical history. Social History     Tobacco History     Smoking Status  Current Every Day Smoker Smoking Frequency  0.5 packs/day Smoking Tobacco Type  Cigarettes    Smokeless Tobacco Use  Never Used          Alcohol History     Alcohol Use Status  Not Currently          Drug Use     Drug Use Status  Not Currently          Sexual Activity     Sexually Active  Not Asked               Family History:  History reviewed. No pertinent family history. MEDICATIONS:     No current facility-administered medications on file prior to encounter.      Current Outpatient Medications on File Prior to Encounter Medication Sig Dispense Refill    albuterol sulfate  (90 Base) MCG/ACT inhaler       atenolol (TENORMIN) 25 MG tablet Take 25 mg by mouth daily      ibuprofen (ADVIL;MOTRIN) 600 MG tablet TAKE 1 TABLET BY MOUTH THREE TIMES A DAY WITH FOOD           Scheduled Meds:   vancomycin  1,500 mg IntraVENous Once    sodium chloride flush  5-40 mL IntraVENous 2 times per day    heparin (porcine)  5,000 Units SubCUTAneous 3 times per day      Continuous Infusions:   nitroGLYCERIN 10 mcg/min (09/15/21 2235)    IV infusion builder 150 mL/hr at 09/15/21 2222    sodium chloride       PRN Meds:sodium chloride flush, sodium chloride, ondansetron **OR** ondansetron, polyethylene glycol, acetaminophen **OR** acetaminophen    Allergies: No Known Allergies    REVIEW OF SYSTEMS:       History obtained from the patient and chart review    Review of Systems   14 point ROS completed and negative except for listed in HPI    PHYSICAL EXAM:       Vitals: BP (!) 180/93   Pulse 142   Temp 97.8 °F (36.6 °C)   Resp (!) 46   Ht 5' 4\" (1.626 m)   Wt 140 lb (63.5 kg)   SpO2 94%   BMI 24.03 kg/m²     I/O:  No intake or output data in the 24 hours ending 09/15/21 2305  No intake/output data recorded. No intake/output data recorded. Physical Examination:     Physical Exam  Constitutional:       Appearance: She is ill-appearing. HENT:      Head: Normocephalic. Nose: Nose normal.      Mouth/Throat:      Mouth: Mucous membranes are moist.   Eyes:      Extraocular Movements: Extraocular movements intact. Pupils: Pupils are equal, round, and reactive to light. Cardiovascular:      Rate and Rhythm: Regular rhythm. Tachycardia present. Pulses: Normal pulses. Heart sounds: Normal heart sounds. Pulmonary:      Effort: Respiratory distress present. Comments: Increased WOB, ronchi diffusely  Abdominal:      General: Abdomen is flat. Palpations: Abdomen is soft. Tenderness:  There is no abdominal to 48 hours ago  - toci if indicated based on CRP    Hypertensive emergency  - Nitroglycerin gtt    Acute renal failure  History of CKD  - Nephrology aware   - CRRT orders placed  - will follow renal panel closely  - daily weights, IOs    AG metabolic acidosis  Likely secondary to renal failure. Got bicarb in ED  - monitor lactate Q6H  - CRRT     NSTEMI likely type 2  Likely 2/2 demand mismatch in setting of infection, renal failure  - will trend troponin    Hyperglycemia on decardon  199 on RFP at admission  - will monitor  - hypoglycemia protocol  - LDSSI    Code Status:Full Code  FEN: NPO  PPX: Heparin  DISPO: ICU    This patient has been staffed and discussed with Dr. Trip Wong.    -----------------------------  Justin Caceres MD, PGY-1  9/15/2021  11:05 PM

## 2021-09-16 NOTE — PROGRESS NOTES
Called to bedside by the nurse. The patient appeared to be more tachypneic with very large tidal volumes, I am concerned for worsening acidosis. VBG was sent but did not return in a timely fashion. The decision was made with the ICU and the ED teams at bedside to intubate the patient. The patient was verbally consented. Please see my procedure note below. Chest x-ray afterward appear to have the tube in appropriate position. An A-line was then placed. To be brought up to the ICU. Later, her VBG did return with continued improvement in her pH though her CO2 was rising, I suspect that she is tiring out. We will control her ventilations on the tube. An ABG was sent and is pending at this time for vent management. Endotracheal Intubation Procedure Note  Indication for endotracheal intubation: respiratory failure  Sedation: etomidate  Paralytic: succinylcholine  Lidocaine: no  Atropine: no  Equipment: Arpit 3 laryngoscope blade. and 7.0mm cuffed endotracheal tube. Cricoid Pressure: no  Number of attempts: 1  ETT location confirmed by by auscultation, by CXR and ETCO2 monitor. Brendon Vazquez MD         Patient Name: Rita Amezcua   Medical Record Number: [id number]  Date: 9/16/2021   Time: 2:31 AM   Room/Bed: 1TR/1TR-01  Arterial Line Placement Procedure Note                   Indication: Need for serial blood work, metabolic derangement and arterial blood gases    Consent: Unable to be obtained due to the emergent nature of this procedure. Ike's Test: Was not performed    Procedure: The skin over the right radial artery was prepped with chlorhexidine. Local anesthesia was not performed due to the emergent nature of this procedure. An 18 gauge angiocath was then inserted, using a modified Seldinger technique, into the vessel. The transducer set was then attached and securely fastened to the skin with sutures. Waveforms on the monitor were observed and found to be adequate.   The patient had good distal perfusion after the procedure. The site was then dressed in a sterile fashion. The patient tolerated the procedure well.      Complications: None    Electronically Signed by: Brendon Vazquez MD

## 2021-09-16 NOTE — CONSULTS
Consult received  Full note to follow    Eliana Nieto MD  9/15/2021    [facility] [address]  Office : [phone]  Fax :[phone]

## 2021-09-16 NOTE — CONSULTS
Comprehensive Nutrition Assessment    RD did not conduct direct, in-person nutrition evaluation in efforts to reduce exposure and use of PPE for high risk persons, PUI persons, patients who have tested positive for Covid-19 or those awaiting respiratory panel results. EMR was screened for nutrition risk factors, as defined per nutrition standards of care. RECOMMENDATIONS:  Per ASPEN guidelines, suggest start of alternative nutrition within 24-48 hours intubation, as appropriate. 1. PO Diet: Continue NPO while intubated. 2. Nutrition Support: Recs below as needed  · Recommend EN formula Peptide-Based  Vital AF 1.2 @ goal rate 55 ml/hr to provide 1320 ml total volume, 1584 kcal, 99 g protein and 1070 ml free water. · Initiate EN @ 20 mL/hr and as tolerated, increase by 25 mL/hr q 4 hours until goal of 55 mL/hr is met. · Recommend water bolus 75 ml every 4 hours. NUTRITION ASSESSMENT:   Type and Reason for Visit:  Type and Reason for Visit: Initial   Nutritional summary & status: Pt assessed for new mechanical vent. Intubated/sedated yesterday; currently on fentanyl, propofol @ 11.4 ml/hr (+301 kcal/d) and levo @ 5mcg/min. Noted per MD nephrology consulted for CRRT d/t lab work consistent with acute renal failure. No recent wt hx per chart review to assess wt status. Recommend start of EN if pt remains intubated and on low pressor support.       Patient admitted d/t SOB, chills - covid-19    PMH significant for: CKD, current smoker    MALNUTRITION ASSESSMENT  Context of Malnutrition: Acute Illness   Malnutrition Status: Insufficient data  Findings of the 6 clinical characteristics of malnutrition (Minimum of 2 out of 6 clinical characteristics is required to make the diagnosis of moderate or severe Protein Calorie Malnutrition based on AND/ASPEN Guidelines):  Energy Intake: Less than/equal to 50% of estimated energy requirements    Energy Intake Time: 2 days intubated/NPO   Weight Loss %: Unable to assess [phone]

## 2021-09-16 NOTE — FLOWSHEET NOTE
09/16/21 1251   Encounter Summary   Services provided to: Family   Referral/Consult From: Family;Palliative Care   Continue Visiting   (9/16, jacqueline )   Complexity of Encounter High   Length of Encounter 15 minutes;1 hour   Advance Care Planning Yes   Routine   Type Initial   Assessment Tearful; Anxious   Intervention Active listening;Explored feelings, thoughts, concerns; Empowerment   Outcome Comfort;Expressed gratitude;Engaged in conversation;Expressed feelings/needs/concerns; Tearful;Less anxious, less agitated;Receptive;Venting emotion; De-escalated   Primary Decision Maker (Healthcare Proxy)   [facility] is: Legal Next of Kin   PT's son who is listed has the primary decision maker wanted to drop off some belongings for the patient. He then expressed his desire to visit her. However, I then explained the visitation restrictions and in particular, for COVID-19. I then encouraged him to call PT's RN for any details or updates.    Staff Latia Torres MA

## 2021-09-17 LAB
A/G RATIO: 0.5 (ref 1.1–2.2)
A/G RATIO: 0.6 (ref 1.1–2.2)
ALBUMIN SERPL-MCNC: 2.3 G/DL (ref 3.4–5)
ALBUMIN SERPL-MCNC: 2.4 G/DL (ref 3.4–5)
ALP BLD-CCNC: 70 U/L (ref 40–129)
ALP BLD-CCNC: 91 U/L (ref 40–129)
ALT SERPL-CCNC: 15 U/L (ref 10–40)
ALT SERPL-CCNC: 18 U/L (ref 10–40)
ANION GAP SERPL CALCULATED.3IONS-SCNC: 11 MMOL/L (ref 3–16)
ANION GAP SERPL CALCULATED.3IONS-SCNC: 14 MMOL/L (ref 3–16)
ANION GAP SERPL CALCULATED.3IONS-SCNC: 15 MMOL/L (ref 3–16)
AST SERPL-CCNC: 28 U/L (ref 15–37)
AST SERPL-CCNC: 35 U/L (ref 15–37)
BASE EXCESS ARTERIAL: 7 (ref -3–3)
BASE EXCESS ARTERIAL: 7 (ref -3–3)
BASOPHILS ABSOLUTE: 0 K/UL (ref 0–0.2)
BASOPHILS RELATIVE PERCENT: 0.1 %
BILIRUB SERPL-MCNC: <0.2 MG/DL (ref 0–1)
BILIRUB SERPL-MCNC: <0.2 MG/DL (ref 0–1)
BUN BLDV-MCNC: 30 MG/DL (ref 7–20)
BUN BLDV-MCNC: 36 MG/DL (ref 7–20)
BUN BLDV-MCNC: 39 MG/DL (ref 7–20)
CALCIUM IONIZED: 0.89 MMOL/L (ref 1.12–1.32)
CALCIUM IONIZED: 0.91 MMOL/L (ref 1.12–1.32)
CALCIUM IONIZED: 0.96 MMOL/L (ref 1.12–1.32)
CALCIUM IONIZED: 1 MMOL/L (ref 1.12–1.32)
CALCIUM SERPL-MCNC: 7.7 MG/DL (ref 8.3–10.6)
CALCIUM SERPL-MCNC: 7.9 MG/DL (ref 8.3–10.6)
CALCIUM SERPL-MCNC: 8 MG/DL (ref 8.3–10.6)
CHLORIDE BLD-SCNC: 88 MMOL/L (ref 99–110)
CHLORIDE BLD-SCNC: 89 MMOL/L (ref 99–110)
CHLORIDE BLD-SCNC: 92 MMOL/L (ref 99–110)
CO2: 29 MMOL/L (ref 21–32)
CO2: 30 MMOL/L (ref 21–32)
CO2: 30 MMOL/L (ref 21–32)
CREAT SERPL-MCNC: 2.8 MG/DL (ref 0.6–1.2)
CREAT SERPL-MCNC: 2.8 MG/DL (ref 0.6–1.2)
CREAT SERPL-MCNC: 3.3 MG/DL (ref 0.6–1.2)
D DIMER: 1223 NG/ML DDU (ref 0–229)
EOSINOPHILS ABSOLUTE: 0 K/UL (ref 0–0.6)
EOSINOPHILS RELATIVE PERCENT: 0 %
ESTIMATED AVERAGE GLUCOSE: 142.7 MG/DL
GFR AFRICAN AMERICAN: 17
GFR AFRICAN AMERICAN: 20
GFR AFRICAN AMERICAN: 20
GFR NON-AFRICAN AMERICAN: 14
GFR NON-AFRICAN AMERICAN: 17
GFR NON-AFRICAN AMERICAN: 17
GLOBULIN: 3.6 G/DL
GLOBULIN: 4.5 G/DL
GLUCOSE BLD-MCNC: 168 MG/DL (ref 70–99)
GLUCOSE BLD-MCNC: 173 MG/DL (ref 70–99)
GLUCOSE BLD-MCNC: 173 MG/DL (ref 70–99)
GLUCOSE BLD-MCNC: 176 MG/DL (ref 70–99)
GLUCOSE BLD-MCNC: 176 MG/DL (ref 70–99)
GLUCOSE BLD-MCNC: 177 MG/DL (ref 70–99)
GLUCOSE BLD-MCNC: 185 MG/DL (ref 70–99)
HBA1C MFR BLD: 6.6 %
HCO3 ARTERIAL: 31.3 MMOL/L (ref 21–29)
HCO3 ARTERIAL: 31.4 MMOL/L (ref 21–29)
HCT VFR BLD CALC: 26.9 % (ref 36–48)
HEMOGLOBIN: 9.3 G/DL (ref 12–16)
KAPPA, FREE LIGHT CHAINS, SERUM: 262.61 MG/L (ref 3.3–19.4)
KAPPA/LAMBDA RATIO: 2.15 (ref 0.26–1.65)
KAPPA/LAMBDA TEST COMMENT: ABNORMAL
LAMBDA, FREE LIGHT CHAINS, SERUM: 122.02 MG/L (ref 5.71–26.3)
LYMPHOCYTES ABSOLUTE: 0.4 K/UL (ref 1–5.1)
LYMPHOCYTES RELATIVE PERCENT: 1.3 %
MAGNESIUM: 2.3 MG/DL (ref 1.8–2.4)
MCH RBC QN AUTO: 31.3 PG (ref 26–34)
MCHC RBC AUTO-ENTMCNC: 34.5 G/DL (ref 31–36)
MCV RBC AUTO: 90.8 FL (ref 80–100)
MONOCYTES ABSOLUTE: 0.8 K/UL (ref 0–1.3)
MONOCYTES RELATIVE PERCENT: 2.5 %
MRSA SCREEN RT-PCR: NORMAL
NEUTROPHILS ABSOLUTE: 29.1 K/UL (ref 1.7–7.7)
NEUTROPHILS RELATIVE PERCENT: 96.1 %
O2 SAT, ARTERIAL: 94 % (ref 93–100)
O2 SAT, ARTERIAL: 94 % (ref 93–100)
ORGANISM: ABNORMAL
PCO2 ARTERIAL: 45.5 MM HG (ref 35–45)
PCO2 ARTERIAL: 50.6 MM HG (ref 35–45)
PDW BLD-RTO: 15.6 % (ref 12.4–15.4)
PERFORMED ON: ABNORMAL
PH ARTERIAL: 7.4 (ref 7.35–7.45)
PH ARTERIAL: 7.45 (ref 7.35–7.45)
PH VENOUS: 7.48 (ref 7.35–7.45)
PH VENOUS: 7.48 (ref 7.35–7.45)
PHOSPHORUS: 3.7 MG/DL (ref 2.5–4.9)
PHOSPHORUS: 4.5 MG/DL (ref 2.5–4.9)
PLATELET # BLD: 203 K/UL (ref 135–450)
PMV BLD AUTO: 8.2 FL (ref 5–10.5)
PO2 ARTERIAL: 69.9 MM HG (ref 75–108)
PO2 ARTERIAL: 72.7 MM HG (ref 75–108)
POC SAMPLE TYPE: ABNORMAL
POTASSIUM REFLEX MAGNESIUM: 4.4 MMOL/L (ref 3.5–5.1)
POTASSIUM SERPL-SCNC: 4.1 MMOL/L (ref 3.5–5.1)
POTASSIUM SERPL-SCNC: 4.1 MMOL/L (ref 3.5–5.1)
RBC # BLD: 2.96 M/UL (ref 4–5.2)
SODIUM BLD-SCNC: 132 MMOL/L (ref 136–145)
SODIUM BLD-SCNC: 133 MMOL/L (ref 136–145)
SODIUM BLD-SCNC: 133 MMOL/L (ref 136–145)
TCO2 ARTERIAL: 33 MMOL/L
TCO2 ARTERIAL: 33 MMOL/L
TOTAL PROTEIN: 5.9 G/DL (ref 6.4–8.2)
TOTAL PROTEIN: 6.9 G/DL (ref 6.4–8.2)
URINE CULTURE, ROUTINE: ABNORMAL
VANCOMYCIN RANDOM: 24.1 UG/ML
WBC # BLD: 30.3 K/UL (ref 4–11)

## 2021-09-17 PROCEDURE — 2580000003 HC RX 258: Performed by: HOSPITALIST

## 2021-09-17 PROCEDURE — 94761 N-INVAS EAR/PLS OXIMETRY MLT: CPT

## 2021-09-17 PROCEDURE — 85379 FIBRIN DEGRADATION QUANT: CPT

## 2021-09-17 PROCEDURE — 83735 ASSAY OF MAGNESIUM: CPT

## 2021-09-17 PROCEDURE — 82330 ASSAY OF CALCIUM: CPT

## 2021-09-17 PROCEDURE — 6360000002 HC RX W HCPCS: Performed by: HOSPITALIST

## 2021-09-17 PROCEDURE — 2580000003 HC RX 258: Performed by: INTERNAL MEDICINE

## 2021-09-17 PROCEDURE — 80053 COMPREHEN METABOLIC PANEL: CPT

## 2021-09-17 PROCEDURE — 82947 ASSAY GLUCOSE BLOOD QUANT: CPT

## 2021-09-17 PROCEDURE — 6360000002 HC RX W HCPCS: Performed by: STUDENT IN AN ORGANIZED HEALTH CARE EDUCATION/TRAINING PROGRAM

## 2021-09-17 PROCEDURE — 90947 DIALYSIS REPEATED EVAL: CPT | Performed by: INTERNAL MEDICINE

## 2021-09-17 PROCEDURE — 2500000003 HC RX 250 WO HCPCS

## 2021-09-17 PROCEDURE — 37799 UNLISTED PX VASCULAR SURGERY: CPT

## 2021-09-17 PROCEDURE — 80202 ASSAY OF VANCOMYCIN: CPT

## 2021-09-17 PROCEDURE — 82803 BLOOD GASES ANY COMBINATION: CPT

## 2021-09-17 PROCEDURE — 2580000003 HC RX 258: Performed by: SURGERY

## 2021-09-17 PROCEDURE — 87205 SMEAR GRAM STAIN: CPT

## 2021-09-17 PROCEDURE — 2500000003 HC RX 250 WO HCPCS: Performed by: STUDENT IN AN ORGANIZED HEALTH CARE EDUCATION/TRAINING PROGRAM

## 2021-09-17 PROCEDURE — 2580000003 HC RX 258: Performed by: STUDENT IN AN ORGANIZED HEALTH CARE EDUCATION/TRAINING PROGRAM

## 2021-09-17 PROCEDURE — 90945 DIALYSIS ONE EVALUATION: CPT

## 2021-09-17 PROCEDURE — 85025 COMPLETE CBC W/AUTO DIFF WBC: CPT

## 2021-09-17 PROCEDURE — 99291 CRITICAL CARE FIRST HOUR: CPT | Performed by: INTERNAL MEDICINE

## 2021-09-17 PROCEDURE — 2000000000 HC ICU R&B

## 2021-09-17 PROCEDURE — 84100 ASSAY OF PHOSPHORUS: CPT

## 2021-09-17 PROCEDURE — 2500000003 HC RX 250 WO HCPCS: Performed by: SURGERY

## 2021-09-17 PROCEDURE — 6360000002 HC RX W HCPCS: Performed by: SURGERY

## 2021-09-17 PROCEDURE — 36415 COLL VENOUS BLD VENIPUNCTURE: CPT

## 2021-09-17 PROCEDURE — 87070 CULTURE OTHR SPECIMN AEROBIC: CPT

## 2021-09-17 PROCEDURE — 94003 VENT MGMT INPAT SUBQ DAY: CPT

## 2021-09-17 RX ORDER — SODIUM CHLORIDE 9 MG/ML
INJECTION, SOLUTION INTRAVENOUS CONTINUOUS
Status: ACTIVE | OUTPATIENT
Start: 2021-09-17 | End: 2021-09-17

## 2021-09-17 RX ADMIN — Medication: at 00:30

## 2021-09-17 RX ADMIN — SODIUM CHLORIDE: 9 INJECTION, SOLUTION INTRAVENOUS at 16:16

## 2021-09-17 RX ADMIN — Medication 10 ML: at 09:02

## 2021-09-17 RX ADMIN — PROPOFOL 30 MCG/KG/MIN: 10 INJECTION, EMULSION INTRAVENOUS at 20:35

## 2021-09-17 RX ADMIN — SODIUM BICARBONATE: 84 INJECTION, SOLUTION INTRAVENOUS at 06:30

## 2021-09-17 RX ADMIN — PIPERACILLIN AND TAZOBACTAM 3375 MG: 3; .375 INJECTION, POWDER, LYOPHILIZED, FOR SOLUTION INTRAVENOUS at 23:30

## 2021-09-17 RX ADMIN — HEPARIN SODIUM 5000 UNITS: 5000 INJECTION INTRAVENOUS; SUBCUTANEOUS at 05:35

## 2021-09-17 RX ADMIN — HEPARIN SODIUM 5000 UNITS: 5000 INJECTION INTRAVENOUS; SUBCUTANEOUS at 14:12

## 2021-09-17 RX ADMIN — Medication: at 10:49

## 2021-09-17 RX ADMIN — INSULIN LISPRO 1 UNITS: 100 INJECTION, SOLUTION INTRAVENOUS; SUBCUTANEOUS at 03:35

## 2021-09-17 RX ADMIN — Medication: at 22:01

## 2021-09-17 RX ADMIN — PROPOFOL 30 MCG/KG/MIN: 10 INJECTION, EMULSION INTRAVENOUS at 05:05

## 2021-09-17 RX ADMIN — CALCIUM GLUCONATE 1000 MG: 98 INJECTION, SOLUTION INTRAVENOUS at 20:42

## 2021-09-17 RX ADMIN — DEXAMETHASONE SODIUM PHOSPHATE 20 MG: 4 INJECTION, SOLUTION INTRAMUSCULAR; INTRAVENOUS at 09:08

## 2021-09-17 RX ADMIN — PIPERACILLIN AND TAZOBACTAM 3375 MG: 3; .375 INJECTION, POWDER, LYOPHILIZED, FOR SOLUTION INTRAVENOUS at 16:18

## 2021-09-17 RX ADMIN — HEPARIN SODIUM 5000 UNITS: 5000 INJECTION INTRAVENOUS; SUBCUTANEOUS at 21:47

## 2021-09-17 RX ADMIN — VASOPRESSIN 0.04 UNITS/MIN: 20 INJECTION INTRAVENOUS at 00:25

## 2021-09-17 RX ADMIN — INSULIN LISPRO 1 UNITS: 100 INJECTION, SOLUTION INTRAVENOUS; SUBCUTANEOUS at 08:47

## 2021-09-17 RX ADMIN — FAMOTIDINE 20 MG: 10 INJECTION, SOLUTION INTRAVENOUS at 09:02

## 2021-09-17 RX ADMIN — SODIUM BICARBONATE: 84 INJECTION, SOLUTION INTRAVENOUS at 10:53

## 2021-09-17 RX ADMIN — PROPOFOL 30 MCG/KG/MIN: 10 INJECTION, EMULSION INTRAVENOUS at 11:36

## 2021-09-17 RX ADMIN — CALCIUM GLUCONATE 1000 MG: 98 INJECTION, SOLUTION INTRAVENOUS at 01:50

## 2021-09-17 RX ADMIN — CALCIUM GLUCONATE 2000 MG: 98 INJECTION, SOLUTION INTRAVENOUS at 14:08

## 2021-09-17 RX ADMIN — Medication: at 10:51

## 2021-09-17 RX ADMIN — CALCIUM GLUCONATE 1000 MG: 98 INJECTION, SOLUTION INTRAVENOUS at 05:33

## 2021-09-17 RX ADMIN — SODIUM BICARBONATE: 84 INJECTION, SOLUTION INTRAVENOUS at 02:20

## 2021-09-17 RX ADMIN — SODIUM BICARBONATE: 84 INJECTION, SOLUTION INTRAVENOUS at 20:32

## 2021-09-17 RX ADMIN — SODIUM BICARBONATE: 84 INJECTION, SOLUTION INTRAVENOUS at 15:14

## 2021-09-17 RX ADMIN — INSULIN LISPRO 1 UNITS: 100 INJECTION, SOLUTION INTRAVENOUS; SUBCUTANEOUS at 21:47

## 2021-09-17 RX ADMIN — Medication: at 16:23

## 2021-09-17 RX ADMIN — Medication 10 ML: at 20:19

## 2021-09-17 RX ADMIN — PIPERACILLIN AND TAZOBACTAM 3375 MG: 3; .375 INJECTION, POWDER, LYOPHILIZED, FOR SOLUTION INTRAVENOUS at 08:34

## 2021-09-17 RX ADMIN — INSULIN LISPRO 1 UNITS: 100 INJECTION, SOLUTION INTRAVENOUS; SUBCUTANEOUS at 14:37

## 2021-09-17 RX ADMIN — DEXTROSE MONOHYDRATE 14 MCG/MIN: 50 INJECTION, SOLUTION INTRAVENOUS at 16:13

## 2021-09-17 ASSESSMENT — PAIN SCALES - GENERAL
PAINLEVEL_OUTOF10: 0

## 2021-09-17 ASSESSMENT — PULMONARY FUNCTION TESTS
PIF_VALUE: 23
PIF_VALUE: 18
PIF_VALUE: 21
PIF_VALUE: 22
PIF_VALUE: 22
PIF_VALUE: 27
PIF_VALUE: 23
PIF_VALUE: 19
PIF_VALUE: 38
PIF_VALUE: 21
PIF_VALUE: 26
PIF_VALUE: 25
PIF_VALUE: 21
PIF_VALUE: 23
PIF_VALUE: 27
PIF_VALUE: 22
PIF_VALUE: 27
PIF_VALUE: 21
PIF_VALUE: 22
PIF_VALUE: 26
PIF_VALUE: 28
PIF_VALUE: 21
PIF_VALUE: 17
PIF_VALUE: 22
PIF_VALUE: 22
PIF_VALUE: 21
PIF_VALUE: 23
PIF_VALUE: 22
PIF_VALUE: 23
PIF_VALUE: 24
PIF_VALUE: 26
PIF_VALUE: 27
PIF_VALUE: 22
PIF_VALUE: 21
PIF_VALUE: 21
PIF_VALUE: 27
PIF_VALUE: 22
PIF_VALUE: 24
PIF_VALUE: 23
PIF_VALUE: 21
PIF_VALUE: 23
PIF_VALUE: 21
PIF_VALUE: 18
PIF_VALUE: 21
PIF_VALUE: 21
PIF_VALUE: 22
PIF_VALUE: 21
PIF_VALUE: 22
PIF_VALUE: 21

## 2021-09-17 NOTE — PROGRESS NOTES
Son, Iwona Adair called and was provided an update on pt's condition and plan of care. Questions answered. Will continue to monitor.

## 2021-09-17 NOTE — PROGRESS NOTES
Clinical Pharmacy Progress Note    Vancomycin - Management by Pharmacy    Consult Date(s): 9/16/21  Consulting Provider(s): Dr. Anabel Hernandez    Vancomycin has been discontinued. Pharmacy will sign off. Please re-consult pharmacy if Vancomycin dosing is wanted in the future. Please call with questions.   Raoul Ernandez, PharmD  PGY-1 Pharmacy Resident  [id number]  9/17/2021 10:56 AM

## 2021-09-17 NOTE — PROGRESS NOTES
ICU Progress Note    Admit Date: 9/15/2021  Day: 2  Vent Day: 2  IV Access:Peripheral  IV Fluids:None  Vasopressors: levo and vaso                Antibiotics: vanc and merrem   Diet: Diet NPO    CC: SOB and chills     Interval history: No acute events overnight. Urine cultures were positive for E coli, blood cultures grew no growth till date. Patient seen today, is sedated and intubated. Sedated on propofol 30 and fentanyl incraesed to 25 as patient was more alert and coughing. Patient is on pressor support with Norepi 12 vaso 0.04. Current vent settings AC/PRVC Rate Set: 24 bmp/Vt Ordered: 450 mL/ /FiO2 : 45 %/ PEEP 5.     HPI:Mary Hernandez is a 71 y.o. female with pmhx of CKD, current smoker, who presented with 3 days of SOB and increased work of breathing.     In the ED, patient tachypnic with accessory muscle use and hypoxic, SBP in 200s. Placed on Bipap per RT. Patient had lab work that was consistent with acute renal failure. Nephorology was consulted for CRRT. Patient was also severely acidotic and started on Bicarb gtt after one amp push. Broad spectrum abx were started in ED. Nitro gtt started.    Denied any sick contacts, not COVID vaccinated. Has not had any chest pain associated. No orthopnea, PND, swelling. Patient stated that she has not had issues with breathing in past. Does not regularly follow with any physicians.     Medications:     Scheduled Meds:   insulin lispro  0-6 Units SubCUTAneous Q6H    vancomycin (VANCOCIN) intermittent dosing (placeholder)   Other RX Placeholder    dexamethasone  20 mg IntraVENous Q24H    Followed by   Michael  ON 9/22/2021] dexamethasone  10 mg IntraVENous Q24H    famotidine (PEPCID) injection  20 mg IntraVENous Daily    piperacillin-tazobactam  3,375 mg IntraVENous Q8H    sodium chloride flush  5-40 mL IntraVENous 2 times per day    heparin (porcine)  5,000 Units SubCUTAneous 3 times per day     Continuous Infusions:   prismaSATE BGK 4/2.5 500 mL/hr at 09/17/21 0030    prismaSATE BGK 4/2.5 1,000 mL/hr at 09/17/21 0030    dextrose      propofol 30 mcg/kg/min (09/17/21 0505)    fentaNYL 2000 mcg/200 mL IV infusion 12.5 mcg/hr (09/16/21 0900)    norepinephrine 8 mcg/min (09/17/21 0655)    vasopressin (Septic Shock) infusion 0.04 Units/min (09/17/21 0605)    IV infusion builder 200 mL/hr at 09/17/21 0630    sodium chloride       PRN Meds:sodium phosphate IVPB **OR** sodium phosphate IVPB **OR** sodium phosphate IVPB **OR** sodium phosphate IVPB, calcium gluconate **OR** calcium gluconate **OR** calcium gluconate **OR** calcium gluconate, glucose, dextrose, glucagon (rDNA), dextrose, sodium chloride, sodium chloride flush, sodium chloride, ondansetron **OR** ondansetron, polyethylene glycol, acetaminophen **OR** acetaminophen    Objective:   Vitals:   T-max:  Patient Vitals for the past 8 hrs:   BP Temp Temp src Pulse Resp SpO2 Weight   09/17/21 0807 -- -- -- 79 -- 100 % --   09/17/21 0715 -- -- -- 76 24 -- --   09/17/21 0700 (!) 125/95 -- -- 71 24 99 % --   09/17/21 0645 -- -- -- 65 24 100 % --   09/17/21 0630 -- -- -- 65 24 100 % --   09/17/21 0615 -- -- -- 68 24 100 % --   09/17/21 0600 127/83 -- -- 68 24 100 % --   09/17/21 0545 -- -- -- 73 24 100 % --   09/17/21 0530 -- -- -- 74 24 99 % --   09/17/21 0515 -- -- -- 75 24 100 % --   09/17/21 0500 126/85 -- -- 75 24 -- --   09/17/21 0445 -- -- -- 78 24 -- --   09/17/21 0430 -- -- -- 79 24 -- --   09/17/21 0415 -- -- -- 81 24 -- --   09/17/21 0400 94/75 98.4 °F (36.9 °C) Bladder 75 24 -- --   09/17/21 0345 -- -- -- 78 24 -- --   09/17/21 0330 -- -- -- 74 24 -- --   09/17/21 0315 -- -- -- 73 24 99 % --   09/17/21 0300 112/85 -- -- 74 24 100 % --   09/17/21 0245 -- -- -- 76 24 100 % --   09/17/21 0230 -- -- -- 75 24 100 % --   09/17/21 0215 -- -- -- 81 24 100 % --   09/17/21 0200 91/73 -- -- 88 24 98 % 135 lb 2.3 oz (61.3 kg)   09/17/21 0145 -- -- -- 93 27 -- --   09/17/21 0130 -- -- -- 93 (!) 31 91 % -- 09/17/21 0115 -- -- -- 96 24 100 % --   09/17/21 0100 115/84 -- -- 97 27 100 % --   09/17/21 0045 -- -- -- 95 26 100 % --   09/17/21 0030 -- -- -- 96 26 100 % --       Intake/Output Summary (Last 24 hours) at 9/17/2021 0820  Last data filed at 9/17/2021 0700  Gross per 24 hour   Intake 1693 ml   Output 2120 ml   Net -427 ml       Review of Systems   All other systems reviewed and are negative. Physical Exam  Constitutional:       Appearance: She is normal weight. She is ill-appearing. Interventions: She is sedated and intubated. HENT:      Head: Normocephalic and atraumatic. Right Ear: External ear normal.      Left Ear: External ear normal.      Nose: Nose normal.      Mouth/Throat:      Mouth: Mucous membranes are moist.   Eyes:      Pupils: Pupils are equal, round, and reactive to light. Cardiovascular:      Rate and Rhythm: Normal rate and regular rhythm. Pulses: Normal pulses. Heart sounds: Normal heart sounds. Pulmonary:      Effort: Pulmonary effort is normal. She is intubated. Breath sounds: Rhonchi present. Abdominal:      General: There is no distension. Palpations: Abdomen is soft. Musculoskeletal:         General: No swelling. Skin:     General: Skin is warm and dry.         LABS:    CBC:   Recent Labs     09/15/21  2038 09/16/21  0958 09/17/21  0332   WBC 18.1* 29.6* 30.3*   HGB 11.9* 10.1* 9.3*   HCT 36.8 30.0* 26.9*    245 203   MCV 96.5 91.6 90.8     Renal:    Recent Labs     09/16/21  0400 09/16/21  0400 09/16/21  0958 09/16/21  2152 09/17/21  0332      < > 132* 131* 132*   K 4.2   < > 3.6 4.2 4.4   CL 95*   < > 91* 89* 88*   CO2 16*   < > 19* 25 29   BUN 76*   < > 59* 41* 39*   CREATININE 7.6*   < > 5.4* 3.8* 3.3*   GLUCOSE 221*   < > 216* 196* 177*   CALCIUM 7.4*   < > 7.6* 7.8* 7.9*   MG  --   --  1.40*  --  2.30   PHOS 5.8*  --   --  4.6  --    ANIONGAP 26*   < > 22* 17* 15    < > = values in this interval not displayed.      Hepatic: Recent Labs     09/16/21  0150 09/16/21  0400 09/16/21  2152   AST 39* 40* 37   ALT 19 20 18   BILITOT <0.2 <0.2 <0.2   BILIDIR <0.2  --   --    PROT 7.4 7.0 6.6   LABALBU 2.6* 2.7* 2.1*   ALKPHOS 116 103 94     Troponin:   Recent Labs     09/15/21  2038 09/16/21  0958   TROPONINI 0.14* 0.10*     BNP: No results for input(s): BNP in the last 72 hours. Lipids: No results for input(s): CHOL, HDL in the last 72 hours. Invalid input(s): LDLCALCU, TRIGLYCERIDE  ABGs:    Recent Labs     09/16/21  0402 09/16/21  0906 09/16/21  1805   PHART 7.241* 7.384 7.456*   IFQ2UDQ 38.6 36.0 36.0   PO2ART 302.0* 142.9* 118.6*   ZSG2SOW 16.6* 21.5 25.4   BEART -11* -4* 2   P8BJQWRK 100 99 99   DFZ7LKD 18 23 27       INR: No results for input(s): INR in the last 72 hours. Lactate:   Recent Labs     09/16/21  0402 09/16/21  1805   LACTATE 1.70 1.75     Cultures:  -----------------------------------------------------------------  RAD:   XR CHEST PORTABLE   Final Result   Worsening bilateral pulmonary infiltrates. Placement of endotracheal    tube as above. XR CHEST PORTABLE   Final Result   1. Right IJ line projects over the mid SVC. No pneumothorax. 2.  Unchanged multifocal airspace disease. XR CHEST PORTABLE   Final Result    Patchy bilateral airspace opacities greatest in the left lung base suggestive of multifocal pneumonia. Assessment/Plan:   Mandi Stewart is a 73 y.o. female, who presented with 3 days SOB, hypoxic in ED with acute renal failure.     Acute hypoxic respiratory failure 2/2 multifocal pneumonia, COVID-19  On BIPAP in ED. Cultures pending.  COVID rapid +  - sedated and intubated day 1   - sedation- propofol 30 mcg/kg/min and fentanyl- 25mcg/hr  - Vent Settings: Vent Mode: AC/PRVC Rate Set: 24 bmp/Vt Ordered: 450 mL/ /FiO2 : 45 %/ PEEP 5   - discontinue Vanc and continue Zosyn  - Rapid COVID test positive  - got decadron 6m in ED  - continue decadron 20 mg I/V for 5 days followed by 10 mg I/V daily   - will follow up ABG  - discontinued remdesivir   - received dose of toci on 9/16    Elevated leukocytosis   Recent 30.3   -urine culture positive for E coli, continue abx   -blood cultures grow nothing till date   -consider plan CT abdomen to rule out any intra abdominal pathology      Hypertensive emergency now septic shock  - s/p Nitroglycerin gtt  - on norepinephrine 12 mcg/min vaso 0.04     Acute renal failure  History of CKD  - Nephrology folllowing  - on CRRT   - will follow renal panel closely  - daily weights, IOs     AG metabolic acidosis, improved  Likely secondary to renal failure. S/p Got bicarb in ED  - CRRT      NSTEMI likely type 2  Likely 2/2 demand mismatch in setting of infection, renal failure  - will trend troponin, downtrended     Hyperglycemia on decardon  199 on RFP at admission, recent 177  - will monitor  - hypoglycemia protocol  - LDSSI       Code Status: Full code   FEN: NPO  PPX: heparin  DISPO: ICU    Robb Esposito MD, PGY-1  09/17/21  8:20 AM    This patient has been staffed and discussed with Dr Tatiana Dos Santos. Patient seen, examined and discussed with the resident and I agree with the assessment and plan. Vent Mode: AC/PRVC Rate Set: 24 bmp/Vt Ordered: 450 mL/ /FiO2 : 45 %  PEEP 5  Recent Labs     09/16/21  1805 09/17/21  0844   PHART 7.456* 7.446   HNG8GTV 36.0 45.5*   PO2ART 118.6* 69.9*     Remains Intubated and sedated and on pressors. Pressor requirements are increasing with fluid removal.  Gave a fluid bolus and patient is fluid responsive. Will need to keep patient net positive despite her renal failure. Narrowing abx with positive e.coli in urine and negative MRSA swab. Vent requirements have down trended but she's not ready for a wean given her hemodynamic issues.       Critical care time spent reviewing labs/films, examining patient, collaborating with other physicians but excluding procedures for life threatening organ failure is 28 minutes.       Efrain Ocampo MD

## 2021-09-17 NOTE — PROGRESS NOTES
CRRT filter and TMP pressures high, pt's blood returned and new set up will be started. Will continue to monitor.

## 2021-09-17 NOTE — PROGRESS NOTES
Pt appears more awake and coughing on vent, pt's fentanyl gtt was increased. Will continue to monitor.

## 2021-09-17 NOTE — CARE COORDINATION
Case Management Assessment           Daily Note                 Date/ Time of Note: 9/17/2021 12:06 PM         Note completed by: Chelly Izaguirre RN    Patient Name: Kristen Golden  YOB: 1950    Diagnosis:Metabolic acidosis [C11.3]  Acute renal failure (ARF) ([address] [N17.9]  Acute respiratory failure with hypoxia ([address] [J96.01]  Acute renal failure, unspecified acute renal failure type ([address] [N17.9]  Pneumonia of left lower lobe due to infectious organism [J18.9]  Suspected COVID-19 virus infection [Z20.822]  Patient Admission Status: Inpatient    Date of Admission:9/15/2021  8:00 PM Length of Stay: 2 GLOS: GMLOS: 4.8    Current Plan of Care: remains intubated/sedated/pressor support and CRRT  ________________________________________________________________________________________  PT AM-PAC:   / 24 per last evaluation on: TBD    OT AM-PAC:   / 24 per last evaluation on: TBD    DME Needs for discharge: TBD  ________________________________________________________________________________________  Discharge Plan: To Be Determined DUE TO: remains intubated and sedated/CRRT    Tentative discharge date: TBD    Current barriers to discharge: not ready for vent wean due to hemodynamic issues    Referrals completed: Not Applicable    Resources/ information provided: Not indicated at this time  ________________________________________________________________________________________  Case Management Notes:Patient is from home with her brother at baseline. Per her son she will most likely need placement at d/c.     Young Pedroza and her family were provided with choice of provider; she and her family are in agreement with the discharge plan.     Care Transition Patient: Mariela Izaguirre RN  [facility]  Case Management Department  Ph: [phone]  Fax: [phone]

## 2021-09-17 NOTE — PLAN OF CARE
Problem: Non-Violent Restraints  Goal: Removal from restraints as soon as assessed to be safe  9/16/2021 2030 by Jose Antonio Pollard RN  Outcome: Ongoing     Problem: Non-Violent Restraints  Goal: No harm/injury to patient while restraints in use  Outcome: Ongoing     Problem: Non-Violent Restraints  Goal: Patient's dignity will be maintained  Outcome: Ongoing     Problem: Airway Clearance - Ineffective  Goal: Achieve or maintain patent airway  9/16/2021 2030 by Jose Antonio Pollard RN  Outcome: Ongoing     Problem: Gas Exchange - Impaired  Goal: Absence of hypoxia  9/16/2021 2030 by Jose Antonio Pollard RN  Outcome: Ongoing     Problem:  Body Temperature -  Risk of, Imbalanced  Goal: Ability to maintain a body temperature within defined limits  9/16/2021 2030 by Jose Antonio Pollard RN  Outcome: Ongoing     Problem: Risk for Fluid Volume Deficit  Goal: Maintain normal heart rhythm  9/16/2021 2030 by Jose Antonio Pollard RN  Outcome: Ongoing     Problem: Skin Integrity:  Goal: Will show no infection signs and symptoms  Description: Will show no infection signs and symptoms  9/16/2021 2030 by Jose Antonio Pollard RN  Outcome: Ongoing

## 2021-09-17 NOTE — PLAN OF CARE
Problem: Falls - Risk of:  Goal: Will remain free from falls  Description: Will remain free from falls  Outcome: Ongoing  Goal: Absence of physical injury  Description: Absence of physical injury  Outcome: Ongoing     Problem: Non-Violent Restraints  Goal: Removal from restraints as soon as assessed to be safe  Outcome: Ongoing  Goal: No harm/injury to patient while restraints in use  Outcome: Ongoing  Goal: Patient's dignity will be maintained  Outcome: Ongoing     Problem: Airway Clearance - Ineffective  Goal: Achieve or maintain patent airway  Outcome: Ongoing     Problem: Gas Exchange - Impaired  Goal: Absence of hypoxia  Outcome: Ongoing  Goal: Promote optimal lung function  Outcome: Ongoing     Problem: Breathing Pattern - Ineffective  Goal: Ability to achieve and maintain a regular respiratory rate  Outcome: Ongoing     Problem:  Body Temperature -  Risk of, Imbalanced  Goal: Ability to maintain a body temperature within defined limits  Outcome: Ongoing  Goal: Will regain or maintain usual level of consciousness  Outcome: Ongoing  Goal: Complications related to the disease process, condition or treatment will be avoided or minimized  Outcome: Ongoing     Problem: Isolation Precautions - Risk of Spread of Infection  Goal: Prevent transmission of infection  Outcome: Ongoing     Problem: Nutrition Deficits  Goal: Optimize nutritional status  Outcome: Ongoing     Problem: Risk for Fluid Volume Deficit  Goal: Maintain normal heart rhythm  Outcome: Ongoing  Goal: Maintain absence of muscle cramping  Outcome: Ongoing  Goal: Maintain normal serum potassium, sodium, calcium, phosphorus, and pH  Outcome: Ongoing     Problem: Skin Integrity:  Goal: Will show no infection signs and symptoms  Description: Will show no infection signs and symptoms  Outcome: Ongoing  Goal: Absence of new skin breakdown  Description: Absence of new skin breakdown  Outcome: Ongoing

## 2021-09-18 LAB
A/G RATIO: 0.7 (ref 1.1–2.2)
A/G RATIO: 0.7 (ref 1.1–2.2)
ALBUMIN SERPL-MCNC: 2.4 G/DL (ref 3.4–5)
ALP BLD-CCNC: 62 U/L (ref 40–129)
ALP BLD-CCNC: 62 U/L (ref 40–129)
ALP BLD-CCNC: 63 U/L (ref 40–129)
ALT SERPL-CCNC: 13 U/L (ref 10–40)
ALT SERPL-CCNC: 13 U/L (ref 10–40)
ALT SERPL-CCNC: 14 U/L (ref 10–40)
ANION GAP SERPL CALCULATED.3IONS-SCNC: 11 MMOL/L (ref 3–16)
ANION GAP SERPL CALCULATED.3IONS-SCNC: 12 MMOL/L (ref 3–16)
ANION GAP SERPL CALCULATED.3IONS-SCNC: 12 MMOL/L (ref 3–16)
ANISOCYTOSIS: ABNORMAL
AST SERPL-CCNC: 22 U/L (ref 15–37)
AST SERPL-CCNC: 24 U/L (ref 15–37)
AST SERPL-CCNC: 24 U/L (ref 15–37)
BANDED NEUTROPHILS RELATIVE PERCENT: 9 % (ref 0–7)
BASE EXCESS ARTERIAL: 12 (ref -3–3)
BASOPHILS ABSOLUTE: 0 K/UL (ref 0–0.2)
BASOPHILS ABSOLUTE: 0 K/UL (ref 0–0.2)
BASOPHILS RELATIVE PERCENT: 0 %
BASOPHILS RELATIVE PERCENT: 0.1 %
BILIRUB SERPL-MCNC: 0.3 MG/DL (ref 0–1)
BILIRUB SERPL-MCNC: <0.2 MG/DL (ref 0–1)
BILIRUB SERPL-MCNC: <0.2 MG/DL (ref 0–1)
BILIRUBIN DIRECT: <0.2 MG/DL (ref 0–0.3)
BILIRUBIN, INDIRECT: ABNORMAL MG/DL (ref 0–1)
BUN BLDV-MCNC: 26 MG/DL (ref 7–20)
BUN BLDV-MCNC: 26 MG/DL (ref 7–20)
BUN BLDV-MCNC: 29 MG/DL (ref 7–20)
CALCIUM IONIZED: 0.95 MMOL/L (ref 1.12–1.32)
CALCIUM IONIZED: 0.96 MMOL/L (ref 1.12–1.32)
CALCIUM IONIZED: 0.98 MMOL/L (ref 1.12–1.32)
CALCIUM IONIZED: 1.02 MMOL/L (ref 1.12–1.32)
CALCIUM IONIZED: 1.06 MMOL/L (ref 1.12–1.32)
CALCIUM SERPL-MCNC: 7.6 MG/DL (ref 8.3–10.6)
CALCIUM SERPL-MCNC: 7.8 MG/DL (ref 8.3–10.6)
CALCIUM SERPL-MCNC: 8.1 MG/DL (ref 8.3–10.6)
CHLORIDE BLD-SCNC: 89 MMOL/L (ref 99–110)
CHLORIDE BLD-SCNC: 91 MMOL/L (ref 99–110)
CHLORIDE BLD-SCNC: 95 MMOL/L (ref 99–110)
CO2: 29 MMOL/L (ref 21–32)
CO2: 31 MMOL/L (ref 21–32)
CO2: 32 MMOL/L (ref 21–32)
CREAT SERPL-MCNC: 2.2 MG/DL (ref 0.6–1.2)
CREAT SERPL-MCNC: 2.3 MG/DL (ref 0.6–1.2)
CREAT SERPL-MCNC: 2.5 MG/DL (ref 0.6–1.2)
EOSINOPHILS ABSOLUTE: 0 K/UL (ref 0–0.6)
EOSINOPHILS ABSOLUTE: 0 K/UL (ref 0–0.6)
EOSINOPHILS RELATIVE PERCENT: 0 %
EOSINOPHILS RELATIVE PERCENT: 0 %
GFR AFRICAN AMERICAN: 23
GFR AFRICAN AMERICAN: 25
GFR AFRICAN AMERICAN: 27
GFR NON-AFRICAN AMERICAN: 19
GFR NON-AFRICAN AMERICAN: 21
GFR NON-AFRICAN AMERICAN: 22
GLOBULIN: 3.6 G/DL
GLOBULIN: 3.6 G/DL
GLUCOSE BLD-MCNC: 130 MG/DL (ref 70–99)
GLUCOSE BLD-MCNC: 142 MG/DL (ref 70–99)
GLUCOSE BLD-MCNC: 144 MG/DL (ref 70–99)
GLUCOSE BLD-MCNC: 153 MG/DL (ref 70–99)
GLUCOSE BLD-MCNC: 155 MG/DL (ref 70–99)
GLUCOSE BLD-MCNC: 164 MG/DL (ref 70–99)
GLUCOSE BLD-MCNC: 164 MG/DL (ref 70–99)
HAPTOGLOBIN: 401 MG/DL (ref 30–200)
HCO3 ARTERIAL: 35.3 MMOL/L (ref 21–29)
HCT VFR BLD CALC: 21.6 % (ref 36–48)
HCT VFR BLD CALC: 21.7 % (ref 36–48)
HEMOGLOBIN: 7.2 G/DL (ref 12–16)
HEMOGLOBIN: 7.2 G/DL (ref 12–16)
HYPOCHROMIA: ABNORMAL
LACTATE DEHYDROGENASE: 343 U/L (ref 100–190)
LIPASE: 96 U/L (ref 13–60)
LYMPHOCYTES ABSOLUTE: 0 K/UL (ref 1–5.1)
LYMPHOCYTES ABSOLUTE: 0.2 K/UL (ref 1–5.1)
LYMPHOCYTES RELATIVE PERCENT: 0 %
LYMPHOCYTES RELATIVE PERCENT: 1.2 %
MAGNESIUM: 2.2 MG/DL (ref 1.8–2.4)
MCH RBC QN AUTO: 31 PG (ref 26–34)
MCH RBC QN AUTO: 31 PG (ref 26–34)
MCHC RBC AUTO-ENTMCNC: 33.2 G/DL (ref 31–36)
MCHC RBC AUTO-ENTMCNC: 33.3 G/DL (ref 31–36)
MCV RBC AUTO: 93.1 FL (ref 80–100)
MCV RBC AUTO: 93.4 FL (ref 80–100)
MONOCYTES ABSOLUTE: 0 K/UL (ref 0–1.3)
MONOCYTES ABSOLUTE: 0.5 K/UL (ref 0–1.3)
MONOCYTES RELATIVE PERCENT: 0 %
MONOCYTES RELATIVE PERCENT: 2.7 %
NEUTROPHILS ABSOLUTE: 18.8 K/UL (ref 1.7–7.7)
NEUTROPHILS ABSOLUTE: 21.4 K/UL (ref 1.7–7.7)
NEUTROPHILS RELATIVE PERCENT: 91 %
NEUTROPHILS RELATIVE PERCENT: 96 %
O2 SAT, ARTERIAL: 94 % (ref 93–100)
PCO2 ARTERIAL: 47.8 MM HG (ref 35–45)
PDW BLD-RTO: 15.8 % (ref 12.4–15.4)
PDW BLD-RTO: 16.1 % (ref 12.4–15.4)
PERFORMED ON: ABNORMAL
PH ARTERIAL: 7.48 (ref 7.35–7.45)
PH VENOUS: 7.43 (ref 7.35–7.45)
PH VENOUS: 7.47 (ref 7.35–7.45)
PH VENOUS: 7.49 (ref 7.35–7.45)
PH VENOUS: 7.5 (ref 7.35–7.45)
PH VENOUS: 7.57 (ref 7.35–7.45)
PHOSPHORUS: 3.2 MG/DL (ref 2.5–4.9)
PHOSPHORUS: 3.4 MG/DL (ref 2.5–4.9)
PLATELET # BLD: 172 K/UL (ref 135–450)
PLATELET # BLD: 174 K/UL (ref 135–450)
PMV BLD AUTO: 8.1 FL (ref 5–10.5)
PMV BLD AUTO: 8.5 FL (ref 5–10.5)
PO2 ARTERIAL: 65.7 MM HG (ref 75–108)
POC SAMPLE TYPE: ABNORMAL
POTASSIUM REFLEX MAGNESIUM: 4.1 MMOL/L (ref 3.5–5.1)
POTASSIUM SERPL-SCNC: 4.3 MMOL/L (ref 3.5–5.1)
POTASSIUM SERPL-SCNC: 4.5 MMOL/L (ref 3.5–5.1)
RBC # BLD: 2.31 M/UL (ref 4–5.2)
RBC # BLD: 2.32 M/UL (ref 4–5.2)
SODIUM BLD-SCNC: 132 MMOL/L (ref 136–145)
SODIUM BLD-SCNC: 134 MMOL/L (ref 136–145)
SODIUM BLD-SCNC: 136 MMOL/L (ref 136–145)
TCO2 ARTERIAL: 37 MMOL/L
TOTAL PROTEIN: 6 G/DL (ref 6.4–8.2)
TOTAL PROTEIN: 6 G/DL (ref 6.4–8.2)
TOTAL PROTEIN: 6.1 G/DL (ref 6.4–8.2)
WBC # BLD: 19.6 K/UL (ref 4–11)
WBC # BLD: 21.4 K/UL (ref 4–11)

## 2021-09-18 PROCEDURE — 82803 BLOOD GASES ANY COMBINATION: CPT

## 2021-09-18 PROCEDURE — 83010 ASSAY OF HAPTOGLOBIN QUANT: CPT

## 2021-09-18 PROCEDURE — 99291 CRITICAL CARE FIRST HOUR: CPT | Performed by: INTERNAL MEDICINE

## 2021-09-18 PROCEDURE — 82330 ASSAY OF CALCIUM: CPT

## 2021-09-18 PROCEDURE — 2500000003 HC RX 250 WO HCPCS

## 2021-09-18 PROCEDURE — 85025 COMPLETE CBC W/AUTO DIFF WBC: CPT

## 2021-09-18 PROCEDURE — 90947 DIALYSIS REPEATED EVAL: CPT | Performed by: HOSPITALIST

## 2021-09-18 PROCEDURE — 90945 DIALYSIS ONE EVALUATION: CPT

## 2021-09-18 PROCEDURE — 84100 ASSAY OF PHOSPHORUS: CPT

## 2021-09-18 PROCEDURE — 6360000002 HC RX W HCPCS: Performed by: STUDENT IN AN ORGANIZED HEALTH CARE EDUCATION/TRAINING PROGRAM

## 2021-09-18 PROCEDURE — 83690 ASSAY OF LIPASE: CPT

## 2021-09-18 PROCEDURE — 6360000002 HC RX W HCPCS: Performed by: SURGERY

## 2021-09-18 PROCEDURE — 36415 COLL VENOUS BLD VENIPUNCTURE: CPT

## 2021-09-18 PROCEDURE — 2500000003 HC RX 250 WO HCPCS: Performed by: STUDENT IN AN ORGANIZED HEALTH CARE EDUCATION/TRAINING PROGRAM

## 2021-09-18 PROCEDURE — 2580000003 HC RX 258: Performed by: HOSPITALIST

## 2021-09-18 PROCEDURE — 83615 LACTATE (LD) (LDH) ENZYME: CPT

## 2021-09-18 PROCEDURE — 2700000000 HC OXYGEN THERAPY PER DAY

## 2021-09-18 PROCEDURE — 51798 US URINE CAPACITY MEASURE: CPT

## 2021-09-18 PROCEDURE — 80053 COMPREHEN METABOLIC PANEL: CPT

## 2021-09-18 PROCEDURE — 2000000000 HC ICU R&B

## 2021-09-18 PROCEDURE — 2580000003 HC RX 258: Performed by: STUDENT IN AN ORGANIZED HEALTH CARE EDUCATION/TRAINING PROGRAM

## 2021-09-18 PROCEDURE — 37799 UNLISTED PX VASCULAR SURGERY: CPT

## 2021-09-18 PROCEDURE — 6360000002 HC RX W HCPCS: Performed by: HOSPITALIST

## 2021-09-18 PROCEDURE — 83735 ASSAY OF MAGNESIUM: CPT

## 2021-09-18 PROCEDURE — 94761 N-INVAS EAR/PLS OXIMETRY MLT: CPT

## 2021-09-18 PROCEDURE — 94003 VENT MGMT INPAT SUBQ DAY: CPT

## 2021-09-18 RX ADMIN — HEPARIN SODIUM 5000 UNITS: 5000 INJECTION INTRAVENOUS; SUBCUTANEOUS at 06:00

## 2021-09-18 RX ADMIN — FAMOTIDINE 20 MG: 10 INJECTION, SOLUTION INTRAVENOUS at 08:31

## 2021-09-18 RX ADMIN — INSULIN LISPRO 1 UNITS: 100 INJECTION, SOLUTION INTRAVENOUS; SUBCUTANEOUS at 21:48

## 2021-09-18 RX ADMIN — Medication: at 02:58

## 2021-09-18 RX ADMIN — HEPARIN SODIUM 5000 UNITS: 5000 INJECTION INTRAVENOUS; SUBCUTANEOUS at 21:48

## 2021-09-18 RX ADMIN — CALCIUM GLUCONATE 1000 MG: 98 INJECTION, SOLUTION INTRAVENOUS at 23:55

## 2021-09-18 RX ADMIN — Medication 10 ML: at 08:28

## 2021-09-18 RX ADMIN — Medication 10 ML: at 20:51

## 2021-09-18 RX ADMIN — PROPOFOL 30 MCG/KG/MIN: 10 INJECTION, EMULSION INTRAVENOUS at 23:55

## 2021-09-18 RX ADMIN — CALCIUM GLUCONATE 1000 MG: 98 INJECTION, SOLUTION INTRAVENOUS at 05:26

## 2021-09-18 RX ADMIN — CALCIUM GLUCONATE 1000 MG: 98 INJECTION, SOLUTION INTRAVENOUS at 12:20

## 2021-09-18 RX ADMIN — SODIUM BICARBONATE: 84 INJECTION, SOLUTION INTRAVENOUS at 05:11

## 2021-09-18 RX ADMIN — Medication: at 18:02

## 2021-09-18 RX ADMIN — PROPOFOL 30 MCG/KG/MIN: 10 INJECTION, EMULSION INTRAVENOUS at 09:28

## 2021-09-18 RX ADMIN — INSULIN LISPRO 1 UNITS: 100 INJECTION, SOLUTION INTRAVENOUS; SUBCUTANEOUS at 08:31

## 2021-09-18 RX ADMIN — PROPOFOL 30 MCG/KG/MIN: 10 INJECTION, EMULSION INTRAVENOUS at 17:44

## 2021-09-18 RX ADMIN — Medication: at 20:10

## 2021-09-18 RX ADMIN — PROPOFOL 30 MCG/KG/MIN: 10 INJECTION, EMULSION INTRAVENOUS at 04:15

## 2021-09-18 RX ADMIN — PIPERACILLIN AND TAZOBACTAM 3375 MG: 3; .375 INJECTION, POWDER, LYOPHILIZED, FOR SOLUTION INTRAVENOUS at 16:58

## 2021-09-18 RX ADMIN — Medication: at 08:04

## 2021-09-18 RX ADMIN — Medication: at 13:14

## 2021-09-18 RX ADMIN — HEPARIN SODIUM 5000 UNITS: 5000 INJECTION INTRAVENOUS; SUBCUTANEOUS at 14:20

## 2021-09-18 RX ADMIN — CALCIUM GLUCONATE 1000 MG: 98 INJECTION, SOLUTION INTRAVENOUS at 18:56

## 2021-09-18 RX ADMIN — PIPERACILLIN AND TAZOBACTAM 3375 MG: 3; .375 INJECTION, POWDER, LYOPHILIZED, FOR SOLUTION INTRAVENOUS at 23:55

## 2021-09-18 RX ADMIN — PIPERACILLIN AND TAZOBACTAM 3375 MG: 3; .375 INJECTION, POWDER, LYOPHILIZED, FOR SOLUTION INTRAVENOUS at 08:22

## 2021-09-18 RX ADMIN — DEXAMETHASONE SODIUM PHOSPHATE 20 MG: 4 INJECTION, SOLUTION INTRAMUSCULAR; INTRAVENOUS at 08:25

## 2021-09-18 RX ADMIN — SODIUM BICARBONATE: 84 INJECTION, SOLUTION INTRAVENOUS at 00:56

## 2021-09-18 RX ADMIN — Medication: at 08:03

## 2021-09-18 RX ADMIN — SODIUM BICARBONATE: 84 INJECTION, SOLUTION INTRAVENOUS at 09:35

## 2021-09-18 RX ADMIN — INSULIN LISPRO 1 UNITS: 100 INJECTION, SOLUTION INTRAVENOUS; SUBCUTANEOUS at 03:59

## 2021-09-18 RX ADMIN — CALCIUM GLUCONATE 1000 MG: 98 INJECTION, SOLUTION INTRAVENOUS at 01:50

## 2021-09-18 ASSESSMENT — PAIN SCALES - GENERAL
PAINLEVEL_OUTOF10: 0

## 2021-09-18 ASSESSMENT — PULMONARY FUNCTION TESTS
PIF_VALUE: 22
PIF_VALUE: 25
PIF_VALUE: 21
PIF_VALUE: 24
PIF_VALUE: 20
PIF_VALUE: 35
PIF_VALUE: 25
PIF_VALUE: 24
PIF_VALUE: 24

## 2021-09-18 NOTE — PROGRESS NOTES
5 ml of urine output this shift. Salazar removed per nurse driven protocol.  Salazar/ vilma care provided before and after removal.

## 2021-09-18 NOTE — PROGRESS NOTES
Anyi Patron : [phone]     Fax :[phone]        Renal  Note    Patient:  Charley Arellano  MRN: [id number]    CHIEF COMPLAINT:  SOB, chills    History Obtained From:  electronic medical record  PCP: Referring Not In System (Inactive)    HISTORY OF PRESENT ILLNESS:   Charley Arellano is a 73 y.o. female with pmhx of CKD, HTN, PUD, pre-diabetes, current smoker, who presented with 3 days of SOB and increased work of breathing. In the ED, patient tachypnic with accessory muscle use and hypoxic, SBP in 200s. COVID +ve (not vaccinated). He was initially placed on Bipap  But resp failure worsened, requirng intubation. Patient had lab work that was consistent with acute renal failure severe acidosis Broad spectrum abx were and Nitro gtt was started. Nephorology was consulted for ARF management w/ CRRT. This AM pt was seen and examined at bedside. CRRT is running. She is sedated, intubated and mechanically ventilated. She is off Levo.  Urine output minimal.       Past Medical History:     OA (osteoarthritis)    Hypertension    Personal history of tobacco use, presenting hazards to health    Lower limb length difference    Current smoker    Renal and perinephric abscess    Acute renal failure (HCC)    Anemia, unspecified    Routine adult health maintenance    Peptic ulcer, unspecified site, unspecified as acute or chronic, without mention of hemorrhage, perforation, or obstruction    Right hip pain    DDD (degenerative disc disease), lumbosacral    External thrombosed hemorrhoids    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 Medications Prior to Admission:    Prior to Admission medications    Medication Sig Start Date End Date Taking? Authorizing Provider   albuterol sulfate  (90 Base) MCG/ACT inhaler  9/7/21   Historical Provider, MD   atenolol (TENORMIN) 25 MG tablet Take 25 mg by mouth daily    Historical Provider, MD   ibuprofen (ADVIL;MOTRIN) 600 MG tablet TAKE 1 TABLET BY MOUTH THREE TIMES A DAY WITH FOOD 8/2/21   Historical Provider, MD       Allergies:  Patient has no known allergies. Social History:   TOBACCO:   reports that she has been smoking cigarettes. She has been smoking about 0.50 packs per day. She has never used smokeless tobacco.  ETOH:   reports previous alcohol use. OCCUPATION:      Family History:   History reviewed. No pertinent family history.         Physical Exam:    Vitals: /88   Pulse 72   Temp 98.3 °F (36.8 °C) (Axillary)   Resp 16   Ht 5' 4\" (1.626 m)   Wt 138 lb 10.7 oz (62.9 kg)   SpO2 96%   BMI 23.80 kg/m²   Constitutional:  intubated  HEENT:  ETT in place  Neck: unable to assess JVD  Cardiovascular:  S1, S2 reg  Respiratory: symmetric lung expansion  Abdomen:  +BS, soft, ND  Ext: + lower extremity edema  Skin: dry/intact  CNS: sedated     CBC:   Recent Labs     09/16/21  0958 09/17/21  0332 09/18/21  0351   WBC 29.6* 30.3* 21.4*   HGB 10.1* 9.3* 7.2*    203 172     BMP:    Recent Labs     09/17/21  0959 09/17/21  2145 09/18/21  0351   * 133* 132*   K 4.1 4.1 4.1   CL 89* 92* 89*   CO2 30 30 31   BUN 36* 30* 29*   CREATININE 2.8* 2.8* 2.5*   GLUCOSE 168* 173* 164*     Hepatic:   Recent Labs 09/16/21 2152 09/17/21  0959 09/17/21  2145   AST 37 35 28   ALT 18 18 15   BILITOT <0.2 <0.2 <0.2   ALKPHOS 94 91 70     Troponin:   Recent Labs     09/15/21  2038 09/16/21  0958   TROPONINI 0.14* 0.10*     BNP: No results for input(s): BNP in the last 72 hours. Lipids: No results for input(s): CHOL, HDL in the last 72 hours. Invalid input(s): LDLCALCU  ABGs:   Lab Results   Component Value Date    PHART 7.401 09/17/2021    PO2ART 72.7 09/17/2021    NSM3MQS 50.6 09/17/2021     INR: No results for input(s): INR in the last 72 hours. -----------------------------------------------------------------      Assessment and Plan   1. Acute Renal Failure   W/ ho CKD. Pt non complain    2. Acid/Base electrolyte abnormalities    3. Anemia    4. Covid-19 infection    Patient Active Problem List   Diagnosis Code    [facility] N17.9       Plan   - continue  CRRT    dose adjusted   Change post bag to 4/2.5   Fluid removal: 25-50 ml/hr negative   - Monitor labs per CRRT protocol  - >300 proteins in UA. Will check urine protein, urine creatinine  -  Kappa/Lambda ratio 2.15  - COVID -19 treatment per primary team    Seen on CRRT twice      Dose meds to GFR 25-30 on CRRT  Maintain SBP> 90 mmHg   Daily weights   AVOID NSAIDs  Avoid Nephrotoxins  Monitor Intake/Output  Call if significant decrease in urine output         Thank you for allowing us to participate in care of Mary Funes MD  9/18/2021    [facility] [address] [place] [address] [address]  Office : [phone]  Fax :[phone]

## 2021-09-18 NOTE — PROGRESS NOTES
Patient has been admitted to the ICU; patient is Covid positive. To minimize the exposure, and preserve the PPE patient was not seen physically. We will defer the management to intensive care unit team.  Once the patient is Covid negative/out of the ICU, hospitalists will assume care. Please call with questions.     Jaciel Rose MD

## 2021-09-18 NOTE — PROGRESS NOTES
ICU Progress Note    Admit Date: 9/15/2021  Day: 2  Vent Day: 2  IV Access:Peripheral  IV Fluids:None  Vasopressors: levo and vaso                Antibiotics: vanc and merrem   Diet: Diet NPO    CC: SOB and chills     Interval history: NAONE. Patient on CRRT. Decreased urine output. Hgb drop to 7.2 this am. Sedated and intubated. HPI:  Edvin Hernandez is H 89 y.o. female with pmhx of CKD, current smoker, who presented with 3 days of SOB and increased work of breathing.     In the ED, patient tachypnic with accessory muscle use and hypoxic, SBP in 200s. Placed on Bipap per RT. Patient had lab work that was consistent with acute renal failure. Nephorology was consulted for CRRT. Patient was also severely acidotic and started on Bicarb gtt after one amp push. Broad spectrum abx were started in ED. Nitro gtt started.    Denied any sick contacts, not COVID vaccinated. Has not had any chest pain associated. No orthopnea, PND, swelling. Patient stated that she has not had issues with breathing in past. Does not regularly follow with any physicians.     Medications:     Scheduled Meds:   insulin lispro  0-6 Units SubCUTAneous Q6H    dexamethasone  20 mg IntraVENous Q24H    Followed by   Yesenia Macdonald ON 9/22/2021] dexamethasone  10 mg IntraVENous Q24H    famotidine (PEPCID) injection  20 mg IntraVENous Daily    piperacillin-tazobactam  3,375 mg IntraVENous Q8H    sodium chloride flush  5-40 mL IntraVENous 2 times per day    heparin (porcine)  5,000 Units SubCUTAneous 3 times per day     Continuous Infusions:   prismaSATE BGK 4/2.5 200 mL/hr at 09/18/21 1314    prismaSATE BGK 4/2.5 400 mL/hr at 09/18/21 1058    prismaSATE BGK 4/2.5 1,000 mL/hr at 09/18/21 1314    dextrose      propofol 30 mcg/kg/min (09/18/21 0928)    fentaNYL 2000 mcg/200 mL IV infusion 12.5 mcg/hr (09/17/21 1516)    norepinephrine 6 mcg/min (09/18/21 1425)    vasopressin (Septic Shock) infusion Stopped (09/17/21 1240)    sodium chloride PRN Meds:sodium phosphate IVPB **OR** sodium phosphate IVPB **OR** sodium phosphate IVPB **OR** sodium phosphate IVPB, calcium gluconate **OR** calcium gluconate **OR** calcium gluconate **OR** calcium gluconate, glucose, dextrose, glucagon (rDNA), dextrose, sodium chloride flush, sodium chloride, ondansetron **OR** ondansetron, polyethylene glycol, acetaminophen **OR** acetaminophen    Objective:   Vitals:   T-max:  Patient Vitals for the past 8 hrs:   BP Temp Temp src Pulse Resp SpO2   09/18/21 1200 106/78 98.4 °F (36.9 °C) Axillary 87 27 97 %   09/18/21 1137 -- -- -- 102 -- 95 %   09/18/21 1130 -- -- -- 87 (!) 33 95 %   09/18/21 1115 -- -- -- 88 30 93 %   09/18/21 1100 107/72 -- -- 87 16 94 %   09/18/21 1045 -- -- -- 86 17 90 %   09/18/21 1030 -- -- -- 83 27 91 %   09/18/21 1015 -- -- -- 76 16 94 %   09/18/21 1000 129/83 -- -- 75 16 96 %   09/18/21 0946 -- -- -- 72 16 96 %   09/18/21 0945 -- -- -- 72 16 95 %   09/18/21 0944 -- -- -- 72 16 95 %   09/18/21 0943 -- -- -- 71 16 95 %   09/18/21 0942 -- -- -- 72 16 95 %   09/18/21 0941 -- -- -- 71 16 95 %   09/18/21 0940 -- -- -- 72 16 96 %   09/18/21 0939 -- -- -- 68 16 95 %   09/18/21 0938 -- -- -- 73 16 96 %   09/18/21 0937 -- -- -- 74 16 95 %   09/18/21 0936 -- -- -- 69 16 96 %   09/18/21 0935 -- -- -- 71 16 95 %   09/18/21 0930 -- -- -- 70 16 96 %   09/18/21 0915 -- -- -- 69 16 97 %   09/18/21 0900 133/88 -- -- 74 24 100 %   09/18/21 0857 -- -- -- 69 -- 100 %   09/18/21 0845 -- -- -- 69 24 100 %   09/18/21 0830 -- -- -- 62 24 100 %   09/18/21 0815 -- -- -- 59 24 100 %   09/18/21 0800 105/70 98.3 °F (36.8 °C) Axillary 55 24 100 %   09/18/21 0745 -- -- -- 61 24 100 %   09/18/21 0730 -- -- -- 69 24 100 %   09/18/21 0715 -- -- -- 58 24 100 %   09/18/21 0700 101/66 -- -- 64 24 100 %   09/18/21 0645 -- -- -- 64 24 100 %       Intake/Output Summary (Last 24 hours) at 9/18/2021 1437  Last data filed at 9/18/2021 1400  Gross per 24 hour   Intake 2193 ml   Output 430 ml 09/17/21  2145   AST 39*   < > 37 35 28   ALT 19   < > 18 18 15   BILITOT <0.2   < > <0.2 <0.2 <0.2   BILIDIR <0.2  --   --   --   --    PROT 7.4   < > 6.6 6.9 5.9*   LABALBU 2.6*   < > 2.1* 2.4* 2.3*   ALKPHOS 116   < > 94 91 70    < > = values in this interval not displayed. Troponin:   Recent Labs     09/15/21  2038 09/16/21  0958   TROPONINI 0.14* 0.10*     BNP: No results for input(s): BNP in the last 72 hours. Lipids: No results for input(s): CHOL, HDL in the last 72 hours. Invalid input(s): LDLCALCU, TRIGLYCERIDE  ABGs:    Recent Labs     09/17/21  0844 09/17/21  1809 09/18/21  1023   PHART 7.446 7.401 7.477*   FME3CRO 45.5* 50.6* 47.8*   PO2ART 69.9* 72.7* 65.7*   CTX3AOZ 31.3* 31.4* 35.3*   BEART 7* 7* 12*   C2SIRKSO 94 94 94   IGJ3SZH 33 33 37       INR: No results for input(s): INR in the last 72 hours. Lactate:   Recent Labs     09/16/21  0402 09/16/21  1805   LACTATE 1.70 1.75     Cultures:  -----------------------------------------------------------------  RAD:   XR CHEST PORTABLE   Final Result   Worsening bilateral pulmonary infiltrates. Placement of endotracheal    tube as above. XR CHEST PORTABLE   Final Result   1. Right IJ line projects over the mid SVC. No pneumothorax. 2.  Unchanged multifocal airspace disease. XR CHEST PORTABLE   Final Result    Patchy bilateral airspace opacities greatest in the left lung base suggestive of multifocal pneumonia. Assessment/Plan:   Julito Lopez is a 73 y.o. female, who presented with 3 days SOB, hypoxic in ED with acute renal failure.     Acute hypoxic respiratory failure 2/2 multifocal pneumonia, COVID-19  On BIPAP in ED. Cultures pending.  COVID positive  - sedated and intubated day 1   - sedation- propofol 30 mcg/kg/min and fentanyl- 25mcg/hr  - Vent Settings: Vent Mode: AC/PRVC Rate Set: 24 bmp/Vt Ordered: 450 mL/ /FiO2 : 45 %/ PEEP 5   - continue Zosyn  - continue decadron 20 mg I/V for 5 days followed by 10 mg I/V daily   - will follow up ABG  - received dose of toci on 9/16    Hypocalcemia   Ionized calcium low 0.96. On CRRT    Anemia  Hgb dropped to 7.2.  - Monitor    UTI  WBC: Recent 21.4, down from 30.3  -urine culture positive for E coli, continue abx   -Blood cx NGTD  -consider plan CT abdomen to rule out any intra abdominal pathology      Hypotension  Likely 2/2 sepsis and intubation, sedation. On levo 6. BP stable  - Continue to monitor BP     Acute renal failure  History of CKD  - Nephrology folllowing  - on CRRT   - will follow renal panel closely  - daily weights, I/Os     AG metabolic acidosis, improved  Likely secondary to renal failure. S/p Got bicarb in ED  - CRRT      NSTEMI likely type 2  Likely 2/2 demand mismatch in setting of infection, renal failure  - Trops downtrended     Hyperglycemia on decardon  - hypoglycemia protocol  - LDSSI  - POCT glucose checks       Code Status: Full code   FEN: NPO  PPX: heparin  DISPO: ICU    Ashlee Roa MD, PGY-1  09/18/21  2:37 PM    This patient has been staffed and discussed with Dr Ceci Gan. Patient seen, examined and discussed with the resident and I agree with the assessment and plan.     Vent Mode: AC/PRVC Rate Set: 24 bmp/Vt Ordered: 450 mL/ /FiO2 : 45 %  PEEP 5       Recent Labs     09/17/21  0844 09/17/21  1809   PHART 7.446 7.401   EGI3INH 45.5* 50.6*   PO2ART 69.9* 72.7*      Patient was volume responsive yesterday and received 3 L of IV fluids between resuscitation and drip sedation. She remains intubated and sedated but her pressor dose has been downtrending. She remains critically ill on life support. Blood gases well compensated this morning on the above settings. Patient with a fair amount of air trapping on the current rate of 24 with intrinsic PEEP of 4. Dropped her rate down to 18 which improved that did not significantly change her end-tidal CO2. I further dropped her to 16 and asked for a blood gas in 30 minutes.   If she remains well compensated I think we should try her on an SBT. She is Covid positive but I think her admission fits better with a septic shock picture from bacterial infection. Hopefully she will worsen from Covid down the line.   We are treating her for Covid     Critical care time spent reviewing labs/films, examining patient, collaborating with other physicians but excluding procedures for life threatening organ failure is 35 minutes.        Patricia Joaquin MD

## 2021-09-18 NOTE — PLAN OF CARE
Problem: Falls - Risk of:  Goal: Will remain free from falls  Description: Will remain free from falls  9/17/2021 2337 by Carlin Paulino RN  Outcome: Ongoing     Problem: Falls - Risk of:  Goal: Absence of physical injury  Description: Absence of physical injury  9/17/2021 2337 by Carlin Paulino RN  Outcome: Ongoing     Problem: Non-Violent Restraints  Goal: Removal from restraints as soon as assessed to be safe  9/17/2021 2337 by Carlin Paulino RN  Outcome: Ongoing     Problem: Non-Violent Restraints  Goal: No harm/injury to patient while restraints in use  9/17/2021 2337 by Carlin Paulino RN  Outcome: Ongoing     Problem: Non-Violent Restraints  Goal: Patient's dignity will be maintained  9/17/2021 2337 by Carlin Paulino RN  Outcome: Ongoing     Problem: Gas Exchange - Impaired  Goal: Promote optimal lung function  9/17/2021 2337 by Carlin Paulino RN  Outcome: Ongoing     Problem:  Body Temperature -  Risk of, Imbalanced  Goal: Ability to maintain a body temperature within defined limits  9/17/2021 2337 by Carlin Paulino RN  Outcome: Ongoing

## 2021-09-18 NOTE — PLAN OF CARE
Problem: Falls - Risk of:  Goal: Will remain free from falls  Description: Will remain free from falls  9/18/2021 1129 by Darlyn Saucedo RN  Outcome: Ongoing     Problem: Falls - Risk of:  Goal: Absence of physical injury  Description: Absence of physical injury  9/18/2021 1129 by Darlyn Saucedo RN  Outcome: Ongoing     Problem: Non-Violent Restraints  Goal: Removal from restraints as soon as assessed to be safe  9/18/2021 1129 by Darlyn Saucedo RN  Outcome: Ongoing     Problem: Non-Violent Restraints  Goal: No harm/injury to patient while restraints in use  9/18/2021 1129 by Darlyn Saucedo RN  Outcome: Ongoing     Problem: Non-Violent Restraints  Goal: Patient's dignity will be maintained  9/18/2021 1129 by Darlyn Saucedo RN  Outcome: Ongoing     Problem: Airway Clearance - Ineffective  Goal: Achieve or maintain patent airway  Outcome: Ongoing     Problem: Gas Exchange - Impaired  Goal: Absence of hypoxia  Outcome: Ongoing     Problem: Gas Exchange - Impaired  Goal: Promote optimal lung function  9/18/2021 1129 by Darlyn Saucedo RN  Outcome: Ongoing     Problem: Breathing Pattern - Ineffective  Goal: Ability to achieve and maintain a regular respiratory rate  Outcome: Ongoing     Problem: Isolation Precautions - Risk of Spread of Infection  Goal: Prevent transmission of infection  Outcome: Ongoing     Problem: Nutrition Deficits  Goal: Optimize nutritional status  Outcome: Ongoing     Problem: Risk for Fluid Volume Deficit  Goal: Maintain normal heart rhythm  Outcome: Ongoing     Problem: Risk for Fluid Volume Deficit  Goal: Maintain absence of muscle cramping  Outcome: Ongoing     Problem: Risk for Fluid Volume Deficit  Goal: Maintain normal serum potassium, sodium, calcium, phosphorus, and pH  Outcome: Ongoing     Problem: Loneliness or Risk for Loneliness  Goal: Demonstrate positive use of time alone when socialization is not possible  Outcome: Ongoing     Problem: Fatigue  Goal: Verbalize increase energy and improved vitality  Outcome: Ongoing     Problem: Patient Education: Go to Patient Education Activity  Goal: Patient/Family Education  Outcome: Ongoing     Problem: Skin Integrity:  Goal: Will show no infection signs and symptoms  Description: Will show no infection signs and symptoms  Outcome: Ongoing     Problem: Skin Integrity:  Goal: Absence of new skin breakdown  Description: Absence of new skin breakdown  Outcome: Ongoing     Problem: Nutrition  Goal: Optimal nutrition therapy  Outcome: Ongoing

## 2021-09-18 NOTE — PROGRESS NOTES
Pt seen on CRRT twice   Stop UF as BP low   Wean pressors   Labs reviewed     COVID management   Vent management   UO poor       Zee Cantu MD

## 2021-09-19 LAB
A/G RATIO: 0.7 (ref 1.1–2.2)
A/G RATIO: 0.7 (ref 1.1–2.2)
ALBUMIN SERPL-MCNC: 2.6 G/DL (ref 3.4–5)
ALBUMIN SERPL-MCNC: 2.7 G/DL (ref 3.4–5)
ALP BLD-CCNC: 69 U/L (ref 40–129)
ALP BLD-CCNC: 80 U/L (ref 40–129)
ALT SERPL-CCNC: 13 U/L (ref 10–40)
ALT SERPL-CCNC: 14 U/L (ref 10–40)
ANION GAP SERPL CALCULATED.3IONS-SCNC: 10 MMOL/L (ref 3–16)
ANION GAP SERPL CALCULATED.3IONS-SCNC: 11 MMOL/L (ref 3–16)
AST SERPL-CCNC: 21 U/L (ref 15–37)
AST SERPL-CCNC: 21 U/L (ref 15–37)
BASOPHILS ABSOLUTE: 0 K/UL (ref 0–0.2)
BASOPHILS RELATIVE PERCENT: 0.2 %
BILIRUB SERPL-MCNC: 0.4 MG/DL (ref 0–1)
BILIRUB SERPL-MCNC: <0.2 MG/DL (ref 0–1)
BLOOD BANK DISPENSE STATUS: NORMAL
BLOOD BANK DISPENSE STATUS: NORMAL
BLOOD BANK PRODUCT CODE: NORMAL
BLOOD BANK PRODUCT CODE: NORMAL
BLOOD CULTURE, ROUTINE: NORMAL
BPU ID: NORMAL
BPU ID: NORMAL
BUN BLDV-MCNC: 26 MG/DL (ref 7–20)
BUN BLDV-MCNC: 26 MG/DL (ref 7–20)
CALCIUM IONIZED: 1.1 MMOL/L (ref 1.12–1.32)
CALCIUM IONIZED: 1.1 MMOL/L (ref 1.12–1.32)
CALCIUM IONIZED: 1.12 MMOL/L (ref 1.12–1.32)
CALCIUM SERPL-MCNC: 8.2 MG/DL (ref 8.3–10.6)
CALCIUM SERPL-MCNC: 8.4 MG/DL (ref 8.3–10.6)
CHLORIDE BLD-SCNC: 97 MMOL/L (ref 99–110)
CHLORIDE BLD-SCNC: 97 MMOL/L (ref 99–110)
CO2: 26 MMOL/L (ref 21–32)
CO2: 28 MMOL/L (ref 21–32)
CREAT SERPL-MCNC: 2.1 MG/DL (ref 0.6–1.2)
CREAT SERPL-MCNC: 2.2 MG/DL (ref 0.6–1.2)
CULTURE, BLOOD 2: NORMAL
DESCRIPTION BLOOD BANK: NORMAL
DESCRIPTION BLOOD BANK: NORMAL
EOSINOPHILS ABSOLUTE: 0 K/UL (ref 0–0.6)
EOSINOPHILS RELATIVE PERCENT: 0 %
GFR AFRICAN AMERICAN: 27
GFR AFRICAN AMERICAN: 28
GFR NON-AFRICAN AMERICAN: 22
GFR NON-AFRICAN AMERICAN: 23
GLOBULIN: 3.7 G/DL
GLOBULIN: 3.7 G/DL
GLUCOSE BLD-MCNC: 130 MG/DL (ref 70–99)
GLUCOSE BLD-MCNC: 131 MG/DL (ref 70–99)
GLUCOSE BLD-MCNC: 133 MG/DL (ref 70–99)
GLUCOSE BLD-MCNC: 145 MG/DL (ref 70–99)
GLUCOSE BLD-MCNC: 154 MG/DL (ref 70–99)
GLUCOSE BLD-MCNC: 154 MG/DL (ref 70–99)
HCT VFR BLD CALC: 21.9 % (ref 36–48)
HEMOGLOBIN: 7.1 G/DL (ref 12–16)
LYMPHOCYTES ABSOLUTE: 0.2 K/UL (ref 1–5.1)
LYMPHOCYTES RELATIVE PERCENT: 1.3 %
MAGNESIUM: 2.6 MG/DL (ref 1.8–2.4)
MCH RBC QN AUTO: 30.6 PG (ref 26–34)
MCHC RBC AUTO-ENTMCNC: 32.3 G/DL (ref 31–36)
MCV RBC AUTO: 94.9 FL (ref 80–100)
MONOCYTES ABSOLUTE: 0.5 K/UL (ref 0–1.3)
MONOCYTES RELATIVE PERCENT: 3 %
NEUTROPHILS ABSOLUTE: 17 K/UL (ref 1.7–7.7)
NEUTROPHILS RELATIVE PERCENT: 95.5 %
PDW BLD-RTO: 15.9 % (ref 12.4–15.4)
PERFORMED ON: ABNORMAL
PH VENOUS: 7.41 (ref 7.35–7.45)
PH VENOUS: 7.42 (ref 7.35–7.45)
PH VENOUS: 7.44 (ref 7.35–7.45)
PHOSPHORUS: 3 MG/DL (ref 2.5–4.9)
PHOSPHORUS: 3.1 MG/DL (ref 2.5–4.9)
PLATELET # BLD: 167 K/UL (ref 135–450)
PMV BLD AUTO: 7.9 FL (ref 5–10.5)
POTASSIUM SERPL-SCNC: 4.5 MMOL/L (ref 3.5–5.1)
POTASSIUM SERPL-SCNC: 4.9 MMOL/L (ref 3.5–5.1)
RBC # BLD: 2.31 M/UL (ref 4–5.2)
SODIUM BLD-SCNC: 133 MMOL/L (ref 136–145)
SODIUM BLD-SCNC: 136 MMOL/L (ref 136–145)
TOTAL PROTEIN: 6.3 G/DL (ref 6.4–8.2)
TOTAL PROTEIN: 6.4 G/DL (ref 6.4–8.2)
WBC # BLD: 17.8 K/UL (ref 4–11)

## 2021-09-19 PROCEDURE — 90945 DIALYSIS ONE EVALUATION: CPT

## 2021-09-19 PROCEDURE — 51798 US URINE CAPACITY MEASURE: CPT

## 2021-09-19 PROCEDURE — 94003 VENT MGMT INPAT SUBQ DAY: CPT

## 2021-09-19 PROCEDURE — 99291 CRITICAL CARE FIRST HOUR: CPT | Performed by: INTERNAL MEDICINE

## 2021-09-19 PROCEDURE — 90945 DIALYSIS ONE EVALUATION: CPT | Performed by: HOSPITALIST

## 2021-09-19 PROCEDURE — 2580000003 HC RX 258: Performed by: STUDENT IN AN ORGANIZED HEALTH CARE EDUCATION/TRAINING PROGRAM

## 2021-09-19 PROCEDURE — 37799 UNLISTED PX VASCULAR SURGERY: CPT

## 2021-09-19 PROCEDURE — 6360000002 HC RX W HCPCS: Performed by: SURGERY

## 2021-09-19 PROCEDURE — 6360000002 HC RX W HCPCS: Performed by: HOSPITALIST

## 2021-09-19 PROCEDURE — 36620 INSERTION CATHETER ARTERY: CPT | Performed by: INTERNAL MEDICINE

## 2021-09-19 PROCEDURE — 6360000002 HC RX W HCPCS: Performed by: STUDENT IN AN ORGANIZED HEALTH CARE EDUCATION/TRAINING PROGRAM

## 2021-09-19 PROCEDURE — 2580000003 HC RX 258: Performed by: HOSPITALIST

## 2021-09-19 PROCEDURE — 36415 COLL VENOUS BLD VENIPUNCTURE: CPT

## 2021-09-19 PROCEDURE — 2500000003 HC RX 250 WO HCPCS

## 2021-09-19 PROCEDURE — 2580000003 HC RX 258: Performed by: SURGERY

## 2021-09-19 PROCEDURE — 83735 ASSAY OF MAGNESIUM: CPT

## 2021-09-19 PROCEDURE — 2500000003 HC RX 250 WO HCPCS: Performed by: STUDENT IN AN ORGANIZED HEALTH CARE EDUCATION/TRAINING PROGRAM

## 2021-09-19 PROCEDURE — 94761 N-INVAS EAR/PLS OXIMETRY MLT: CPT

## 2021-09-19 PROCEDURE — 2500000003 HC RX 250 WO HCPCS: Performed by: SURGERY

## 2021-09-19 PROCEDURE — 85025 COMPLETE CBC W/AUTO DIFF WBC: CPT

## 2021-09-19 PROCEDURE — 36620 INSERTION CATHETER ARTERY: CPT

## 2021-09-19 PROCEDURE — 2000000000 HC ICU R&B

## 2021-09-19 PROCEDURE — 2700000000 HC OXYGEN THERAPY PER DAY

## 2021-09-19 PROCEDURE — 84100 ASSAY OF PHOSPHORUS: CPT

## 2021-09-19 PROCEDURE — 82330 ASSAY OF CALCIUM: CPT

## 2021-09-19 PROCEDURE — 80053 COMPREHEN METABOLIC PANEL: CPT

## 2021-09-19 RX ADMIN — Medication 10 ML: at 23:17

## 2021-09-19 RX ADMIN — CALCIUM GLUCONATE 1000 MG: 98 INJECTION, SOLUTION INTRAVENOUS at 12:23

## 2021-09-19 RX ADMIN — Medication: at 15:07

## 2021-09-19 RX ADMIN — INSULIN LISPRO 1 UNITS: 100 INJECTION, SOLUTION INTRAVENOUS; SUBCUTANEOUS at 15:19

## 2021-09-19 RX ADMIN — Medication: at 09:07

## 2021-09-19 RX ADMIN — PIPERACILLIN AND TAZOBACTAM 3375 MG: 3; .375 INJECTION, POWDER, LYOPHILIZED, FOR SOLUTION INTRAVENOUS at 08:58

## 2021-09-19 RX ADMIN — CALCIUM GLUCONATE 1000 MG: 98 INJECTION, SOLUTION INTRAVENOUS at 18:53

## 2021-09-19 RX ADMIN — INSULIN LISPRO 1 UNITS: 100 INJECTION, SOLUTION INTRAVENOUS; SUBCUTANEOUS at 21:39

## 2021-09-19 RX ADMIN — Medication 10 ML: at 09:21

## 2021-09-19 RX ADMIN — PROPOFOL 30 MCG/KG/MIN: 10 INJECTION, EMULSION INTRAVENOUS at 06:10

## 2021-09-19 RX ADMIN — HEPARIN SODIUM 5000 UNITS: 5000 INJECTION INTRAVENOUS; SUBCUTANEOUS at 06:29

## 2021-09-19 RX ADMIN — CALCIUM GLUCONATE 1000 MG: 98 INJECTION, SOLUTION INTRAVENOUS at 05:51

## 2021-09-19 RX ADMIN — PIPERACILLIN AND TAZOBACTAM 3375 MG: 3; .375 INJECTION, POWDER, LYOPHILIZED, FOR SOLUTION INTRAVENOUS at 16:06

## 2021-09-19 RX ADMIN — DEXAMETHASONE SODIUM PHOSPHATE 20 MG: 4 INJECTION, SOLUTION INTRAMUSCULAR; INTRAVENOUS at 09:09

## 2021-09-19 RX ADMIN — SODIUM CHLORIDE 0.6 MCG/KG/HR: 9 INJECTION, SOLUTION INTRAVENOUS at 23:07

## 2021-09-19 RX ADMIN — Medication: at 22:03

## 2021-09-19 RX ADMIN — Medication: at 14:15

## 2021-09-19 RX ADMIN — Medication: at 19:24

## 2021-09-19 RX ADMIN — HEPARIN SODIUM 5000 UNITS: 5000 INJECTION INTRAVENOUS; SUBCUTANEOUS at 22:24

## 2021-09-19 RX ADMIN — FAMOTIDINE 20 MG: 10 INJECTION, SOLUTION INTRAVENOUS at 09:21

## 2021-09-19 RX ADMIN — SODIUM CHLORIDE 0.2 MCG/KG/HR: 9 INJECTION, SOLUTION INTRAVENOUS at 12:29

## 2021-09-19 RX ADMIN — DEXTROSE MONOHYDRATE 12.5 MCG/HR: 5 INJECTION, SOLUTION INTRAVENOUS at 04:05

## 2021-09-19 RX ADMIN — HEPARIN SODIUM 5000 UNITS: 5000 INJECTION INTRAVENOUS; SUBCUTANEOUS at 14:14

## 2021-09-19 ASSESSMENT — PULMONARY FUNCTION TESTS
PIF_VALUE: 23
PIF_VALUE: 18
PIF_VALUE: 24
PIF_VALUE: 22
PIF_VALUE: 21
PIF_VALUE: 21
PIF_VALUE: 22
PIF_VALUE: 25
PIF_VALUE: 23
PIF_VALUE: 21
PIF_VALUE: 18
PIF_VALUE: 22
PIF_VALUE: 20
PIF_VALUE: 22
PIF_VALUE: 21
PIF_VALUE: 22
PIF_VALUE: 21
PIF_VALUE: 23
PIF_VALUE: 24
PIF_VALUE: 31
PIF_VALUE: 23
PIF_VALUE: 22
PIF_VALUE: 20
PIF_VALUE: 21
PIF_VALUE: 25

## 2021-09-19 ASSESSMENT — PAIN SCALES - GENERAL
PAINLEVEL_OUTOF10: 0

## 2021-09-19 NOTE — PROGRESS NOTES
Pt's arterial line becoming positional, unable to draw back blood, and unable to flush line. ICU residents aware and at bedside to assess/ replace line.

## 2021-09-19 NOTE — PROGRESS NOTES
Multiple attempts by ICU residents to rewire arterial line without success. ICU residents attempted to place new line on left radial, also without success. Will allow day team ICU residents to assess and place line. SBP reading  and MAP reading in the 80s per cuff. Levo on and off overnight, titrating per MAP of 65.

## 2021-09-19 NOTE — PLAN OF CARE
Problem: Falls - Risk of:  Goal: Will remain free from falls  Description: Will remain free from falls  Outcome: Ongoing     Problem: Falls - Risk of:  Goal: Absence of physical injury  Description: Absence of physical injury  Outcome: Ongoing     Problem: Non-Violent Restraints  Goal: Removal from restraints as soon as assessed to be safe  9/19/2021 1357 by Lynn Lopez RN  Outcome: Ongoing     Problem: Non-Violent Restraints  Goal: No harm/injury to patient while restraints in use  9/19/2021 1357 by Lynn Lopez RN  Outcome: Ongoing     Problem: Non-Violent Restraints  Goal: Patient's dignity will be maintained  9/19/2021 1357 by Lynn Lopez RN  Outcome: Ongoing     Problem: Airway Clearance - Ineffective  Goal: Achieve or maintain patent airway  9/19/2021 1357 by Lynn Lopez RN  Outcome: Ongoing     Problem: Gas Exchange - Impaired  Goal: Absence of hypoxia  9/19/2021 1357 by Lynn Lopez RN  Outcome: Ongoing     Problem: Gas Exchange - Impaired  Goal: Promote optimal lung function  Outcome: Ongoing     Problem: Breathing Pattern - Ineffective  Goal: Ability to achieve and maintain a regular respiratory rate  Outcome: Ongoing     Problem: Body Temperature -  Risk of, Imbalanced  Goal: Ability to maintain a body temperature within defined limits  Outcome: Ongoing     Problem: Body Temperature -  Risk of, Imbalanced  Goal: Will regain or maintain usual level of consciousness  Outcome: Ongoing     Problem:  Body Temperature -  Risk of, Imbalanced  Goal: Complications related to the disease process, condition or treatment will be avoided or minimized  Outcome: Ongoing     Problem: Isolation Precautions - Risk of Spread of Infection  Goal: Prevent transmission of infection  Outcome: Ongoing     Problem: Nutrition Deficits  Goal: Optimize nutritional status  Outcome: Ongoing     Problem: Risk for Fluid Volume Deficit  Goal: Maintain normal heart rhythm  Outcome: Ongoing     Problem: Risk for Fluid Volume Deficit  Goal: Maintain absence of muscle cramping  Outcome: Ongoing     Problem: Risk for Fluid Volume Deficit  Goal: Maintain normal serum potassium, sodium, calcium, phosphorus, and pH  Outcome: Ongoing     Problem: Fatigue  Goal: Verbalize increase energy and improved vitality  Outcome: Ongoing     Problem: Patient Education: Go to Patient Education Activity  Goal: Patient/Family Education  Outcome: Ongoing     Problem: Skin Integrity:  Goal: Will show no infection signs and symptoms  Description: Will show no infection signs and symptoms  Outcome: Ongoing     Problem: Skin Integrity:  Goal: Absence of new skin breakdown  Description: Absence of new skin breakdown  Outcome: Ongoing     Problem: Nutrition  Goal: Optimal nutrition therapy  Outcome: Ongoing

## 2021-09-19 NOTE — PROCEDURES
Caty Anderson is a 73 y.o. female patient. 1. Pneumonia of left lower lobe due to infectious organism    2. Suspected COVID-19 virus infection    3. Acute respiratory failure with hypoxia (HCC)    4. Acute renal failure, unspecified acute renal failure type ([address]    5. Metabolic acidosis      History reviewed. No pertinent past medical history. Blood pressure 92/67, pulse 67, temperature 97.6 °F (36.4 °C), temperature source Axillary, resp. rate 16, height 5' 4\" (1.626 m), weight 137 lb 2 oz (62.2 kg), SpO2 98 %. Insert Arterial Line    Date/Time: 9/19/2021 8:58 AM  Performed by: Breann Arenas MD  Authorized by: Breann Arenas MD   Consent: Written consent obtained. Consent given by: power of   Required items: required blood products, implants, devices, and special equipment available  Patient identity confirmed: verbally with patient and provided demographic data  Indications: multiple ABGs, respiratory failure and hemodynamic monitoring  Location: right femoral    Sedation:  Patient sedated: yes  Sedatives: propofol    Needle gauge: 20  Seldinger technique: Seldinger technique used  Number of attempts: 1  Post-procedure: line sutured and dressing applied  Post-procedure CMS: normal  Patient tolerance: patient tolerated the procedure well with no immediate complications  Comments: EBL 5 cc          Breann Arenas MD  9/19/2021    I was present, available and supervised the placement of this arterial catheter.     EBL:  minimal     Olena Coelho MD

## 2021-09-19 NOTE — PLAN OF CARE
Problem: Non-Violent Restraints  Goal: Removal from restraints as soon as assessed to be safe  9/19/2021 0107 by Kareem Narvaez RN  Outcome: Ongoing     Problem: Non-Violent Restraints  Goal: No harm/injury to patient while restraints in use  9/19/2021 0107 by Kareem Narvaez RN  Outcome: Ongoing     Problem: Non-Violent Restraints  Goal: Patient's dignity will be maintained  9/19/2021 0107 by Kareem Narvaez RN  Outcome: Ongoing     Problem: Gas Exchange - Impaired  Goal: Absence of hypoxia  9/19/2021 0107 by Kareem Narvaez RN  Outcome: Ongoing

## 2021-09-19 NOTE — PROGRESS NOTES
Patient has been admitted to the ICU; patient is Covid positive. To minimize the exposure, and preserve the PPE patient was not seen physically. We will defer the management to intensive care unit team.  Once the patient is Covid negative/out of the ICU, hospitalists will assume care.   Please call with questions.     Alon Ray MD

## 2021-09-19 NOTE — PROGRESS NOTES
ICU Progress Note    Admit Date: 9/15/2021             Antibiotics: Zosyn  Diet: Diet NPO    CC: SOB and chills     Interval history:   IVETH  Continues to be on CRRT  Off all pressors  Vent requirement stable, sedation req stable  Afebrile overnight    Medications:     Scheduled Meds:   insulin lispro  0-6 Units SubCUTAneous Q6H    dexamethasone  20 mg IntraVENous Q24H    Followed by   Skyler Collado ON 9/22/2021] dexamethasone  10 mg IntraVENous Q24H    famotidine (PEPCID) injection  20 mg IntraVENous Daily    piperacillin-tazobactam  3,375 mg IntraVENous Q8H    sodium chloride flush  5-40 mL IntraVENous 2 times per day    heparin (porcine)  5,000 Units SubCUTAneous 3 times per day     Continuous Infusions:   prismaSATE BGK 4/2.5 200 mL/hr at 09/18/21 1314    prismaSATE BGK 4/2.5 400 mL/hr at 09/18/21 2010    prismaSATE BGK 4/2.5 1,000 mL/hr at 09/18/21 1802    dextrose      propofol 30 mcg/kg/min (09/19/21 0610)    fentaNYL 2000 mcg/200 mL IV infusion 12.5 mcg/hr (09/19/21 0405)    norepinephrine Stopped (09/19/21 0618)    vasopressin (Septic Shock) infusion Stopped (09/17/21 1240)    sodium chloride       PRN Meds:sodium phosphate IVPB **OR** sodium phosphate IVPB **OR** sodium phosphate IVPB **OR** sodium phosphate IVPB, calcium gluconate **OR** calcium gluconate **OR** calcium gluconate **OR** calcium gluconate, glucose, dextrose, glucagon (rDNA), dextrose, sodium chloride flush, sodium chloride, ondansetron **OR** ondansetron, polyethylene glycol, acetaminophen **OR** acetaminophen    Objective:   Vitals:   T-max:  Patient Vitals for the past 8 hrs:   BP Temp Temp src Pulse Resp SpO2   09/19/21 0655 105/75 -- -- 71 -- --   09/19/21 0650 109/78 -- -- 71 -- --   09/19/21 0645 113/86 -- -- 74 -- --   09/19/21 0640 115/82 -- -- 73 -- --   09/19/21 0635 -- -- -- 75 16 --   09/19/21 0630 124/82 -- -- 74 16 --   09/19/21 0625 (!) 133/90 -- -- 75 16 --   09/19/21 0620 (!) 130/93 -- -- 76 16 --   09/19/21 0615 (!) 138/97 -- -- 79 18 --   09/19/21 0610 (!) 131/91 -- -- 75 17 --   09/19/21 0605 (!) 126/90 -- -- 74 16 --   09/19/21 0600 123/87 -- -- 74 24 --   09/19/21 0555 116/80 -- -- 68 16 --   09/19/21 0550 116/85 -- -- 69 16 --   09/19/21 0545 116/85 -- -- 72 16 --   09/19/21 0540 113/84 -- -- 72 17 --   09/19/21 0535 108/78 -- -- 69 16 --   09/19/21 0530 98/71 -- -- 69 16 --   09/19/21 0525 96/70 -- -- 73 16 --   09/19/21 0520 92/69 -- -- 74 16 --   09/19/21 0515 84/66 -- -- 72 16 --   09/19/21 0510 84/63 -- -- 72 16 --   09/19/21 0505 85/65 -- -- 73 16 --   09/19/21 0500 89/67 -- -- 73 16 --   09/19/21 0445 -- -- -- 74 16 99 %   09/19/21 0430 -- -- -- 81 18 96 %   09/19/21 0415 -- -- -- 79 21 --   09/19/21 0401 -- -- -- 81 (!) 31 98 %   09/19/21 0400 110/81 98.1 °F (36.7 °C) Axillary 79 28 99 %   09/19/21 0345 -- -- -- 79 28 97 %   09/19/21 0330 -- -- -- 78 21 97 %   09/19/21 0315 -- -- -- 76 (!) 33 95 %   09/19/21 0300 111/80 -- -- 74 24 96 %   09/19/21 0245 -- -- -- 74 24 95 %   09/19/21 0230 -- -- -- 74 29 95 %   09/19/21 0215 -- -- -- 72 19 95 %   09/19/21 0200 111/81 -- -- 73 (!) 31 96 %   09/19/21 0145 -- -- -- 72 29 96 %   09/19/21 0130 -- -- -- 71 30 97 %   09/19/21 0115 -- -- -- 70 23 96 %   09/19/21 0100 108/77 -- -- 68 27 96 %   09/19/21 0045 -- -- -- 68 23 97 %   09/19/21 0030 -- -- -- 70 28 97 %   09/19/21 0015 -- -- -- 70 30 97 %   09/19/21 0001 -- -- -- 71 17 97 %   09/19/21 0000 101/74 98 °F (36.7 °C) Axillary 72 17 97 %   09/18/21 2345 -- -- -- 69 (!) 32 97 %   09/18/21 2330 -- -- -- 68 26 97 %       Intake/Output Summary (Last 24 hours) at 9/19/2021 3223  Last data filed at 9/19/2021 0700  Gross per 24 hour   Intake 1060 ml   Output 992 ml   Net 68 ml     Review of Systems   Unable to perform ROS: Intubated   All other systems reviewed and are negative. Physical Exam  Constitutional:       Appearance: She is not ill-appearing. HENT:      Head: Normocephalic and atraumatic.    Eyes: General: No scleral icterus. Cardiovascular:      Rate and Rhythm: Normal rate and regular rhythm. Pulmonary:      Effort: No respiratory distress. Breath sounds: No wheezing or rales. Comments: Crackles present  Abdominal:      General: There is no distension. Tenderness: There is no abdominal tenderness. There is no guarding. Musculoskeletal:      Right lower leg: No edema. Left lower leg: No edema. Skin:     Capillary Refill: Capillary refill takes less than 2 seconds. Coloration: Skin is not pale. Findings: No rash. Neurological:      Comments: Intubated and sedated       LABS:    CBC:   Recent Labs     09/18/21  0351 09/18/21  1616 09/19/21  0345   WBC 21.4* 19.6* 17.8*   HGB 7.2* 7.2* 7.1*   HCT 21.7* 21.6* 21.9*    174 167   MCV 93.4 93.1 94.9     Renal:    Recent Labs     09/17/21  0332 09/17/21  0959 09/18/21  0351 09/18/21  0351 09/18/21  1600 09/18/21  2150 09/19/21  0345   *   < > 132*   < > 134* 136 136   K 4.4   < > 4.1  --  4.3 4.5 4.5   CL 88*   < > 89*   < > 91* 95* 97*   CO2 29   < > 31   < > 32 29 28   BUN 39*   < > 29*   < > 26* 26* 26*   CREATININE 3.3*   < > 2.5*   < > 2.3* 2.2* 2.2*   GLUCOSE 177*   < > 164*   < > 142* 144* 131*   CALCIUM 7.9*   < > 7.6*   < > 7.8* 8.1* 8.2*   MG 2.30  --  2.20  --   --   --  2.60*   PHOS  --    < >  --   --  3.2 3.4 3.1   ANIONGAP 15   < > 12   < > 11 12 11    < > = values in this interval not displayed. Hepatic:   Recent Labs     09/18/21  1616 09/18/21  2150 09/19/21  0345   AST 24 22 21   ALT 14 13 13   BILITOT <0.2 0.3 <0.2   BILIDIR <0.2  --   --    PROT 6.1* 6.0* 6.3*   LABALBU 2.4* 2.4* 2.6*   ALKPHOS 63 62 80     Troponin:   Recent Labs     09/16/21  0958   TROPONINI 0.10*     BNP: No results for input(s): BNP in the last 72 hours. Lipids: No results for input(s): CHOL, HDL in the last 72 hours.     Invalid input(s): LDLCALCU, TRIGLYCERIDE  ABGs:    Recent Labs     09/17/21  0844 09/17/21  1800 likely type 2  Likely 2/2 demand mismatch in setting of infection, renal failure  - Trops downtrended     Hyperglycemia on decardon  - hypoglycemia protocol  - LDSSI  - POCT glucose checks     Code Status: Full code   FEN: NPO  PPX: heparin TID  DISPO: ICU    Glenn Adame MD, PGY-2  09/19/21  7:22 AM    This patient has been staffed and discussed with Dr Rudy Leon. Patient seen, examined and discussed with the resident and I agree with the assessment and plan. Vent Mode: AC/PRVC Rate Set: 16 bmp/Vt Ordered: 450 mL/ /FiO2 : 100 %  PEEP 5  Recent Labs     09/17/21  1809 09/18/21  1023   PHART 7.401 7.477*   ILW9ERG 50.6* 47.8*   PO2ART 72.7* 65.7*     Failed her SBT yesterday for low volumes. She is Covid positive but I think her admission fits better with a septic shock picture from bacterial infection. Hopefully she will worsen from Covid down the line. We are treating her for Covid. On zosyn only as she grow e.coli positive in her urine. Re trial her for extubation today. Anemia is stable the last 3 checks, but lower since admission. I'm going to drop her VT to 375ml on the vent. When I trialed her on PSV her RSBI was too high. Will give her more time off sedation and then try again. If she doesn't extubate today, will start feeds. Critical care time spent reviewing labs/films, examining patient, collaborating with other physicians but excluding procedures for life threatening organ failure is 36 minutes.       Massiel Pascual MD

## 2021-09-19 NOTE — PROGRESS NOTES
Upon initial assessment, vascath dressing was noted to be loose around insertion site and no stiches on the right side to hold vascath in place. ICU residents aware and at bedside to assess. Stitches replaced, new dressing. CRRT running, keeping even. Levo off. MAP goal of 65. Propofol and fentanyl infusing. Pt opens eyes to voice, does not follow commands.

## 2021-09-20 PROBLEM — A41.9 SEPTIC SHOCK (HCC): Status: ACTIVE | Noted: 2021-09-20

## 2021-09-20 PROBLEM — N30.00 ACUTE CYSTITIS WITHOUT HEMATURIA: Status: ACTIVE | Noted: 2021-09-20

## 2021-09-20 PROBLEM — R65.21 SEPTIC SHOCK (HCC): Status: ACTIVE | Noted: 2021-09-20

## 2021-09-20 LAB
A/G RATIO: 0.8 (ref 1.1–2.2)
A/G RATIO: 0.8 (ref 1.1–2.2)
ALBUMIN SERPL-MCNC: 2.7 G/DL (ref 3.4–5)
ALBUMIN SERPL-MCNC: 2.9 G/DL (ref 3.4–5)
ALP BLD-CCNC: 69 U/L (ref 40–129)
ALP BLD-CCNC: 77 U/L (ref 40–129)
ALT SERPL-CCNC: 17 U/L (ref 10–40)
ALT SERPL-CCNC: 23 U/L (ref 10–40)
ANION GAP SERPL CALCULATED.3IONS-SCNC: 11 MMOL/L (ref 3–16)
ANION GAP SERPL CALCULATED.3IONS-SCNC: 9 MMOL/L (ref 3–16)
ANISOCYTOSIS: ABNORMAL
AST SERPL-CCNC: 22 U/L (ref 15–37)
AST SERPL-CCNC: 31 U/L (ref 15–37)
BANDED NEUTROPHILS RELATIVE PERCENT: 1 % (ref 0–7)
BASE EXCESS ARTERIAL: 1 (ref -3–3)
BASOPHILS ABSOLUTE: 0 K/UL (ref 0–0.2)
BASOPHILS RELATIVE PERCENT: 0 %
BILIRUB SERPL-MCNC: 0.4 MG/DL (ref 0–1)
BILIRUB SERPL-MCNC: 0.4 MG/DL (ref 0–1)
BUN BLDV-MCNC: 29 MG/DL (ref 7–20)
BUN BLDV-MCNC: 29 MG/DL (ref 7–20)
CALCIUM IONIZED: 1.09 MMOL/L (ref 1.12–1.32)
CALCIUM IONIZED: 1.16 MMOL/L (ref 1.12–1.32)
CALCIUM SERPL-MCNC: 8.2 MG/DL (ref 8.3–10.6)
CALCIUM SERPL-MCNC: 8.2 MG/DL (ref 8.3–10.6)
CHLORIDE BLD-SCNC: 100 MMOL/L (ref 99–110)
CHLORIDE BLD-SCNC: 101 MMOL/L (ref 99–110)
CO2: 24 MMOL/L (ref 21–32)
CO2: 25 MMOL/L (ref 21–32)
CREAT SERPL-MCNC: 2.1 MG/DL (ref 0.6–1.2)
CREAT SERPL-MCNC: 2.2 MG/DL (ref 0.6–1.2)
CULTURE, RESPIRATORY: ABNORMAL
CULTURE, RESPIRATORY: ABNORMAL
EOSINOPHILS ABSOLUTE: 0 K/UL (ref 0–0.6)
EOSINOPHILS RELATIVE PERCENT: 0 %
GFR AFRICAN AMERICAN: 27
GFR AFRICAN AMERICAN: 28
GFR NON-AFRICAN AMERICAN: 22
GFR NON-AFRICAN AMERICAN: 23
GLOBULIN: 3.5 G/DL
GLOBULIN: 3.6 G/DL
GLUCOSE BLD-MCNC: 126 MG/DL (ref 70–99)
GLUCOSE BLD-MCNC: 126 MG/DL (ref 70–99)
GLUCOSE BLD-MCNC: 127 MG/DL (ref 70–99)
GLUCOSE BLD-MCNC: 131 MG/DL (ref 70–99)
GLUCOSE BLD-MCNC: 137 MG/DL (ref 70–99)
GLUCOSE BLD-MCNC: 151 MG/DL (ref 70–99)
GLUCOSE BLD-MCNC: 162 MG/DL (ref 70–99)
GRAM STAIN RESULT: ABNORMAL
HCO3 ARTERIAL: 25.2 MMOL/L (ref 21–29)
HCT VFR BLD CALC: 22.8 % (ref 36–48)
HEMOGLOBIN: 7.3 G/DL (ref 12–16)
LACTATE: 0.87 MMOL/L (ref 0.4–2)
LYMPHOCYTES ABSOLUTE: 1.1 K/UL (ref 1–5.1)
LYMPHOCYTES RELATIVE PERCENT: 7 %
MAGNESIUM: 2.7 MG/DL (ref 1.8–2.4)
MCH RBC QN AUTO: 31.3 PG (ref 26–34)
MCHC RBC AUTO-ENTMCNC: 32 G/DL (ref 31–36)
MCV RBC AUTO: 97.7 FL (ref 80–100)
MONOCYTES ABSOLUTE: 0.2 K/UL (ref 0–1.3)
MONOCYTES RELATIVE PERCENT: 1 %
NEUTROPHILS ABSOLUTE: 14.6 K/UL (ref 1.7–7.7)
NEUTROPHILS RELATIVE PERCENT: 91 %
NUCLEATED RED BLOOD CELLS: 1 /100 WBC
O2 SAT, ARTERIAL: 99 % (ref 93–100)
ORGANISM: ABNORMAL
PCO2 ARTERIAL: 39 MM HG (ref 35–45)
PDW BLD-RTO: 15.8 % (ref 12.4–15.4)
PERFORMED ON: ABNORMAL
PH ARTERIAL: 7.42 (ref 7.35–7.45)
PH VENOUS: 7.41 (ref 7.35–7.45)
PHOSPHORUS: 2.8 MG/DL (ref 2.5–4.9)
PHOSPHORUS: 3 MG/DL (ref 2.5–4.9)
PLATELET # BLD: 151 K/UL (ref 135–450)
PMV BLD AUTO: 8.6 FL (ref 5–10.5)
PO2 ARTERIAL: 132.2 MM HG (ref 75–108)
POC POTASSIUM: 4.8 MMOL/L (ref 3.5–5.1)
POC SAMPLE TYPE: ABNORMAL
POC SODIUM: 137 MMOL/L (ref 136–145)
POTASSIUM SERPL-SCNC: 4.9 MMOL/L (ref 3.5–5.1)
POTASSIUM SERPL-SCNC: 5.1 MMOL/L (ref 3.5–5.1)
RBC # BLD: 2.34 M/UL (ref 4–5.2)
SODIUM BLD-SCNC: 135 MMOL/L (ref 136–145)
SODIUM BLD-SCNC: 135 MMOL/L (ref 136–145)
TCO2 ARTERIAL: 26 MMOL/L
TOTAL PROTEIN: 6.2 G/DL (ref 6.4–8.2)
TOTAL PROTEIN: 6.5 G/DL (ref 6.4–8.2)
WBC # BLD: 15.9 K/UL (ref 4–11)

## 2021-09-20 PROCEDURE — 82947 ASSAY GLUCOSE BLOOD QUANT: CPT

## 2021-09-20 PROCEDURE — 6360000002 HC RX W HCPCS: Performed by: STUDENT IN AN ORGANIZED HEALTH CARE EDUCATION/TRAINING PROGRAM

## 2021-09-20 PROCEDURE — 2580000003 HC RX 258: Performed by: HOSPITALIST

## 2021-09-20 PROCEDURE — 82330 ASSAY OF CALCIUM: CPT

## 2021-09-20 PROCEDURE — 99291 CRITICAL CARE FIRST HOUR: CPT | Performed by: INTERNAL MEDICINE

## 2021-09-20 PROCEDURE — 6360000002 HC RX W HCPCS: Performed by: HOSPITALIST

## 2021-09-20 PROCEDURE — 84100 ASSAY OF PHOSPHORUS: CPT

## 2021-09-20 PROCEDURE — 2000000000 HC ICU R&B

## 2021-09-20 PROCEDURE — 2700000000 HC OXYGEN THERAPY PER DAY

## 2021-09-20 PROCEDURE — 83735 ASSAY OF MAGNESIUM: CPT

## 2021-09-20 PROCEDURE — 85025 COMPLETE CBC W/AUTO DIFF WBC: CPT

## 2021-09-20 PROCEDURE — 94761 N-INVAS EAR/PLS OXIMETRY MLT: CPT

## 2021-09-20 PROCEDURE — 2500000003 HC RX 250 WO HCPCS: Performed by: SURGERY

## 2021-09-20 PROCEDURE — 2580000003 HC RX 258: Performed by: STUDENT IN AN ORGANIZED HEALTH CARE EDUCATION/TRAINING PROGRAM

## 2021-09-20 PROCEDURE — 90945 DIALYSIS ONE EVALUATION: CPT

## 2021-09-20 PROCEDURE — 37799 UNLISTED PX VASCULAR SURGERY: CPT

## 2021-09-20 PROCEDURE — 82803 BLOOD GASES ANY COMBINATION: CPT

## 2021-09-20 PROCEDURE — 80053 COMPREHEN METABOLIC PANEL: CPT

## 2021-09-20 PROCEDURE — 84295 ASSAY OF SERUM SODIUM: CPT

## 2021-09-20 PROCEDURE — 83605 ASSAY OF LACTIC ACID: CPT

## 2021-09-20 PROCEDURE — 36415 COLL VENOUS BLD VENIPUNCTURE: CPT

## 2021-09-20 PROCEDURE — 2500000003 HC RX 250 WO HCPCS

## 2021-09-20 PROCEDURE — 90945 DIALYSIS ONE EVALUATION: CPT | Performed by: INTERNAL MEDICINE

## 2021-09-20 PROCEDURE — 2580000003 HC RX 258: Performed by: SURGERY

## 2021-09-20 PROCEDURE — 84132 ASSAY OF SERUM POTASSIUM: CPT

## 2021-09-20 RX ADMIN — HEPARIN SODIUM 5000 UNITS: 5000 INJECTION INTRAVENOUS; SUBCUTANEOUS at 22:30

## 2021-09-20 RX ADMIN — SODIUM CHLORIDE 0.6 MCG/KG/HR: 9 INJECTION, SOLUTION INTRAVENOUS at 10:14

## 2021-09-20 RX ADMIN — Medication 10 ML: at 08:16

## 2021-09-20 RX ADMIN — FAMOTIDINE 20 MG: 10 INJECTION, SOLUTION INTRAVENOUS at 08:17

## 2021-09-20 RX ADMIN — HEPARIN SODIUM 5000 UNITS: 5000 INJECTION INTRAVENOUS; SUBCUTANEOUS at 06:26

## 2021-09-20 RX ADMIN — DEXTROSE MONOHYDRATE 1 MCG/MIN: 50 INJECTION, SOLUTION INTRAVENOUS at 05:15

## 2021-09-20 RX ADMIN — DEXAMETHASONE SODIUM PHOSPHATE 20 MG: 4 INJECTION, SOLUTION INTRAMUSCULAR; INTRAVENOUS at 09:24

## 2021-09-20 RX ADMIN — CALCIUM GLUCONATE 1000 MG: 98 INJECTION, SOLUTION INTRAVENOUS at 06:33

## 2021-09-20 RX ADMIN — SODIUM CHLORIDE 0.6 MCG/KG/HR: 9 INJECTION, SOLUTION INTRAVENOUS at 23:12

## 2021-09-20 RX ADMIN — INSULIN LISPRO 1 UNITS: 100 INJECTION, SOLUTION INTRAVENOUS; SUBCUTANEOUS at 21:45

## 2021-09-20 RX ADMIN — Medication 10 ML: at 21:09

## 2021-09-20 RX ADMIN — Medication: at 21:58

## 2021-09-20 RX ADMIN — Medication: at 00:35

## 2021-09-20 RX ADMIN — PIPERACILLIN AND TAZOBACTAM 3375 MG: 3; .375 INJECTION, POWDER, LYOPHILIZED, FOR SOLUTION INTRAVENOUS at 00:14

## 2021-09-20 RX ADMIN — PIPERACILLIN AND TAZOBACTAM 3375 MG: 3; .375 INJECTION, POWDER, LYOPHILIZED, FOR SOLUTION INTRAVENOUS at 15:58

## 2021-09-20 RX ADMIN — Medication: at 10:49

## 2021-09-20 RX ADMIN — PIPERACILLIN AND TAZOBACTAM 3375 MG: 3; .375 INJECTION, POWDER, LYOPHILIZED, FOR SOLUTION INTRAVENOUS at 08:01

## 2021-09-20 RX ADMIN — Medication: at 10:50

## 2021-09-20 RX ADMIN — Medication: at 05:18

## 2021-09-20 RX ADMIN — HEPARIN SODIUM 5000 UNITS: 5000 INJECTION INTRAVENOUS; SUBCUTANEOUS at 14:09

## 2021-09-20 ASSESSMENT — PULMONARY FUNCTION TESTS
PIF_VALUE: 16
PIF_VALUE: 20
PIF_VALUE: 28
PIF_VALUE: 18
PIF_VALUE: 21
PIF_VALUE: 17
PIF_VALUE: 20
PIF_VALUE: 19
PIF_VALUE: 33
PIF_VALUE: 21
PIF_VALUE: 24
PIF_VALUE: 31
PIF_VALUE: 21
PIF_VALUE: 38
PIF_VALUE: 28
PIF_VALUE: 16
PIF_VALUE: 17
PIF_VALUE: 15
PIF_VALUE: 19
PIF_VALUE: 17
PIF_VALUE: 25
PIF_VALUE: 31
PIF_VALUE: 15
PIF_VALUE: 18
PIF_VALUE: 29
PIF_VALUE: 23
PIF_VALUE: 20
PIF_VALUE: 24
PIF_VALUE: 20
PIF_VALUE: 22
PIF_VALUE: 31
PIF_VALUE: 16
PIF_VALUE: 18
PIF_VALUE: 24
PIF_VALUE: 40
PIF_VALUE: 28
PIF_VALUE: 17
PIF_VALUE: 8
PIF_VALUE: 22
PIF_VALUE: 22
PIF_VALUE: 16
PIF_VALUE: 21
PIF_VALUE: 16
PIF_VALUE: 24
PIF_VALUE: 24
PIF_VALUE: 25
PIF_VALUE: 25
PIF_VALUE: 29
PIF_VALUE: 26
PIF_VALUE: 20
PIF_VALUE: 20
PIF_VALUE: 15
PIF_VALUE: 20
PIF_VALUE: 15
PIF_VALUE: 23
PIF_VALUE: 22
PIF_VALUE: 17
PIF_VALUE: 19
PIF_VALUE: 18
PIF_VALUE: 24
PIF_VALUE: 24
PIF_VALUE: 19
PIF_VALUE: 28
PIF_VALUE: 16
PIF_VALUE: 16
PIF_VALUE: 25
PIF_VALUE: 15
PIF_VALUE: 19
PIF_VALUE: 22
PIF_VALUE: 16
PIF_VALUE: 22
PIF_VALUE: 21
PIF_VALUE: 22
PIF_VALUE: 15
PIF_VALUE: 16
PIF_VALUE: 18
PIF_VALUE: 16
PIF_VALUE: 24
PIF_VALUE: 22
PIF_VALUE: 24
PIF_VALUE: 29
PIF_VALUE: 15
PIF_VALUE: 16
PIF_VALUE: 21
PIF_VALUE: 15
PIF_VALUE: 19
PIF_VALUE: 18
PIF_VALUE: 15
PIF_VALUE: 27
PIF_VALUE: 22
PIF_VALUE: 19
PIF_VALUE: 27
PIF_VALUE: 25
PIF_VALUE: 20
PIF_VALUE: 19
PIF_VALUE: 16
PIF_VALUE: 21
PIF_VALUE: 26
PIF_VALUE: 20
PIF_VALUE: 39

## 2021-09-20 NOTE — PLAN OF CARE
Problem: Falls - Risk of:  Goal: Will remain free from falls  Description: Will remain free from falls  9/20/2021 1351 by Carole Alcaraz RN  Outcome: Ongoing  9/20/2021 0447 by Eliseo Agosto RN  Outcome: Ongoing  Goal: Absence of physical injury  Description: Absence of physical injury  Outcome: Ongoing     Problem: Non-Violent Restraints  Goal: Removal from restraints as soon as assessed to be safe  9/20/2021 1351 by Carole Alcaraz RN  Outcome: Ongoing  9/20/2021 0447 by Eliseo Agosto RN  Outcome: Ongoing  Goal: No harm/injury to patient while restraints in use  Outcome: Ongoing  Goal: Patient's dignity will be maintained  Outcome: Ongoing     Problem: Airway Clearance - Ineffective  Goal: Achieve or maintain patent airway  9/20/2021 1351 by Carole Alcaraz RN  Outcome: Ongoing  9/20/2021 0447 by Eliseo gAosto RN  Outcome: Ongoing     Problem: Gas Exchange - Impaired  Goal: Absence of hypoxia  Outcome: Ongoing  Goal: Promote optimal lung function  Outcome: Ongoing     Problem: Breathing Pattern - Ineffective  Goal: Ability to achieve and maintain a regular respiratory rate  9/20/2021 1351 by Carole Alcaraz RN  Outcome: Ongoing  9/20/2021 0447 by Eliseo Agosto RN  Outcome: Ongoing     Problem:  Body Temperature -  Risk of, Imbalanced  Goal: Ability to maintain a body temperature within defined limits  Outcome: Ongoing  Goal: Will regain or maintain usual level of consciousness  Outcome: Ongoing  Goal: Complications related to the disease process, condition or treatment will be avoided or minimized  Outcome: Ongoing     Problem: Isolation Precautions - Risk of Spread of Infection  Goal: Prevent transmission of infection  Outcome: Ongoing     Problem: Nutrition Deficits  Goal: Optimize nutritional status  Outcome: Ongoing     Problem: Risk for Fluid Volume Deficit  Goal: Maintain normal heart rhythm  Outcome: Ongoing  Goal: Maintain absence of muscle cramping  Outcome: Ongoing  Goal: Maintain normal serum potassium, sodium, calcium, phosphorus, and pH  Outcome: Ongoing     Problem: Loneliness or Risk for Loneliness  Goal: Demonstrate positive use of time alone when socialization is not possible  Outcome: Ongoing     Problem: Fatigue  Goal: Verbalize increase energy and improved vitality  Outcome: Ongoing     Problem: Patient Education: Go to Patient Education Activity  Goal: Patient/Family Education  Outcome: Ongoing     Problem: Skin Integrity:  Goal: Will show no infection signs and symptoms  Description: Will show no infection signs and symptoms  Outcome: Ongoing  Goal: Absence of new skin breakdown  Description: Absence of new skin breakdown  Outcome: Ongoing     Problem: Nutrition  Goal: Optimal nutrition therapy  9/20/2021 1351 by Iggy Klein RN  Outcome: Ongoing  9/20/2021 1105 by Vinicio Jones RD, LD  Note: Nutrition Problem #1: Inadequate oral intake  Intervention: Food and/or Nutrient Delivery: Continue NPO, Start Tube Feeding  Nutritional Goals: Pt will tolerate most appropriate form of nutrition to meet >75% of nutrition needs while intubated.

## 2021-09-20 NOTE — PLAN OF CARE
Problem: Nutrition  Goal: Optimal nutrition therapy  Note: Nutrition Problem #1: Inadequate oral intake  Intervention: Food and/or Nutrient Delivery: Continue NPO, Start Tube Feeding  Nutritional Goals: Pt will tolerate most appropriate form of nutrition to meet >75% of nutrition needs while intubated.

## 2021-09-20 NOTE — PROGRESS NOTES
No acute events overnight. VSS. Tolerating CRRT well, remains on levo @ 1 mcg. Afebrile. Remains sedated with a rass of -1. Intermittently follows commands. No urine output. Will continue to monitor closely.

## 2021-09-20 NOTE — PLAN OF CARE
Care plan reviewed.       Problem: Falls - Risk of:  Goal: Will remain free from falls  Description: Will remain free from falls  Outcome: Ongoing     Problem: Non-Violent Restraints  Goal: Removal from restraints as soon as assessed to be safe  Outcome: Ongoing     Problem: Airway Clearance - Ineffective  Goal: Achieve or maintain patent airway  Outcome: Ongoing     Problem: Breathing Pattern - Ineffective  Goal: Ability to achieve and maintain a regular respiratory rate  Outcome: Ongoing

## 2021-09-20 NOTE — PROGRESS NOTES
Patient has been admitted to the ICU; patient is Covid positive. To minimize the exposure, and preserve the PPE patient was not seen physically. We will defer the management to intensive care unit team.  Once the patient is Covid negative/out of the ICU, hospitalists will assume care.   Please call with questions.     Efe Rangel MD

## 2021-09-20 NOTE — CONSULTS
Comprehensive Nutrition Assessment    RD did not conduct direct, in-person nutrition evaluation in efforts to reduce exposure and use of PPE for high risk persons, PUI persons, patients who have tested positive for Covid-19 or those awaiting respiratory panel results. EMR was screened for nutrition risk factors, as defined per nutrition standards of care. RECOMMENDATIONS:  1. PO Diet: Continue NPO while intubated. 2. Nutrition Support:   · Recommend EN formula Peptide-Based  Vital AF 1.2 @ goal rate 55 ml/hr to provide 1320 ml total volume, 1584 kcal, 99 g protein and 1070 ml free water. · Initiate EN @ 20 mL/hr and as tolerated, increase by 25 mL/hr q 4 hours until goal of 55 mL/hr is met. · Recommend water bolus 75 ml every 4 hours. NUTRITION ASSESSMENT:   Type and Reason for Visit:  Type and Reason for Visit: Reassess, Consult   Nutritional summary & status: RD consulted for TF orders/recs. Pt NPO x5 days today. Intubated 9/15. Noted propofol stopped. Pt on CRRT. RD to monitor EN rate/tolerance.      Patient admitted d/t SOB, chills - covid-19    PMH significant for: CKD, current smoker    MALNUTRITION ASSESSMENT  Context of Malnutrition: Acute Illness   Malnutrition Status: Insufficient data  Findings of the 6 clinical characteristics of malnutrition (Minimum of 2 out of 6 clinical characteristics is required to make the diagnosis of moderate or severe Protein Calorie Malnutrition based on AND/ASPEN Guidelines):  Energy Intake: Less than/equal to 50% of estimated energy requirements    Energy Intake Time: 3 days intubated/NPO   Weight Loss %: Unable to assess    Weight loss Time: Unable to assess     NUTRITION DIAGNOSIS   · Inadequate oral intake related to impaired respiratory function as evidenced by NPO or clear liquid status due to medical condition      [address] and/or Nutrient Delivery:  Continue NPO, Start Tube Feeding  Nutrition Education/Counseling:  No recommendation at this time   Goals:  Pt will tolerate most appropriate form of nutrition to meet >75% of nutrition needs while intubated. Nutrition Monitoring and Evaluation:   Food/Nutrient Intake Outcomes:  Enteral Nutrition Intake/Tolerance  Physical Signs/Symptoms Outcomes:  Biochemical Data, Hemodynamic Status, Weight     OBJECTIVE DATA: Significant to nutrition assessment  · Nutrition-Focused Physical Findings: Nutrition Related Findings: lbm 9/19  · Labs: Reviewed; Na 135, Mg 2.7  · Meds: Reviewed; fentanyl, levo  · Wounds: Wound Type: None     CURRENT NUTRITION THERAPIES  Diet NPO   Current Tube Feeding (TF) Orders:  · Feeding Route: Orogastric  · Formula: Peptide Based  · Schedule: Continuous  · Additives/Modulars:    · Water Flushes: 75 mL q4h  · Current TF & Flush Orders Provides: 480 mL total volume, 576 kcal, 36 g protein, 364 ml free water  · Goal TF & Flush Orders Provides:  1320 ml total volume, 1584 kcal, 99 g protein and 1070 ml free water. PO Intake: Average Meal Intake: NPO   PO Supplement Intake:Average Supplements Intake: NPO  IVF: n/a     ANTHROPOMETRICS  Current Height: 5' 4\" (162.6 cm)  Current Weight: 137 lb 2 oz (62.2 kg)    Admission weight: 140 lb (63.5 kg)  Ideal Body Weight (lbs) (Calculated): 120 lbs (Ideal Body Weight (Kg) (Calculated): 55 kg)  Usual Bodyweight SHELLEY - no wt hx   Weight Changes SHELLEY        BMI BMI (Calculated): 23.6    Wt Readings from Last 50 Encounters:   09/19/21 137 lb 2 oz (62.2 kg)       COMPARATIVE STANDARDS  Energy (kcal):  9242-8100 kcal/d (25-30 kcal/kg); Weight Used for Energy Requirements:  Current     Protein (g):  79-92 g/d (1.3-1.5 g/kg); Weight Used for Protein Requirements:  Current      Fluid (ml/day):  4258-9806 mL/d; Method Used for Fluid Requirements:  1 ml/kcal      The patient will still be monitored per nutrition standards of care. Consult dietitian if nutrition interventions essential to patient care is needed.      Kaycee Roman RD, LD  Sina: [phone]  Office:  [phone]

## 2021-09-20 NOTE — CARE COORDINATION
Case Management Assessment           Daily Note                 Date/ Time of Note: 9/20/2021 10:03 AM         Note completed by: Rubén Amin RN    Patient Name: Terry Mcmillan  YOB: 1950    Diagnosis:Metabolic acidosis [Z51.9]  Acute renal failure (ARF) ([address] [N17.9]  Acute respiratory failure with hypoxia ([address] [J96.01]  Acute renal failure, unspecified acute renal failure type ([address] [N17.9]  Pneumonia of left lower lobe due to infectious organism [J18.9]  Suspected COVID-19 virus infection [Z20.822]  Patient Admission Status: Inpatient    Date of Admission:9/15/2021  8:00 PM Length of Stay: 5 GLOS: GMLOS: 4.8    Current Plan of Care: possible SBT today as sepsis has improved  ________________________________________________________________________________________  PT AM-PAC:   / 24 per last evaluation on: TBD    OT AM-PAC:   / 24 per last evaluation on: TBD    DME Needs for discharge: TBD  ________________________________________________________________________________________  Discharge Plan: To Be Determined DUE TO: remains intubated but possible SBT today    Tentative discharge date: TBD    Current barriers to discharge: not medically ready    Referrals completed: SNF: sent to [id number] to review    Resources/ information provided: Not indicated at this time  ________________________________________________________________________________________  Case Management Notes:Patient is from home with her brother but may need placement at d/c. Unsure needs at d/c but since she is currently on CRRT. Referral made with [facility]. If not appropriate for LTACH at d/c will need SNF that can accept COVID + patients. Teresa Zhong and her family were provided with choice of provider; she and her family are in agreement with the discharge plan.     Care Transition Patient: Mariela Amin RN  [facility]  Case Management Department  Ph: [phone]  Fax: [phone]

## 2021-09-20 NOTE — PROGRESS NOTES
Vital AF 1.2 initiated @ 20 mL/hr. Tube feed increased to 30 mL/hr. Goal is 55 mL/hr. CRRT continued. Filter changed at 1800. Bear hugger applied. SBT initiated. Trial failed. Remains sedated with a RASS of -1.

## 2021-09-20 NOTE — PROGRESS NOTES
ICU Progress Note    Admit Date: 9/15/2021             Antibiotics: Zosyn  Diet: Diet NPO    CC: SOB and chills     Interval history:   No acute events overnight  Continues to be on CRRT  On levo 1 mcg  Vent requirement stable, sedation req stable  Afebrile overnight    Medications:     Scheduled Meds:   insulin lispro  0-6 Units SubCUTAneous Q6H    dexamethasone  20 mg IntraVENous Q24H    Followed by   [facility] ON 9/22/2021] dexamethasone  10 mg IntraVENous Q24H    famotidine (PEPCID) injection  20 mg IntraVENous Daily    piperacillin-tazobactam  3,375 mg IntraVENous Q8H    sodium chloride flush  5-40 mL IntraVENous 2 times per day    heparin (porcine)  5,000 Units SubCUTAneous 3 times per day     Continuous Infusions:   dexmedetomidine (PRECEDEX) IV infusion 0.6 mcg/kg/hr (09/19/21 2307)    prismaSATE BGK 4/2.5 200 mL/hr at 09/19/21 1507    prismaSATE BGK 4/2.5 400 mL/hr at 09/19/21 2203    prismaSATE BGK 4/2.5 1,000 mL/hr at 09/20/21 0518    dextrose      propofol Stopped (09/19/21 1500)    fentaNYL 2000 mcg/200 mL IV infusion 25 mcg/hr (09/19/21 2307)    norepinephrine 1 mcg/min (09/20/21 0515)    vasopressin (Septic Shock) infusion Stopped (09/17/21 1240)    sodium chloride       PRN Meds:sodium phosphate IVPB **OR** sodium phosphate IVPB **OR** sodium phosphate IVPB **OR** sodium phosphate IVPB, calcium gluconate **OR** calcium gluconate **OR** calcium gluconate **OR** calcium gluconate, glucose, dextrose, glucagon (rDNA), dextrose, sodium chloride flush, sodium chloride, ondansetron **OR** ondansetron, polyethylene glycol, acetaminophen **OR** acetaminophen    Objective:   Vitals:   T-max:  Patient Vitals for the past 8 hrs:   BP Temp Temp src Pulse Resp SpO2   09/20/21 0715 -- -- -- 55 24 100 %   09/20/21 0700 100/76 -- -- 58 24 100 %   09/20/21 0645 97/69 -- -- 56 24 100 %   09/20/21 0630 95/72 -- -- 56 24 100 %   09/20/21 0615 97/70 -- -- 56 23 100 %   09/20/21 0600 98/71 -- -- 56 24 100 % 09/20/21 0545 99/73 -- -- 59 24 100 %   09/20/21 0530 92/70 -- -- 55 22 100 %   09/20/21 0515 89/67 -- -- 55 23 100 %   09/20/21 0500 91/70 -- -- 55 25 100 %   09/20/21 0445 94/70 -- -- 56 26 100 %   09/20/21 0430 97/71 -- -- 57 26 100 %   09/20/21 0415 105/74 -- -- 67 26 98 %   09/20/21 0401 -- -- -- 58 23 100 %   09/20/21 0400 95/73 97 °F (36.1 °C) Axillary 58 23 100 %   09/20/21 0345 94/69 -- -- 57 24 100 %   09/20/21 0330 92/70 -- -- 56 23 100 %   09/20/21 0315 95/73 -- -- 58 23 100 %   09/20/21 0300 96/72 -- -- 60 24 100 %   09/20/21 0245 93/71 -- -- 57 24 100 %   09/20/21 0230 95/71 -- -- 58 24 100 %   09/20/21 0215 96/69 -- -- 63 29 100 %   09/20/21 0200 91/69 -- -- 58 26 100 %   09/20/21 0145 94/70 -- -- 58 24 100 %   09/20/21 0130 89/71 -- -- 57 26 100 %   09/20/21 0115 91/68 -- -- 58 28 100 %   09/20/21 0100 91/68 -- -- 59 26 100 %   09/20/21 0045 92/69 -- -- 58 27 100 %   09/20/21 0030 92/68 -- -- 58 26 100 %   09/20/21 0015 100/75 -- -- 61 28 100 %   09/20/21 0001 -- -- -- -- 26 99 %   09/20/21 0000 98/72 97.2 °F (36.2 °C) Axillary 58 23 100 %   09/19/21 2345 94/70 -- -- 59 26 98 %       Intake/Output Summary (Last 24 hours) at 9/20/2021 0733  Last data filed at 9/20/2021 0700  Gross per 24 hour   Intake 821 ml   Output 1132 ml   Net -311 ml     Review of Systems   Unable to perform ROS: Intubated   All other systems reviewed and are negative. Physical Exam  Constitutional:       Appearance: She is not ill-appearing. HENT:      Head: Normocephalic and atraumatic. Eyes:      General: No scleral icterus. Cardiovascular:      Rate and Rhythm: Normal rate and regular rhythm. Pulmonary:      Effort: No respiratory distress. Breath sounds: No wheezing or rales. Comments: Crackles present  Abdominal:      General: There is no distension. Tenderness: There is no abdominal tenderness. There is no guarding. Musculoskeletal:      Right lower leg: No edema. Left lower leg: No edema. Final Result   Worsening bilateral pulmonary infiltrates. Placement of endotracheal    tube as above. XR CHEST PORTABLE   Final Result   1. Right IJ line projects over the mid SVC. No pneumothorax. 2.  Unchanged multifocal airspace disease. XR CHEST PORTABLE   Final Result    Patchy bilateral airspace opacities greatest in the left lung base suggestive of multifocal pneumonia. Assessment/Plan:   Janece Blizzard is a 73 y.o. female, who presented with 3 days SOB, hypoxic in ED with acute renal failure. Septic shock, improved  Urosepsis, improved  Possible pneumonia  COVID positive. However given low FiO2, covid is most likely an incidental finding. Sepsis likely from pneumonia vs urosepsis  - Afebrile, WBC improving. Urine grew Ecoli, pan sensitive  - continue Zosyn. Abx day 5    Acute hypoxic respiratory failure 2/2 multifocal pneumonia, COVID-19  - sedation-fentanyl- 25mcg/hr, precedex 0.6 mcg/kg/hr   - Vent Settings: Vent Mode: AC/PRVC Rate Set: 16 bmp/Vt Ordered: 375 mL/ /FiO2 : 40 %  - continue decadron 20 mg I/V for 5 days followed by 10 mg I/V daily   - received dose of toci on 9/16  Plan: will give a sedation holiday today, and then try SBT    Anemia, likely from CRRT   Hgb 7.3 stable   , haptoglobin 401. LFT wnl  - Monitor      Acute renal failure  History of CKD  - Nephrology folllowing  - on CRRT   - will follow renal panel closely  - daily weights, I/Os     AG metabolic acidosis, improved  Likely secondary to renal failure. S/p Got bicarb in ED  - CRRT      NSTEMI likely type 2  Likely 2/2 demand mismatch in setting of infection, renal failure  - Trops downtrended     Hyperglycemia on decardon  - hypoglycemia protocol  - LDSSI  - POCT glucose checks     Code Status: Full code   FEN: Diet NPO  PPX: heparin TID  DISPO: ICU    Сергей Crisostomo MD, PGY-2  09/20/21  7:33 AM    This patient has been staffed and discussed with Dr Poli Campbell.       Patient examined, findings as discussed with Dr. Jo Kaur. Agree with assessment and plan. Management of critical illness required for respiratory failure with septic shock and CHRISTOPHER. Hypoxemia improved, enabling decrease in FiO2 to 40%. Continues to have increased work of breathing, even with ventilator support and low-dose sedation. On brief SBT, she quickly develops rapid shallow breathing. Sedation is increased, and she is given increased VT to limit work of breathing on mechanical ventilation support. Gas exchange remains quite good. Currently improving septic shock with empiric antibiotic therapy, off pressor support.  Continuing CRRT for CHRISTOPHER  Time spent in critical care 50 minutes

## 2021-09-21 LAB
A/G RATIO: 0.8 (ref 1.1–2.2)
A/G RATIO: 0.9 (ref 1.1–2.2)
ALBUMIN SERPL-MCNC: 2.8 G/DL (ref 3.4–5)
ALBUMIN SERPL-MCNC: 3 G/DL (ref 3.4–5)
ALP BLD-CCNC: 63 U/L (ref 40–129)
ALP BLD-CCNC: 71 U/L (ref 40–129)
ALT SERPL-CCNC: 27 U/L (ref 10–40)
ALT SERPL-CCNC: 34 U/L (ref 10–40)
ANION GAP SERPL CALCULATED.3IONS-SCNC: 12 MMOL/L (ref 3–16)
ANION GAP SERPL CALCULATED.3IONS-SCNC: 9 MMOL/L (ref 3–16)
ANISOCYTOSIS: ABNORMAL
AST SERPL-CCNC: 35 U/L (ref 15–37)
AST SERPL-CCNC: 37 U/L (ref 15–37)
BASE EXCESS ARTERIAL: -0.1 MMOL/L (ref -3–3)
BASE EXCESS ARTERIAL: -1.3 MMOL/L (ref -3–3)
BASOPHILS ABSOLUTE: 0 K/UL (ref 0–0.2)
BASOPHILS ABSOLUTE: 0 K/UL (ref 0–0.2)
BASOPHILS RELATIVE PERCENT: 0 %
BASOPHILS RELATIVE PERCENT: 0 %
BILIRUB SERPL-MCNC: 0.3 MG/DL (ref 0–1)
BILIRUB SERPL-MCNC: 0.4 MG/DL (ref 0–1)
BUN BLDV-MCNC: 28 MG/DL (ref 7–20)
BUN BLDV-MCNC: 30 MG/DL (ref 7–20)
CALCIUM IONIZED: 1.04 MMOL/L (ref 1.12–1.32)
CALCIUM IONIZED: 1.05 MMOL/L (ref 1.12–1.32)
CALCIUM IONIZED: 1.08 MMOL/L (ref 1.12–1.32)
CALCIUM SERPL-MCNC: 7.6 MG/DL (ref 8.3–10.6)
CALCIUM SERPL-MCNC: 8 MG/DL (ref 8.3–10.6)
CARBOXYHEMOGLOBIN ARTERIAL: 0.8 % (ref 0–1.5)
CARBOXYHEMOGLOBIN ARTERIAL: 0.9 % (ref 0–1.5)
CHLORIDE BLD-SCNC: 101 MMOL/L (ref 99–110)
CHLORIDE BLD-SCNC: 99 MMOL/L (ref 99–110)
CO2: 22 MMOL/L (ref 21–32)
CO2: 24 MMOL/L (ref 21–32)
CREAT SERPL-MCNC: 1.8 MG/DL (ref 0.6–1.2)
CREAT SERPL-MCNC: 1.9 MG/DL (ref 0.6–1.2)
EKG ATRIAL RATE: 145 BPM
EKG ATRIAL RATE: 38 BPM
EKG DIAGNOSIS: NORMAL
EKG DIAGNOSIS: NORMAL
EKG P AXIS: 68 DEGREES
EKG P AXIS: 78 DEGREES
EKG P-R INTERVAL: 146 MS
EKG P-R INTERVAL: 180 MS
EKG Q-T INTERVAL: 258 MS
EKG Q-T INTERVAL: 572 MS
EKG QRS DURATION: 102 MS
EKG QRS DURATION: 84 MS
EKG QTC CALCULATION (BAZETT): 400 MS
EKG QTC CALCULATION (BAZETT): 454 MS
EKG R AXIS: -25 DEGREES
EKG R AXIS: 44 DEGREES
EKG T AXIS: 87 DEGREES
EKG T AXIS: 95 DEGREES
EKG VENTRICULAR RATE: 145 BPM
EKG VENTRICULAR RATE: 38 BPM
EOSINOPHILS ABSOLUTE: 0 K/UL (ref 0–0.6)
EOSINOPHILS ABSOLUTE: 0 K/UL (ref 0–0.6)
EOSINOPHILS RELATIVE PERCENT: 0 %
EOSINOPHILS RELATIVE PERCENT: 0 %
GFR AFRICAN AMERICAN: 32
GFR AFRICAN AMERICAN: 34
GFR NON-AFRICAN AMERICAN: 26
GFR NON-AFRICAN AMERICAN: 28
GLOBULIN: 3.3 G/DL
GLOBULIN: 3.5 G/DL
GLUCOSE BLD-MCNC: 177 MG/DL (ref 70–99)
GLUCOSE BLD-MCNC: 206 MG/DL (ref 70–99)
GLUCOSE BLD-MCNC: 297 MG/DL (ref 70–99)
HCO3 ARTERIAL: 26 MMOL/L (ref 21–29)
HCO3 ARTERIAL: 26 MMOL/L (ref 21–29)
HCT VFR BLD CALC: 22.8 % (ref 36–48)
HCT VFR BLD CALC: 24.2 % (ref 36–48)
HEMOGLOBIN, ART, EXTENDED: 5 G/DL
HEMOGLOBIN, ART, EXTENDED: 7.8 G/DL
HEMOGLOBIN: 7.2 G/DL (ref 12–16)
HEMOGLOBIN: 7.7 G/DL (ref 12–16)
LYMPHOCYTES ABSOLUTE: 1.2 K/UL (ref 1–5.1)
LYMPHOCYTES ABSOLUTE: 1.5 K/UL (ref 1–5.1)
LYMPHOCYTES RELATIVE PERCENT: 12 %
LYMPHOCYTES RELATIVE PERCENT: 9 %
MAGNESIUM: 2.5 MG/DL (ref 1.8–2.4)
MCH RBC QN AUTO: 31.1 PG (ref 26–34)
MCH RBC QN AUTO: 31.2 PG (ref 26–34)
MCHC RBC AUTO-ENTMCNC: 31.5 G/DL (ref 31–36)
MCHC RBC AUTO-ENTMCNC: 31.8 G/DL (ref 31–36)
MCV RBC AUTO: 97.8 FL (ref 80–100)
MCV RBC AUTO: 98.7 FL (ref 80–100)
METAMYELOCYTES RELATIVE PERCENT: 1 %
METHEMOGLOBIN ARTERIAL: 0.7 % (ref 0–1.4)
METHEMOGLOBIN ARTERIAL: 1.1 % (ref 0–1.4)
MONOCYTES ABSOLUTE: 0.4 K/UL (ref 0–1.3)
MONOCYTES ABSOLUTE: 0.8 K/UL (ref 0–1.3)
MONOCYTES RELATIVE PERCENT: 3 %
MONOCYTES RELATIVE PERCENT: 7 %
NEUTROPHILS ABSOLUTE: 11.7 K/UL (ref 1.7–7.7)
NEUTROPHILS ABSOLUTE: 9.8 K/UL (ref 1.7–7.7)
NEUTROPHILS RELATIVE PERCENT: 80 %
NEUTROPHILS RELATIVE PERCENT: 88 %
O2 SAT, ARTERIAL: 97 % (ref 93–100)
O2 SAT, ARTERIAL: 97 % (ref 93–100)
PCO2 ARTERIAL: 53.9 MMHG (ref 35–45)
PCO2 ARTERIAL: 57.5 MMHG (ref 35–45)
PDW BLD-RTO: 15.8 % (ref 12.4–15.4)
PDW BLD-RTO: 15.8 % (ref 12.4–15.4)
PERFORMED ON: ABNORMAL
PH ARTERIAL: 7.26 (ref 7.35–7.45)
PH ARTERIAL: 7.29 (ref 7.35–7.45)
PH VENOUS: 7.31 (ref 7.35–7.45)
PH VENOUS: 7.34 (ref 7.35–7.45)
PH VENOUS: 7.38 (ref 7.35–7.45)
PHOSPHORUS: 2.6 MG/DL (ref 2.5–4.9)
PHOSPHORUS: 2.8 MG/DL (ref 2.5–4.9)
PLATELET # BLD: 151 K/UL (ref 135–450)
PLATELET # BLD: 156 K/UL (ref 135–450)
PMV BLD AUTO: 8.7 FL (ref 5–10.5)
PMV BLD AUTO: 8.9 FL (ref 5–10.5)
PO2 ARTERIAL: 108 MMHG (ref 75–108)
PO2 ARTERIAL: 98.9 MMHG (ref 75–108)
POLYCHROMASIA: ABNORMAL
POTASSIUM SERPL-SCNC: 4.4 MMOL/L (ref 3.5–5.1)
POTASSIUM SERPL-SCNC: 4.9 MMOL/L (ref 3.5–5.1)
RBC # BLD: 2.31 M/UL (ref 4–5.2)
RBC # BLD: 2.47 M/UL (ref 4–5.2)
SCHISTOCYTES: ABNORMAL
SODIUM BLD-SCNC: 133 MMOL/L (ref 136–145)
SODIUM BLD-SCNC: 134 MMOL/L (ref 136–145)
STOMATOCYTES: ABNORMAL
TCO2 ARTERIAL: 28 MMOL/L
TCO2 ARTERIAL: 28 MMOL/L
TOTAL PROTEIN: 6.3 G/DL (ref 6.4–8.2)
TOTAL PROTEIN: 6.3 G/DL (ref 6.4–8.2)
WBC # BLD: 12.1 K/UL (ref 4–11)
WBC # BLD: 13.3 K/UL (ref 4–11)

## 2021-09-21 PROCEDURE — 84100 ASSAY OF PHOSPHORUS: CPT

## 2021-09-21 PROCEDURE — 2500000003 HC RX 250 WO HCPCS

## 2021-09-21 PROCEDURE — 2580000003 HC RX 258: Performed by: HOSPITALIST

## 2021-09-21 PROCEDURE — 93010 ELECTROCARDIOGRAM REPORT: CPT | Performed by: INTERNAL MEDICINE

## 2021-09-21 PROCEDURE — 37799 UNLISTED PX VASCULAR SURGERY: CPT

## 2021-09-21 PROCEDURE — 85025 COMPLETE CBC W/AUTO DIFF WBC: CPT

## 2021-09-21 PROCEDURE — 2700000000 HC OXYGEN THERAPY PER DAY

## 2021-09-21 PROCEDURE — 6360000002 HC RX W HCPCS: Performed by: HOSPITALIST

## 2021-09-21 PROCEDURE — 90945 DIALYSIS ONE EVALUATION: CPT

## 2021-09-21 PROCEDURE — 82330 ASSAY OF CALCIUM: CPT

## 2021-09-21 PROCEDURE — 2000000000 HC ICU R&B

## 2021-09-21 PROCEDURE — 90947 DIALYSIS REPEATED EVAL: CPT | Performed by: INTERNAL MEDICINE

## 2021-09-21 PROCEDURE — 80053 COMPREHEN METABOLIC PANEL: CPT

## 2021-09-21 PROCEDURE — 94761 N-INVAS EAR/PLS OXIMETRY MLT: CPT

## 2021-09-21 PROCEDURE — 2580000003 HC RX 258: Performed by: STUDENT IN AN ORGANIZED HEALTH CARE EDUCATION/TRAINING PROGRAM

## 2021-09-21 PROCEDURE — 6360000002 HC RX W HCPCS: Performed by: STUDENT IN AN ORGANIZED HEALTH CARE EDUCATION/TRAINING PROGRAM

## 2021-09-21 PROCEDURE — 2500000003 HC RX 250 WO HCPCS: Performed by: SURGERY

## 2021-09-21 PROCEDURE — 99291 CRITICAL CARE FIRST HOUR: CPT | Performed by: INTERNAL MEDICINE

## 2021-09-21 PROCEDURE — 6360000002 HC RX W HCPCS: Performed by: SURGERY

## 2021-09-21 PROCEDURE — 2580000003 HC RX 258: Performed by: SURGERY

## 2021-09-21 PROCEDURE — 82803 BLOOD GASES ANY COMBINATION: CPT

## 2021-09-21 PROCEDURE — 93005 ELECTROCARDIOGRAM TRACING: CPT | Performed by: STUDENT IN AN ORGANIZED HEALTH CARE EDUCATION/TRAINING PROGRAM

## 2021-09-21 PROCEDURE — 94003 VENT MGMT INPAT SUBQ DAY: CPT

## 2021-09-21 PROCEDURE — 36415 COLL VENOUS BLD VENIPUNCTURE: CPT

## 2021-09-21 PROCEDURE — 83735 ASSAY OF MAGNESIUM: CPT

## 2021-09-21 RX ORDER — ATROPINE SULFATE 1 MG/ML
1 INJECTION, SOLUTION INTRAMUSCULAR; INTRAVENOUS; SUBCUTANEOUS ONCE
Status: COMPLETED | OUTPATIENT
Start: 2021-09-21 | End: 2021-09-21

## 2021-09-21 RX ADMIN — HEPARIN SODIUM 5000 UNITS: 5000 INJECTION INTRAVENOUS; SUBCUTANEOUS at 15:11

## 2021-09-21 RX ADMIN — FAMOTIDINE 20 MG: 10 INJECTION, SOLUTION INTRAVENOUS at 09:04

## 2021-09-21 RX ADMIN — INSULIN LISPRO 2 UNITS: 100 INJECTION, SOLUTION INTRAVENOUS; SUBCUTANEOUS at 15:52

## 2021-09-21 RX ADMIN — CALCIUM GLUCONATE 1000 MG: 98 INJECTION, SOLUTION INTRAVENOUS at 06:32

## 2021-09-21 RX ADMIN — PIPERACILLIN AND TAZOBACTAM 3375 MG: 3; .375 INJECTION, POWDER, LYOPHILIZED, FOR SOLUTION INTRAVENOUS at 00:15

## 2021-09-21 RX ADMIN — PROPOFOL 25 MCG/KG/MIN: 10 INJECTION, EMULSION INTRAVENOUS at 20:52

## 2021-09-21 RX ADMIN — PIPERACILLIN AND TAZOBACTAM 3375 MG: 3; .375 INJECTION, POWDER, LYOPHILIZED, FOR SOLUTION INTRAVENOUS at 09:13

## 2021-09-21 RX ADMIN — PIPERACILLIN AND TAZOBACTAM 3375 MG: 3; .375 INJECTION, POWDER, LYOPHILIZED, FOR SOLUTION INTRAVENOUS at 15:50

## 2021-09-21 RX ADMIN — INSULIN LISPRO 1 UNITS: 100 INJECTION, SOLUTION INTRAVENOUS; SUBCUTANEOUS at 04:20

## 2021-09-21 RX ADMIN — CALCIUM GLUCONATE 1000 MG: 98 INJECTION, SOLUTION INTRAVENOUS at 02:11

## 2021-09-21 RX ADMIN — DEXTROSE MONOHYDRATE 150 MCG/HR: 5 INJECTION, SOLUTION INTRAVENOUS at 04:59

## 2021-09-21 RX ADMIN — Medication 125 MCG/HR: at 20:52

## 2021-09-21 RX ADMIN — INSULIN LISPRO 3 UNITS: 100 INJECTION, SOLUTION INTRAVENOUS; SUBCUTANEOUS at 22:00

## 2021-09-21 RX ADMIN — DEXAMETHASONE SODIUM PHOSPHATE 20 MG: 4 INJECTION, SOLUTION INTRAMUSCULAR; INTRAVENOUS at 09:10

## 2021-09-21 RX ADMIN — ATROPINE SULFATE 1 MG: 1 INJECTION, SOLUTION INTRAMUSCULAR; INTRAVENOUS; SUBCUTANEOUS at 06:51

## 2021-09-21 RX ADMIN — Medication: at 06:59

## 2021-09-21 RX ADMIN — PROPOFOL 10 MCG/KG/MIN: 10 INJECTION, EMULSION INTRAVENOUS at 09:12

## 2021-09-21 RX ADMIN — Medication: at 20:08

## 2021-09-21 RX ADMIN — Medication: at 02:55

## 2021-09-21 RX ADMIN — HEPARIN SODIUM 5000 UNITS: 5000 INJECTION INTRAVENOUS; SUBCUTANEOUS at 06:24

## 2021-09-21 RX ADMIN — HEPARIN SODIUM 5000 UNITS: 5000 INJECTION INTRAVENOUS; SUBCUTANEOUS at 22:19

## 2021-09-21 ASSESSMENT — PULMONARY FUNCTION TESTS
PIF_VALUE: 41
PIF_VALUE: 41
PIF_VALUE: 43
PIF_VALUE: 21
PIF_VALUE: 15
PIF_VALUE: 26
PIF_VALUE: 38
PIF_VALUE: 41
PIF_VALUE: 16
PIF_VALUE: 23
PIF_VALUE: 27
PIF_VALUE: 23
PIF_VALUE: 28
PIF_VALUE: 27
PIF_VALUE: 18
PIF_VALUE: 41
PIF_VALUE: 16
PIF_VALUE: 22
PIF_VALUE: 42
PIF_VALUE: 15
PIF_VALUE: 21
PIF_VALUE: 42
PIF_VALUE: 41
PIF_VALUE: 43
PIF_VALUE: 41
PIF_VALUE: 22
PIF_VALUE: 30
PIF_VALUE: 25
PIF_VALUE: 35
PIF_VALUE: 35
PIF_VALUE: 28
PIF_VALUE: 30
PIF_VALUE: 15
PIF_VALUE: 30
PIF_VALUE: 8
PIF_VALUE: 24
PIF_VALUE: 24
PIF_VALUE: 41
PIF_VALUE: 26
PIF_VALUE: 25
PIF_VALUE: 23
PIF_VALUE: 44
PIF_VALUE: 42
PIF_VALUE: 40
PIF_VALUE: 26
PIF_VALUE: 25
PIF_VALUE: 27
PIF_VALUE: 25
PIF_VALUE: 21
PIF_VALUE: 19
PIF_VALUE: 17
PIF_VALUE: 41
PIF_VALUE: 41
PIF_VALUE: 7
PIF_VALUE: 42
PIF_VALUE: 43
PIF_VALUE: 27
PIF_VALUE: 42
PIF_VALUE: 32
PIF_VALUE: 28
PIF_VALUE: 36
PIF_VALUE: 28
PIF_VALUE: 26
PIF_VALUE: 41
PIF_VALUE: 42

## 2021-09-21 NOTE — PLAN OF CARE
Care plan reviewed.       Problem: Falls - Risk of:  Goal: Will remain free from falls  Description: Will remain free from falls  Outcome: Ongoing     Problem: Non-Violent Restraints  Goal: Removal from restraints as soon as assessed to be safe  Outcome: Ongoing     Problem: Airway Clearance - Ineffective  Goal: Achieve or maintain patent airway  Outcome: Ongoing

## 2021-09-21 NOTE — PLAN OF CARE
Problem: Falls - Risk of:  Goal: Will remain free from falls  Description: Will remain free from falls  9/21/2021 1618 by Babar Doyle RN  Outcome: Ongoing  9/21/2021 0624 by Naomi Nam RN  Outcome: Ongoing  Goal: Absence of physical injury  Description: Absence of physical injury  Outcome: Ongoing     Problem: Non-Violent Restraints  Goal: Removal from restraints as soon as assessed to be safe  9/21/2021 1618 by Babar Doyle RN  Outcome: Ongoing  9/21/2021 0624 by Naomi Nam RN  Outcome: Ongoing  Goal: No harm/injury to patient while restraints in use  Outcome: Ongoing  Goal: Patient's dignity will be maintained  Outcome: Ongoing     Problem: Airway Clearance - Ineffective  Goal: Achieve or maintain patent airway  9/21/2021 1618 by Babar Doyle RN  Outcome: Ongoing  9/21/2021 0624 by Naomi Nam RN  Outcome: Ongoing     Problem: Gas Exchange - Impaired  Goal: Absence of hypoxia  Outcome: Ongoing  Goal: Promote optimal lung function  Outcome: Ongoing     Problem: Breathing Pattern - Ineffective  Goal: Ability to achieve and maintain a regular respiratory rate  Outcome: Ongoing     Problem:  Body Temperature -  Risk of, Imbalanced  Goal: Ability to maintain a body temperature within defined limits  Outcome: Ongoing  Goal: Will regain or maintain usual level of consciousness  Outcome: Ongoing  Goal: Complications related to the disease process, condition or treatment will be avoided or minimized  Outcome: Ongoing     Problem: Isolation Precautions - Risk of Spread of Infection  Goal: Prevent transmission of infection  Outcome: Ongoing     Problem: Isolation Precautions - Risk of Spread of Infection  Goal: Prevent transmission of infection  Outcome: Ongoing     Problem: Nutrition Deficits  Goal: Optimize nutritional status  Outcome: Ongoing     Problem: Risk for Fluid Volume Deficit  Goal: Maintain normal heart rhythm  Outcome: Ongoing

## 2021-09-21 NOTE — CARE COORDINATION
Patient is from home with her brother but may need placement at d/c. COVID positive and not vaccinated for COVID. Unsure needs at d/c but since she is currently on CRRT. Brief SBT, but failed. Nephrology thinking to switch to HD. Referral made with [facility]. If not appropriate for LTACH at d/c will need SNF that can accept COVID + patients (referral made to [facility] at this time). CM will continue to follow patient until discharge.  Electronically signed by Lauryn Gonzalez RN on 9/21/2021 at 10:42 AM

## 2021-09-21 NOTE — PROGRESS NOTES
Patient is admitted to the ICU; for complications of COVID. To minimize the exposure, and preserve the PPE patient was not seen physically. We will defer the management to intensive care unit team.  Once the patient is Covid negative/out of the ICU, hospitalists will assume care.     Please call with questions.     Heriberto Rubinstein, MD

## 2021-09-21 NOTE — PROGRESS NOTES
ICU Progress Note    Admit Date: 9/15/2021  Day: 7  Vent Day: 7  IV Access:Peripheral  IV Fluids:None  Vasopressors:Norepinephrine 3 mcg/hr              Antibiotics: Zosyn  Diet: Diet NPO  ADULT TUBE FEEDING; Orogastric; Peptide Based; Continuous; 20; Yes; 25; Q 4 hours; 55; 75; Q 4 hours    CC: SOB and Chills    Interval history: Pt bradycardic overnight, heart rate as low as 38. EKG was ordered, Norepinephrine restarted, HR improved with last value 56. Pt still intubated and receiving CRRT.       Medications:     Scheduled Meds:   insulin lispro  0-6 Units SubCUTAneous Q6H    [START ON 9/22/2021] dexamethasone  10 mg IntraVENous Q24H    famotidine (PEPCID) injection  20 mg IntraVENous Daily    piperacillin-tazobactam  3,375 mg IntraVENous Q8H    sodium chloride flush  5-40 mL IntraVENous 2 times per day    heparin (porcine)  5,000 Units SubCUTAneous 3 times per day     Continuous Infusions:   dexmedetomidine (PRECEDEX) IV infusion 0.2 mcg/kg/hr (09/21/21 0611)    prismaSATE BGK 4/2.5 200 mL/hr at 09/19/21 1507    prismaSATE BGK 4/2.5 400 mL/hr at 09/21/21 0659    prismaSATE BGK 4/2.5 1,000 mL/hr at 09/21/21 0255    dextrose      propofol 10 mcg/kg/min (09/21/21 0912)    fentaNYL 2000 mcg/200 mL IV infusion 150 mcg/hr (09/21/21 0459)    norepinephrine 3 mcg/min (09/21/21 0609)    vasopressin (Septic Shock) infusion Stopped (09/17/21 1240)    sodium chloride       PRN Meds:sodium phosphate IVPB **OR** sodium phosphate IVPB **OR** sodium phosphate IVPB **OR** sodium phosphate IVPB, calcium gluconate **OR** calcium gluconate **OR** calcium gluconate **OR** calcium gluconate, glucose, dextrose, glucagon (rDNA), dextrose, sodium chloride flush, sodium chloride, ondansetron **OR** ondansetron, polyethylene glycol, acetaminophen **OR** acetaminophen    Objective:   Vitals:   T-max:  Patient Vitals for the past 8 hrs:   BP Temp Temp src Pulse Resp SpO2   09/21/21 1200 130/81 97 °F (36.1 °C) Temporal 56 14 99 %   09/21/21 1145 -- -- -- 63 16 99 %   09/21/21 1130 -- -- -- 57 16 --   09/21/21 1115 -- -- -- 68 15 100 %   09/21/21 1105 -- -- -- 54 14 98 %   09/21/21 1100 (!) 142/90 -- -- 66 16 97 %   09/21/21 1045 -- -- -- 54 13 100 %   09/21/21 1030 -- -- -- (!) 39 16 98 %   09/21/21 1015 -- -- -- (!) 39 16 99 %   09/21/21 1000 100/69 -- -- (!) 40 16 99 %   09/21/21 0945 -- -- -- (!) 42 16 99 %   09/21/21 0930 -- -- -- (!) 43 16 99 %   09/21/21 0915 -- -- -- (!) 48 14 98 %   09/21/21 0900 89/69 -- -- 60 16 --   09/21/21 0845 -- -- -- 63 12 --   09/21/21 0830 -- -- -- 73 14 96 %   09/21/21 0818 -- -- -- 72 -- 94 %   09/21/21 0815 -- -- -- 68 13 96 %   09/21/21 0800 -- 97 °F (36.1 °C) Temporal 77 13 94 %   09/21/21 0745 92/71 -- -- 59 14 100 %   09/21/21 0730 106/76 -- -- 64 15 100 %   09/21/21 0715 112/80 -- -- 64 16 100 %   09/21/21 0700 98/72 -- -- 69 17 98 %   09/21/21 0600 115/74 -- -- (!) 38 14 99 %       Intake/Output Summary (Last 24 hours) at 9/21/2021 1301  Last data filed at 9/21/2021 1200  Gross per 24 hour   Intake 1766 ml   Output 865 ml   Net 901 ml       Review of Systems   Unable to perform ROS: Intubated       Physical Exam  Constitutional:       Appearance: She is obese. She is ill-appearing and toxic-appearing. She is not diaphoretic. HENT:      Head: Normocephalic and atraumatic. Cardiovascular:      Rate and Rhythm: Regular rhythm. Bradycardia present. Heart sounds: No murmur heard. No friction rub. No gallop. Pulmonary:      Breath sounds: No wheezing, rhonchi or rales. Abdominal:      General: Bowel sounds are normal. There is no distension. Palpations: Abdomen is soft. Tenderness: There is no abdominal tenderness. There is no guarding. Musculoskeletal:      Right lower leg: No edema. Left lower leg: No edema.            LABS:    CBC:   Recent Labs     09/20/21  0422 09/21/21  0430 09/21/21  1109   WBC 15.9* 13.3* 12.1*   HGB 7.3* 7.7* 7.2*   HCT 22.8* 24.2* 22.8*   PLT 151 151 156   MCV 97.7 97.8 98.7     Renal:    Recent Labs     09/19/21  0345 09/19/21  1600 09/20/21  0422 09/20/21  1619 09/21/21  0430      < > 135* 135* 133*   K 4.5   < > 4.9 5.1 4.4   CL 97*   < > 100 101 99   CO2 28   < > 24 25 22   BUN 26*   < > 29* 29* 30*   CREATININE 2.2*   < > 2.2* 2.1* 1.9*   GLUCOSE 131*   < > 137* 151* 177*   CALCIUM 8.2*   < > 8.2* 8.2* 8.0*   MG 2.60*  --  2.70*  --  2.50*   PHOS 3.1   < > 2.8 3.0 2.8   ANIONGAP 11   < > 11 9 12    < > = values in this interval not displayed. Hepatic:   Recent Labs     09/18/21  1616 09/18/21  2150 09/20/21  0422 09/20/21  1619 09/21/21  0430   AST 24   < > 22 31 35   ALT 14   < > 17 23 27   BILITOT <0.2   < > 0.4 0.4 0.4   BILIDIR <0.2  --   --   --   --    PROT 6.1*   < > 6.2* 6.5 6.3*   LABALBU 2.4*   < > 2.7* 2.9* 2.8*   ALKPHOS 63   < > 77 69 71    < > = values in this interval not displayed. Troponin: No results for input(s): TROPONINI in the last 72 hours. BNP: No results for input(s): BNP in the last 72 hours. Lipids: No results for input(s): CHOL, HDL in the last 72 hours. Invalid input(s): LDLCALCU, TRIGLYCERIDE  ABGs:    Recent Labs     09/20/21  1053 09/21/21  0905   PHART 7.417 7.295*   EHG2FOM 39.0 53.9*   PO2ART 132.2* 98.9   TPE5CDE 25.2 26   BEART 1 -0.1   F5IAAZHT 99 97   OVD0LQV 26 28       INR: No results for input(s): INR in the last 72 hours. Lactate:   Recent Labs     09/20/21  1053   LACTATE 0.87     Cultures:  -----------------------------------------------------------------  RAD:   XR CHEST PORTABLE   Final Result   Worsening bilateral pulmonary infiltrates. Placement of endotracheal    tube as above. XR CHEST PORTABLE   Final Result   1. Right IJ line projects over the mid SVC. No pneumothorax. 2.  Unchanged multifocal airspace disease. XR CHEST PORTABLE   Final Result    Patchy bilateral airspace opacities greatest in the left lung base suggestive of multifocal pneumonia. Assessment/Plan:     Septic shock, improved  Urosepsis, improved  Possible pneumonia  COVID positive. However given low FiO2, covid may be an incidental finding. Sepsis likely from pneumonia vs urosepsis     - Afebrile, WBC improving. Urine grew Ecoli, pan sensitive  - continue Zosyn. Abx day 6     Acute hypoxic respiratory failure 2/2 multifocal pneumonia, COVID-19    - Consider SVT and sedation holiday tomorrow  - sedation-fentanyl- 12.5mcg/hr, precedex 0.2 mcg/kg/hr   - Vent Settings: Vent Mode: AC/PRVC Rate Set: 16 bmp/Vt Ordered: 375 mL/ /FiO2 : 40 %, PEEP 5  - Continue decadron 20 mg I/V for five days (9/17 tp 9/21)  - Decadron 10mg IV for 5 days (9/22 to )  - s/p tocilizumab (9/16)     Anemia, likely from CRRT   , haptoglobin 401. LFT wnl    - Hgb 7.7, stable   - CBC daily     Acute renal failure  History of CKD  Pt receiving CRRT. - CRRT   - RFP daily  - Strict I/O  - Daily weights  - Nephrology folllowing    AG metabolic acidosis, improved  CO 22 trending downward. 7.310 pH. Likely secondary to renal failure. S/p Got bicarb in ED.    - CRRT   - Nephrology following     NSTEMI likely type 2  Likely 2/2 demand mismatch in setting of infection, renal failure. Troponin trended 0.14, 0.10. No longer trending values. - Trops downtrended     Hyperglycemia on decardon    - LDSSI  - hypoglycemia protocol  - POCT glucose checks    Code Status: Full Code  FEN: Diet NPO  ADULT TUBE FEEDING; Orogastric; Peptide Based; Continuous; 20; Yes; 25; Q 4 hours; 55; 75; Q 4 hours  PPX: Heparin TID, SCDs  DISPO: IP    Para Duverney, DO, PGY-1  09/21/21  1:01 PM    This patient has been staffed and discussed with Dr. Gloria Covarrubias. Patient examined, findings as discussed with Dr. Fabio Pringle. Agree with assessment and plan. Management of critical care issues, with primarily hypoxic and hypercapnic respiratory failure.   She is not tolerant of any reduction ventilatory support, exhibiting increased work of breathing minute ventilation requirements are moderate, and ventilation delivery modified to limit patient work of breathing. Gas exchange remains improved. She requires continued sedation, and propofol was added because of rising fentanyl requirement. Dexmedetomidine discontinued because of bradycardia. Continue empiric treatment lower respiratory infection and urinary tract infection. Ongoing limitation to ventilation is likely related to COPD, as well as LRI. Continuing steroids for this process, as well as Covid.   Time spent in critical care 45 minutes

## 2021-09-21 NOTE — PROGRESS NOTES
Janna Molina : [phone]     Fax :[phone]        Renal  Note    Patient:  Caty Anderson  MRN: [id number]    CHIEF COMPLAINT:  SOB, chills    History Obtained From:  electronic medical record    HISTORY OF PRESENT ILLNESS:   Caty Anderson is a 73 y.o. female with pmhx of CKD, HTN, PUD, pre-diabetes, current smoker, who presented with 3 days of SOB and increased work of breathing. In the ED, patient tachypnic with accessory muscle use and hypoxic, SBP in 200s. COVID +ve (not vaccinated). He was initially placed on Bipap  But resp failure worsened, requirng intubation. Patient had lab work that was consistent with acute renal failure severe acidosis Broad spectrum abx were and Nitro gtt was started. Nephorology was consulted for ARF management w/ CRRT.    09/20/21    This AM pt was seen and examined at bedside. CRRT is running, no issues. She is sedated, intubated and mechanically ventilated. She is off Levo.  Urine output minimal.       Past Medical History:     OA (osteoarthritis)    Hypertension    Personal history of tobacco use, presenting hazards to health    Lower limb length difference    Current smoker    Renal and perinephric abscess    Acute renal failure (HCC)    Anemia, unspecified    Routine adult health maintenance    Peptic ulcer, unspecified site, unspecified as acute or chronic, without mention of hemorrhage, perforation, or obstruction    Right hip pain    DDD (degenerative disc disease), lumbosacral    External thrombosed hemorrhoids    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 Medications Prior to Admission:    Prior to Admission medications    Medication Sig Start Date End Date Taking? Authorizing Provider   albuterol sulfate  (90 Base) MCG/ACT inhaler  9/7/21   Historical Provider, MD   atenolol (TENORMIN) 25 MG tablet Take 25 mg by mouth daily    Historical Provider, MD   ibuprofen (ADVIL;MOTRIN) 600 MG tablet TAKE 1 TABLET BY MOUTH THREE TIMES A DAY WITH FOOD 8/2/21   Historical Provider, MD       Allergies:  Patient has no known allergies. Social History:   TOBACCO:   reports that she has been smoking cigarettes. She has been smoking about 0.50 packs per day. She has never used smokeless tobacco.  ETOH:   reports previous alcohol use. OCCUPATION:      Family History:   History reviewed. No pertinent family history.         Physical Exam:    Vitals: BP (!) 117/98   Pulse 50   Temp 97 °F (36.1 °C) (Axillary)   Resp 15   Ht 5' 4\" (1.626 m)   Wt 137 lb 2 oz (62.2 kg)   SpO2 100%   BMI 23.54 kg/m²   Constitutional:  intubated  HEENT:  ETT in place  Neck: unable to assess JVD  Cardiovascular:  S1, S2 reg  Respiratory: symmetric lung expansion  Abdomen:  +BS, soft, ND  Ext: + lower extremity edema  Skin: dry/intact  CNS: sedated     CBC:   Recent Labs     09/18/21  1616 09/19/21  0345 09/20/21  0422   WBC 19.6* 17.8* 15.9*   HGB 7.2* 7.1* 7.3*    167 151     BMP:    Recent Labs     09/19/21  1600 09/20/21  0422 09/20/21  1619   * 135* 135*   K 4.9 4.9 5.1   CL 97* 100 101   CO2 26 24 25   BUN 26* 29* 29*   CREATININE 2.1* 2.2* 2.1*   GLUCOSE 154* 137* 151*     Hepatic:   Recent Labs     09/19/21  1600 09/20/21  0422 09/20/21  1619   AST 21 22 31   ALT 14 17 23   BILITOT 0.4 0.4 0.4   ALKPHOS 69 77 69     Troponin:   No results for input(s): TROPONINI in the last 72 hours. BNP: No results for input(s): BNP in the last 72 hours. Lipids: No results for input(s): CHOL, HDL in the last 72 hours. Invalid input(s): LDLCALCU  ABGs:   Lab Results   Component Value Date    PHART 7.417 09/20/2021    PO2ART 132.2 09/20/2021    WLU5PRP 39.0 09/20/2021     INR: No results for input(s): INR in the last 72 hours. -----------------------------------------------------------------      Assessment and Plan   1. Acute Renal Failure   W/ ho CKD. Pt non complain    2. Acid/Base electrolyte abnormalities    3. Anemia    4. Covid-19 infection    Patient Active Problem List   Diagnosis Code    Acute renal failure ([facility] [address] N17.9    2019 novel coronavirus-infected pneumonia ([facility]) U07.1, J12.82    Acute respiratory failure with hypoxia (HCC) J96.01    Septic shock ([facility]) A41.9, R65.21    Acute cystitis without hematuria N30.00    Pneumonia of left lower lobe due to infectious organism J18.9       Plan   - continue  CRRT    dose adjusted   Continue  post bag  4/2.5   Fluid removal: even to 25 ml/hr negative   - Monitor labs per CRRT protocol  - >300 proteins in UA. Will check urine protein, urine creatinine  -  Kappa/Lambda ratio 2.15  - COVID -19 treatment per primary team    Seen on CRRT   Daily assessment to switch to HD      Dose meds to GFR 25-30 on CRRT  Maintain SBP> 90 mmHg   Daily weights   AVOID NSAIDs  Avoid Nephrotoxins  Monitor Intake/Output  Call if significant decrease in urine output         Thank you for allowing us to participate in care of Mary Mcdonough MD  9/20/2021    [facility] [address]  Office : [phone]  Fax :[phone]

## 2021-09-22 ENCOUNTER — APPOINTMENT (OUTPATIENT)
Dept: GENERAL RADIOLOGY | Age: 71
DRG: 870 | End: 2021-09-22
Payer: MEDICARE

## 2021-09-22 LAB
A/G RATIO: 0.9 (ref 1.1–2.2)
A/G RATIO: 1 (ref 1.1–2.2)
ABO/RH: NORMAL
ALBUMIN SERPL-MCNC: 2.7 G/DL (ref 3.4–5)
ALBUMIN SERPL-MCNC: 2.7 G/DL (ref 3.4–5)
ALP BLD-CCNC: 63 U/L (ref 40–129)
ALP BLD-CCNC: 81 U/L (ref 40–129)
ALT SERPL-CCNC: 29 U/L (ref 10–40)
ALT SERPL-CCNC: 37 U/L (ref 10–40)
ANION GAP SERPL CALCULATED.3IONS-SCNC: 10 MMOL/L (ref 3–16)
ANION GAP SERPL CALCULATED.3IONS-SCNC: 12 MMOL/L (ref 3–16)
ANISOCYTOSIS: ABNORMAL
ANTIBODY IDENTIFICATION: NORMAL
ANTIBODY SCREEN: NORMAL
AST SERPL-CCNC: 23 U/L (ref 15–37)
AST SERPL-CCNC: 38 U/L (ref 15–37)
BANDED NEUTROPHILS RELATIVE PERCENT: 1 % (ref 0–7)
BASE EXCESS ARTERIAL: -1 (ref -3–3)
BASOPHILS ABSOLUTE: 0 K/UL (ref 0–0.2)
BASOPHILS RELATIVE PERCENT: 0 %
BILIRUB SERPL-MCNC: 0.3 MG/DL (ref 0–1)
BILIRUB SERPL-MCNC: 0.3 MG/DL (ref 0–1)
BLOOD BANK DISPENSE STATUS: NORMAL
BLOOD BANK DISPENSE STATUS: NORMAL
BLOOD BANK PRODUCT CODE: NORMAL
BLOOD BANK PRODUCT CODE: NORMAL
BPU ID: NORMAL
BPU ID: NORMAL
BUN BLDV-MCNC: 29 MG/DL (ref 7–20)
BUN BLDV-MCNC: 30 MG/DL (ref 7–20)
CALCIUM IONIZED: 1.01 MMOL/L (ref 1.12–1.32)
CALCIUM IONIZED: 1.05 MMOL/L (ref 1.12–1.32)
CALCIUM IONIZED: 1.06 MMOL/L (ref 1.12–1.32)
CALCIUM IONIZED: 1.06 MMOL/L (ref 1.12–1.32)
CALCIUM SERPL-MCNC: 7.4 MG/DL (ref 8.3–10.6)
CALCIUM SERPL-MCNC: 7.6 MG/DL (ref 8.3–10.6)
CHLORIDE BLD-SCNC: 100 MMOL/L (ref 99–110)
CHLORIDE BLD-SCNC: 99 MMOL/L (ref 99–110)
CO2: 22 MMOL/L (ref 21–32)
CO2: 23 MMOL/L (ref 21–32)
CREAT SERPL-MCNC: 1.8 MG/DL (ref 0.6–1.2)
CREAT SERPL-MCNC: 1.8 MG/DL (ref 0.6–1.2)
DAT IGG CAPTURE: NORMAL
DESCRIPTION BLOOD BANK: NORMAL
DESCRIPTION BLOOD BANK: NORMAL
EOSINOPHILS ABSOLUTE: 0 K/UL (ref 0–0.6)
EOSINOPHILS RELATIVE PERCENT: 0 %
GFR AFRICAN AMERICAN: 34
GFR AFRICAN AMERICAN: 34
GFR NON-AFRICAN AMERICAN: 28
GFR NON-AFRICAN AMERICAN: 28
GLOBULIN: 2.8 G/DL
GLOBULIN: 3.1 G/DL
GLUCOSE BLD-MCNC: 273 MG/DL (ref 70–99)
GLUCOSE BLD-MCNC: 281 MG/DL (ref 70–99)
GLUCOSE BLD-MCNC: 282 MG/DL (ref 70–99)
GLUCOSE BLD-MCNC: 289 MG/DL (ref 70–99)
GLUCOSE BLD-MCNC: 300 MG/DL (ref 70–99)
GLUCOSE BLD-MCNC: 304 MG/DL (ref 70–99)
HCO3 ARTERIAL: 24.3 MMOL/L (ref 21–29)
HCT VFR BLD CALC: 21.3 % (ref 36–48)
HCT VFR BLD CALC: 27.5 % (ref 36–48)
HEMOGLOBIN: 6.7 G/DL (ref 12–16)
HEMOGLOBIN: 8.8 G/DL (ref 12–16)
HYPOCHROMIA: ABNORMAL
LYMPHOCYTES ABSOLUTE: 0.2 K/UL (ref 1–5.1)
LYMPHOCYTES RELATIVE PERCENT: 1 %
MAGNESIUM: 2.5 MG/DL (ref 1.8–2.4)
MCH RBC QN AUTO: 31.1 PG (ref 26–34)
MCHC RBC AUTO-ENTMCNC: 31.6 G/DL (ref 31–36)
MCV RBC AUTO: 98.5 FL (ref 80–100)
METAMYELOCYTES RELATIVE PERCENT: 2 %
MONOCYTES ABSOLUTE: 0.4 K/UL (ref 0–1.3)
MONOCYTES RELATIVE PERCENT: 2 %
NEUTROPHILS ABSOLUTE: 17.9 K/UL (ref 1.7–7.7)
NEUTROPHILS RELATIVE PERCENT: 94 %
O2 SAT, ARTERIAL: 98 % (ref 93–100)
PCO2 ARTERIAL: 41.8 MM HG (ref 35–45)
PDW BLD-RTO: 15.7 % (ref 12.4–15.4)
PERFORMED ON: ABNORMAL
PH ARTERIAL: 7.37 (ref 7.35–7.45)
PH VENOUS: 7.34 (ref 7.35–7.45)
PH VENOUS: 7.35 (ref 7.35–7.45)
PH VENOUS: 7.38 (ref 7.35–7.45)
PH VENOUS: 7.38 (ref 7.35–7.45)
PHOSPHORUS: 1.9 MG/DL (ref 2.5–4.9)
PHOSPHORUS: 2.4 MG/DL (ref 2.5–4.9)
PLATELET # BLD: 156 K/UL (ref 135–450)
PMV BLD AUTO: 9.4 FL (ref 5–10.5)
PO2 ARTERIAL: 108.3 MM HG (ref 75–108)
POC SAMPLE TYPE: ABNORMAL
POTASSIUM SERPL-SCNC: 4.4 MMOL/L (ref 3.5–5.1)
POTASSIUM SERPL-SCNC: 4.6 MMOL/L (ref 3.5–5.1)
RBC # BLD: 2.16 M/UL (ref 4–5.2)
SODIUM BLD-SCNC: 132 MMOL/L (ref 136–145)
SODIUM BLD-SCNC: 134 MMOL/L (ref 136–145)
TCO2 ARTERIAL: 26 MMOL/L
TOTAL PROTEIN: 5.5 G/DL (ref 6.4–8.2)
TOTAL PROTEIN: 5.8 G/DL (ref 6.4–8.2)
WBC # BLD: 18.5 K/UL (ref 4–11)

## 2021-09-22 PROCEDURE — 99291 CRITICAL CARE FIRST HOUR: CPT | Performed by: INTERNAL MEDICINE

## 2021-09-22 PROCEDURE — 89220 SPUTUM SPECIMEN COLLECTION: CPT

## 2021-09-22 PROCEDURE — 83735 ASSAY OF MAGNESIUM: CPT

## 2021-09-22 PROCEDURE — 86880 COOMBS TEST DIRECT: CPT

## 2021-09-22 PROCEDURE — 94003 VENT MGMT INPAT SUBQ DAY: CPT

## 2021-09-22 PROCEDURE — 2700000000 HC OXYGEN THERAPY PER DAY

## 2021-09-22 PROCEDURE — 71045 X-RAY EXAM CHEST 1 VIEW: CPT

## 2021-09-22 PROCEDURE — 86901 BLOOD TYPING SEROLOGIC RH(D): CPT

## 2021-09-22 PROCEDURE — 85018 HEMOGLOBIN: CPT

## 2021-09-22 PROCEDURE — 94761 N-INVAS EAR/PLS OXIMETRY MLT: CPT

## 2021-09-22 PROCEDURE — 86870 RBC ANTIBODY IDENTIFICATION: CPT

## 2021-09-22 PROCEDURE — 90947 DIALYSIS REPEATED EVAL: CPT | Performed by: INTERNAL MEDICINE

## 2021-09-22 PROCEDURE — 6360000002 HC RX W HCPCS: Performed by: STUDENT IN AN ORGANIZED HEALTH CARE EDUCATION/TRAINING PROGRAM

## 2021-09-22 PROCEDURE — 87205 SMEAR GRAM STAIN: CPT

## 2021-09-22 PROCEDURE — 2000000000 HC ICU R&B

## 2021-09-22 PROCEDURE — 86850 RBC ANTIBODY SCREEN: CPT

## 2021-09-22 PROCEDURE — 2500000003 HC RX 250 WO HCPCS: Performed by: SURGERY

## 2021-09-22 PROCEDURE — 84100 ASSAY OF PHOSPHORUS: CPT

## 2021-09-22 PROCEDURE — 2500000003 HC RX 250 WO HCPCS

## 2021-09-22 PROCEDURE — 2580000003 HC RX 258: Performed by: STUDENT IN AN ORGANIZED HEALTH CARE EDUCATION/TRAINING PROGRAM

## 2021-09-22 PROCEDURE — 85025 COMPLETE CBC W/AUTO DIFF WBC: CPT

## 2021-09-22 PROCEDURE — 6370000000 HC RX 637 (ALT 250 FOR IP): Performed by: STUDENT IN AN ORGANIZED HEALTH CARE EDUCATION/TRAINING PROGRAM

## 2021-09-22 PROCEDURE — 6360000002 HC RX W HCPCS: Performed by: SURGERY

## 2021-09-22 PROCEDURE — 36415 COLL VENOUS BLD VENIPUNCTURE: CPT

## 2021-09-22 PROCEDURE — 87070 CULTURE OTHR SPECIMN AEROBIC: CPT

## 2021-09-22 PROCEDURE — 82803 BLOOD GASES ANY COMBINATION: CPT

## 2021-09-22 PROCEDURE — 37799 UNLISTED PX VASCULAR SURGERY: CPT

## 2021-09-22 PROCEDURE — 86922 COMPATIBILITY TEST ANTIGLOB: CPT

## 2021-09-22 PROCEDURE — P9016 RBC LEUKOCYTES REDUCED: HCPCS

## 2021-09-22 PROCEDURE — 2500000003 HC RX 250 WO HCPCS: Performed by: HOSPITALIST

## 2021-09-22 PROCEDURE — 86900 BLOOD TYPING SEROLOGIC ABO: CPT

## 2021-09-22 PROCEDURE — 80053 COMPREHEN METABOLIC PANEL: CPT

## 2021-09-22 PROCEDURE — 36430 TRANSFUSION BLD/BLD COMPNT: CPT

## 2021-09-22 PROCEDURE — 82330 ASSAY OF CALCIUM: CPT

## 2021-09-22 PROCEDURE — 85014 HEMATOCRIT: CPT

## 2021-09-22 PROCEDURE — 2580000003 HC RX 258: Performed by: HOSPITALIST

## 2021-09-22 PROCEDURE — 6360000002 HC RX W HCPCS: Performed by: HOSPITALIST

## 2021-09-22 PROCEDURE — 90945 DIALYSIS ONE EVALUATION: CPT

## 2021-09-22 RX ORDER — SODIUM CHLORIDE 9 MG/ML
INJECTION, SOLUTION INTRAVENOUS PRN
Status: DISCONTINUED | OUTPATIENT
Start: 2021-09-22 | End: 2021-09-23

## 2021-09-22 RX ORDER — INSULIN LISPRO 100 [IU]/ML
0-18 INJECTION, SOLUTION INTRAVENOUS; SUBCUTANEOUS EVERY 6 HOURS
Status: DISCONTINUED | OUTPATIENT
Start: 2021-09-22 | End: 2021-09-24

## 2021-09-22 RX ADMIN — PROPOFOL 30 MCG/KG/MIN: 10 INJECTION, EMULSION INTRAVENOUS at 06:57

## 2021-09-22 RX ADMIN — INSULIN LISPRO 3 UNITS: 100 INJECTION, SOLUTION INTRAVENOUS; SUBCUTANEOUS at 04:26

## 2021-09-22 RX ADMIN — Medication: at 09:26

## 2021-09-22 RX ADMIN — Medication 125 MCG/HR: at 21:30

## 2021-09-22 RX ADMIN — Medication: at 20:54

## 2021-09-22 RX ADMIN — HEPARIN SODIUM 5000 UNITS: 5000 INJECTION INTRAVENOUS; SUBCUTANEOUS at 06:13

## 2021-09-22 RX ADMIN — PIPERACILLIN AND TAZOBACTAM 3375 MG: 3; .375 INJECTION, POWDER, LYOPHILIZED, FOR SOLUTION INTRAVENOUS at 08:25

## 2021-09-22 RX ADMIN — Medication 10 ML: at 08:26

## 2021-09-22 RX ADMIN — HEPARIN SODIUM 5000 UNITS: 5000 INJECTION INTRAVENOUS; SUBCUTANEOUS at 15:56

## 2021-09-22 RX ADMIN — INSULIN GLARGINE 5 UNITS: 100 INJECTION, SOLUTION SUBCUTANEOUS at 14:22

## 2021-09-22 RX ADMIN — INSULIN LISPRO 9 UNITS: 100 INJECTION, SOLUTION INTRAVENOUS; SUBCUTANEOUS at 15:24

## 2021-09-22 RX ADMIN — PROPOFOL 40 MCG/KG/MIN: 10 INJECTION, EMULSION INTRAVENOUS at 12:30

## 2021-09-22 RX ADMIN — Medication: at 04:35

## 2021-09-22 RX ADMIN — Medication: at 22:19

## 2021-09-22 RX ADMIN — PIPERACILLIN AND TAZOBACTAM 3375 MG: 3; .375 INJECTION, POWDER, LYOPHILIZED, FOR SOLUTION INTRAVENOUS at 15:57

## 2021-09-22 RX ADMIN — SODIUM PHOSPHATE, MONOBASIC, MONOHYDRATE 6 MMOL: 276; 142 INJECTION, SOLUTION INTRAVENOUS at 18:56

## 2021-09-22 RX ADMIN — FAMOTIDINE 20 MG: 10 INJECTION, SOLUTION INTRAVENOUS at 08:19

## 2021-09-22 RX ADMIN — PROPOFOL 40 MCG/KG/MIN: 10 INJECTION, EMULSION INTRAVENOUS at 20:09

## 2021-09-22 RX ADMIN — INSULIN LISPRO 12 UNITS: 100 INJECTION, SOLUTION INTRAVENOUS; SUBCUTANEOUS at 10:30

## 2021-09-22 RX ADMIN — CALCIUM GLUCONATE 1000 MG: 98 INJECTION, SOLUTION INTRAVENOUS at 01:03

## 2021-09-22 RX ADMIN — PIPERACILLIN AND TAZOBACTAM 3375 MG: 3; .375 INJECTION, POWDER, LYOPHILIZED, FOR SOLUTION INTRAVENOUS at 00:00

## 2021-09-22 RX ADMIN — Medication: at 10:40

## 2021-09-22 RX ADMIN — Medication 125 MCG/HR: at 05:06

## 2021-09-22 RX ADMIN — CALCIUM GLUCONATE 1000 MG: 98 INJECTION, SOLUTION INTRAVENOUS at 05:54

## 2021-09-22 RX ADMIN — Medication 10 ML: at 21:04

## 2021-09-22 RX ADMIN — HEPARIN SODIUM 5000 UNITS: 5000 INJECTION INTRAVENOUS; SUBCUTANEOUS at 22:00

## 2021-09-22 RX ADMIN — Medication: at 15:55

## 2021-09-22 RX ADMIN — INSULIN LISPRO 9 UNITS: 100 INJECTION, SOLUTION INTRAVENOUS; SUBCUTANEOUS at 22:51

## 2021-09-22 RX ADMIN — CALCIUM GLUCONATE 1000 MG: 98 INJECTION, SOLUTION INTRAVENOUS at 15:26

## 2021-09-22 RX ADMIN — SODIUM PHOSPHATE, MONOBASIC, MONOHYDRATE 12 MMOL: 276; 142 INJECTION, SOLUTION INTRAVENOUS at 05:56

## 2021-09-22 RX ADMIN — Medication 125 MCG/HR: at 13:17

## 2021-09-22 RX ADMIN — DEXAMETHASONE SODIUM PHOSPHATE 10 MG: 4 INJECTION, SOLUTION INTRAMUSCULAR; INTRAVENOUS at 08:18

## 2021-09-22 RX ADMIN — PROPOFOL 40 MCG/KG/MIN: 10 INJECTION, EMULSION INTRAVENOUS at 18:09

## 2021-09-22 ASSESSMENT — PULMONARY FUNCTION TESTS
PIF_VALUE: 28
PIF_VALUE: 39
PIF_VALUE: 39
PIF_VALUE: 38
PIF_VALUE: 37
PIF_VALUE: 30
PIF_VALUE: 38
PIF_VALUE: 33
PIF_VALUE: 37
PIF_VALUE: 44
PIF_VALUE: 48
PIF_VALUE: 38
PIF_VALUE: 39
PIF_VALUE: 55
PIF_VALUE: 39
PIF_VALUE: 35
PIF_VALUE: 39
PIF_VALUE: 37
PIF_VALUE: 28
PIF_VALUE: 40
PIF_VALUE: 51
PIF_VALUE: 34
PIF_VALUE: 37
PIF_VALUE: 40
PIF_VALUE: 35
PIF_VALUE: 31
PIF_VALUE: 39
PIF_VALUE: 51
PIF_VALUE: 38
PIF_VALUE: 37
PIF_VALUE: 29
PIF_VALUE: 29
PIF_VALUE: 30
PIF_VALUE: 38
PIF_VALUE: 39
PIF_VALUE: 32
PIF_VALUE: 29
PIF_VALUE: 34
PIF_VALUE: 38
PIF_VALUE: 34
PIF_VALUE: 29
PIF_VALUE: 41

## 2021-09-22 ASSESSMENT — PAIN SCALES - GENERAL
PAINLEVEL_OUTOF10: 0

## 2021-09-22 NOTE — PLAN OF CARE
Care plan reviewed.        Problem: Falls - Risk of:  Goal: Will remain free from falls  Description: Will remain free from falls  9/22/2021 0608 by Vesta Lawrence RN  Outcome: Ongoing  9/21/2021 1618 by Marti Luis RN  Outcome: Ongoing  Goal: Absence of physical injury  Description: Absence of physical injury  9/21/2021 1618 by Marti Luis RN  Outcome: Ongoing     Problem: Non-Violent Restraints  Goal: Removal from restraints as soon as assessed to be safe  9/22/2021 0608 by Vesta Lawrence RN  Outcome: Ongoing  9/21/2021 1618 by Marti Luis RN  Outcome: Ongoing  Goal: No harm/injury to patient while restraints in use  9/21/2021 1618 by Marti Luis RN  Outcome: Ongoing  Goal: Patient's dignity will be maintained  9/21/2021 1618 by Marti Luis RN  Outcome: Ongoing     Problem: Airway Clearance - Ineffective  Goal: Achieve or maintain patent airway  9/22/2021 0608 by Vesta Lawrence RN  Outcome: Ongoing  9/21/2021 1618 by Marti Luis RN  Outcome: Ongoing

## 2021-09-22 NOTE — PROGRESS NOTES
ICU Progress Note    Admit Date: 9/15/2021  Day: 8  Vent Day: 8  IV Access:Peripheral  IV Fluids:None  Vasopressors:Norepinephrine 4 mcg/hr              Antibiotics: Zosyn  Diet: Diet NPO  ADULT TUBE FEEDING; Orogastric; Peptide Based; Continuous; 20; Yes; 25; Q 4 hours; 55; 75; Q 4 hours    CC: SOB and Chills    Interval history: No significant events noted overnight. Pt's still bradycardic with HR ranging between 60 to 43, her  to 87. CRRT machine took in air, had to stop, pt's Hgb 6.7. Pt to receive 2 units of pRBCs, will f/u on post transfusion Hgb/Hct. Pt WBC increased to 18.5 from 12.2. CXR displayed progression in the left lower lobe where there is obscuration of the left hemidiaphragm which is a new finding.     Medications:     Scheduled Meds:   insulin lispro  0-18 Units SubCUTAneous Q6H    dexamethasone  10 mg IntraVENous Q24H    famotidine (PEPCID) injection  20 mg IntraVENous Daily    piperacillin-tazobactam  3,375 mg IntraVENous Q8H    sodium chloride flush  5-40 mL IntraVENous 2 times per day    heparin (porcine)  5,000 Units SubCUTAneous 3 times per day     Continuous Infusions:   sodium chloride      fentaNYL 125 mcg/hr (09/22/21 0506)    dexmedetomidine (PRECEDEX) IV infusion 0.2 mcg/kg/hr (09/21/21 0611)    prismaSATE BGK 4/2.5 200 mL/hr at 09/19/21 1507    prismaSATE BGK 4/2.5 400 mL/hr at 09/21/21 2008    prismaSATE BGK 4/2.5 1,000 mL/hr at 09/22/21 0435    dextrose      propofol 30 mcg/kg/min (09/22/21 0657)    norepinephrine 4 mcg/min (09/22/21 0314)    vasopressin (Septic Shock) infusion Stopped (09/17/21 1240)    sodium chloride       PRN Meds:sodium chloride, sodium phosphate IVPB **OR** sodium phosphate IVPB **OR** sodium phosphate IVPB **OR** sodium phosphate IVPB, calcium gluconate **OR** calcium gluconate **OR** calcium gluconate **OR** calcium gluconate, glucose, dextrose, glucagon (rDNA), dextrose, sodium chloride flush, sodium chloride, ondansetron **OR** ondansetron, polyethylene glycol, acetaminophen **OR** acetaminophen    Objective:   Vitals:   T-max:  Patient Vitals for the past 8 hrs:   BP Temp Temp src Pulse Resp SpO2   09/22/21 0812 -- -- -- 68 -- 95 %   09/22/21 0645 -- -- -- 53 24 100 %   09/22/21 0630 -- -- -- 57 24 100 %   09/22/21 0615 -- -- -- 53 24 99 %   09/22/21 0600 (!) 98/58 -- -- 52 24 99 %   09/22/21 0545 -- -- -- 52 24 99 %   09/22/21 0530 -- -- -- 50 24 99 %   09/22/21 0515 -- -- -- 51 24 99 %   09/22/21 0500 112/70 -- -- (!) 46 24 100 %   09/22/21 0445 -- -- -- 50 24 99 %   09/22/21 0430 -- -- -- (!) 46 24 100 %   09/22/21 0415 -- -- -- (!) 46 24 100 %   09/22/21 0400 110/64 96.8 °F (36 °C) Tympanic (!) 47 24 100 %   09/22/21 0345 -- -- -- (!) 48 24 100 %   09/22/21 0330 -- -- -- 51 24 100 %   09/22/21 0315 -- -- -- 53 24 99 %   09/22/21 0300 87/61 -- -- 60 24 98 %   09/22/21 0245 -- -- -- 63 24 98 %   09/22/21 0230 -- -- -- 64 21 97 %   09/22/21 0215 -- -- -- 66 24 97 %   09/22/21 0200 (!) 140/71 -- -- 72 24 95 %   09/22/21 0145 -- -- -- 79 19 95 %   09/22/21 0130 -- -- -- 80 24 94 %   09/22/21 0115 -- -- -- 75 29 97 %   09/22/21 0100 106/60 -- -- 55 24 99 %   09/22/21 0045 -- -- -- 54 24 98 %       Intake/Output Summary (Last 24 hours) at 9/22/2021 0841  Last data filed at 9/22/2021 0700  Gross per 24 hour   Intake 2418 ml   Output 2117 ml   Net 301 ml       Review of Systems   Unable to perform ROS: Intubated       Physical Exam  Constitutional:       Appearance: She is obese. She is ill-appearing and toxic-appearing. She is not diaphoretic. HENT:      Head: Normocephalic and atraumatic. Cardiovascular:      Rate and Rhythm: Regular rhythm. Bradycardia present. Heart sounds: No murmur heard. No friction rub. No gallop. Pulmonary:      Breath sounds: No wheezing, rhonchi or rales. Abdominal:      General: Bowel sounds are normal. There is no distension. Palpations: Abdomen is soft. Tenderness:  There is no abdominal tenderness. There is no guarding. Musculoskeletal:      Right lower leg: No edema. Left lower leg: No edema. LABS:    CBC:   Recent Labs     09/21/21  0430 09/21/21  1109 09/22/21  0413   WBC 13.3* 12.1* 18.5*   HGB 7.7* 7.2* 6.7*   HCT 24.2* 22.8* 21.3*    156 156   MCV 97.8 98.7 98.5     Renal:    Recent Labs     09/20/21  0422 09/20/21  1619 09/21/21  0430 09/21/21  1513 09/22/21  0413   *   < > 133* 134* 134*   K 4.9   < > 4.4 4.9 4.6      < > 99 101 99   CO2 24   < > 22 24 23   BUN 29*   < > 30* 28* 30*   CREATININE 2.2*   < > 1.9* 1.8* 1.8*   GLUCOSE 137*   < > 177* 206* 282*   CALCIUM 8.2*   < > 8.0* 7.6* 7.6*   MG 2.70*  --  2.50*  --  2.50*   PHOS 2.8   < > 2.8 2.6 1.9*   ANIONGAP 11   < > 12 9 12    < > = values in this interval not displayed. Hepatic:   Recent Labs     09/21/21  0430 09/21/21  1513 09/22/21  0413   AST 35 37 23   ALT 27 34 29   BILITOT 0.4 0.3 0.3   PROT 6.3* 6.3* 5.8*   LABALBU 2.8* 3.0* 2.7*   ALKPHOS 71 63 63     Troponin: No results for input(s): TROPONINI in the last 72 hours. BNP: No results for input(s): BNP in the last 72 hours. Lipids: No results for input(s): CHOL, HDL in the last 72 hours. Invalid input(s): LDLCALCU, TRIGLYCERIDE  ABGs:    Recent Labs     09/20/21  1053 09/21/21  0905 09/21/21  1513   PHART 7.417 7.295* 7.262*   FXT5HJU 39.0 53.9* 57.5*   PO2ART 132.2* 98.9 108.0   NWS6FHU 25.2 26 26   BEART 1 -0.1 -1.3   U0WBZNXC 99 97 97   SXD5LLO 26 28 28       INR: No results for input(s): INR in the last 72 hours. Lactate:   Recent Labs     09/20/21  1053   LACTATE 0.87     Cultures:  -----------------------------------------------------------------  RAD:   XR CHEST PORTABLE   Final Result   Worsening bilateral pulmonary infiltrates. Placement of endotracheal    tube as above. XR CHEST PORTABLE   Final Result   1. Right IJ line projects over the mid SVC. No pneumothorax. 2.  Unchanged multifocal airspace disease. XR CHEST PORTABLE   Final Result    Patchy bilateral airspace opacities greatest in the left lung base suggestive of multifocal pneumonia. Assessment/Plan:     Septic shock  Urosepsis  Covid PNA  COVID positive. However given low FiO2, covid may be an incidental finding. Sepsis likely from pneumonia vs urosepsis. Concern for secondary infection, WBC increased, CXR (9/22) coarse multifocal opacities most compatible with viral pneumonia. There is progression in the left lower lobe where there is obscuration of the left hemidiaphragm which is a new finding.     - Aspiration cultures, if positive add gram positive agent  - CXR: new finding of obscuration of the left diaphragm.   - WBC 18.5, increased  - Thermal blanket  - Urine grew Ecoli, pan sensitive  - continue Zosyn. Abx day 7     Acute hypoxic/hypercapnic respiratory failure 2/2 multifocal pneumonia, COVID-19    - sedate with fentanyl and propofol   - Vent Settings: Mode: AC/PRVC, Rate Set: 24,  Vt: 425 mL, FiO2 : 40 %, PEEP 5  - Decadron 10mg IV for 5 days (9/22 to 9/26)  - s/p decadron 20 mg I/V for five days (9/17 tp 9/21)  - s/p tocilizumab (9/16)     Anemia, likely from CRRT   Pt's blood stuck in CRRT machine, Hgb 6.7, pt to be transfused. - f/u on post transfusion Hgb and Hct  - 2u pRBCs  - Hgb 6.7, decreased   - CBC daily     Acute renal failure  History of CKD  Pt receiving CRRT. - CRRT   - RFP daily  - Strict I/O  - Daily weights  - Nephrology folllowing    Hyperglycemia 2/2 to high dose steroids  Glucose elevated into the upper 200's and last value over 300.      - Will monitor and adjust as needed  - Start basal insulin  - High dose sliding scale insulin  - hypoglycemia protocol  - POCT glucose checks    Code Status: Full Code  FEN: Diet NPO  ADULT TUBE FEEDING; Orogastric; Peptide Based; Continuous; 20; Yes; 25; Q 4 hours; 55; 75; Q 4 hours  PPX: Heparin TID  DISPO: GOGO Lo DO, PGY-1  09/22/21  8:41 AM  Patient examined

## 2021-09-22 NOTE — PROGRESS NOTES
CRRT continuously alarming extremely negative access pressures. All interventions done without success. Air was being drawn into the system so CRRT machine stopped and blood was not returned to patient. New filter set up and machine re-started. No alarms so far. Will monitor.

## 2021-09-22 NOTE — PROGRESS NOTES
Colin Justus : [phone]     Fax :[phone]        Renal  Note    Patient:  Rita Amezcua  MRN: [id number]    CHIEF COMPLAINT:  SOB, chills    History Obtained From:  electronic medical record    HISTORY OF PRESENT ILLNESS:   Rita Amezcua is a 73 y.o. female with pmhx of CKD, HTN, PUD, pre-diabetes, current smoker, who presented with 3 days of SOB and increased work of breathing. In the ED, patient tachypnic with accessory muscle use and hypoxic, SBP in 200s. COVID +ve (not vaccinated). He was initially placed on Bipap  But resp failure worsened, requirng intubation. Patient had lab work that was consistent with acute renal failure severe acidosis Broad spectrum abx were and Nitro gtt was started.  Nephorology was consulted for ARF management w/ CRRT.    09/21/21    Pt seen on CRRT twice   UF as rodger  Wean pressors   UO poor       Past Medical History:     OA (osteoarthritis)    Hypertension    Personal history of tobacco use, presenting hazards to health    Lower limb length difference    Current smoker    Renal and perinephric abscess    Acute renal failure (HCC)    Anemia, unspecified    Routine adult health maintenance    Peptic ulcer, unspecified site, unspecified as acute or chronic, without mention of hemorrhage, perforation, or obstruction    Right hip pain    DDD (degenerative disc disease), lumbosacral    External thrombosed hemorrhoids    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 Medications Prior to Admission:    Prior to Admission medications    Medication Sig Start Date End Date Taking? Authorizing Provider   albuterol sulfate  (90 Base) MCG/ACT inhaler  9/7/21   Historical Provider, MD   atenolol (TENORMIN) 25 MG tablet Take 25 mg by mouth daily    Historical Provider, MD   ibuprofen (ADVIL;MOTRIN) 600 MG tablet TAKE 1 TABLET BY MOUTH THREE TIMES A DAY WITH FOOD 8/2/21   Historical Provider, MD       Allergies:  Patient has no known allergies. Social History:   TOBACCO:   reports that she has been smoking cigarettes. She has been smoking about 0.50 packs per day. She has never used smokeless tobacco.  ETOH:   reports previous alcohol use. OCCUPATION:      Family History:   History reviewed. No pertinent family history.         Physical Exam:    Vitals: /67   Pulse (!) 49   Temp 97 °F (36.1 °C) (Temporal)   Resp 24   Ht 5' 4\" (1.626 m)   Wt 137 lb 2 oz (62.2 kg)   SpO2 100%   BMI 23.54 kg/m²   Constitutional:  intubated  HEENT:  ETT in place  Neck: unable to assess JVD  Cardiovascular:  S1, S2 reg  Respiratory: symmetric lung expansion  Abdomen:  +BS, soft, ND  Ext: + lower extremity edema  Skin: dry/intact  CNS: sedated     CBC:   Recent Labs     09/20/21  0422 09/21/21  0430 09/21/21  1109   WBC 15.9* 13.3* 12.1*   HGB 7.3* 7.7* 7.2*    151 156     BMP:    Recent Labs     09/20/21  1619 09/21/21  0430 09/21/21  1513   * 133* 134*   K 5.1 4.4 4.9    99 101   CO2 25 22 24   BUN 29* 30* 28*   CREATININE 2.1* 1.9* 1.8*   GLUCOSE 151* 177* 206*     Hepatic:   Recent Labs     09/20/21  1619 09/21/21  0430 09/21/21  1513   AST 31 35 37   ALT 23 27 34   BILITOT 0.4 0.4 0.3   ALKPHOS 69 71 63     Troponin:   No results for input(s): TROPONINI in the last 72 hours. BNP: No results for input(s): BNP in the last 72 hours. Lipids: No results for input(s): CHOL, HDL in the last 72 hours. Invalid input(s): LDLCALCU  ABGs:   Lab Results   Component Value Date    PHART 7.262 09/21/2021    PO2ART 108.0 09/21/2021    NJA6DXT 57.5 09/21/2021     INR: No results for input(s): INR in the last 72 hours. -----------------------------------------------------------------      Assessment and Plan   1. Acute Renal Failure   W/ ho CKD. Pt non complain    2. Acid/Base electrolyte abnormalities    3. Anemia    4. Covid-19 infection    Patient Active Problem List   Diagnosis Code    Acute renal failure ([facility] [address] N17.9    2019 novel coronavirus-infected pneumonia ([facility]) U07.1, J12.82    Acute respiratory failure with hypoxia ([facility]) J96.01    Septic shock ([facility]) A41.9, R65.21    Acute cystitis without hematuria N30.00    Pneumonia of left lower lobe due to infectious organism J18.9       Plan   - continue  CRRT    dose adjusted   Fluid removal:  UF as rodger   - Monitor labs per CRRT protocol  - >300 proteins in UA. Will check urine protein, urine creatinine  -  Kappa/Lambda ratio 2.15  - COVID -19 treatment per primary team      Daily assessment to switch to HD      Dose meds to GFR 25-30 on CRRT  Maintain SBP> 90 mmHg   Daily weights   AVOID NSAIDs  Avoid Nephrotoxins  Monitor Intake/Output  Call if significant decrease in urine output         Thank you for allowing us to participate in care of Mary Rm MD  9/21/2021    [facility]  [address]  Office : [phone]  Fax :[phone]

## 2021-09-22 NOTE — PROGRESS NOTES
Patient is admitted to the ICU; for complications of COVID. To minimize the exposure, and preserve the PPE patient was not seen physically. We will defer the management to intensive care unit team.  Once the patient is Covid negative/out of the ICU, hospitalists will assume care.     Please call with questions.     Fernando Castellanos MD

## 2021-09-22 NOTE — CARE COORDINATION
Case Management Assessment           Daily Note                 Date/ Time of Note: 9/22/2021 3:41 PM         Note completed by: Socorro Garcia RN    Patient Name: Caty Anderson  YOB: 1950    Diagnosis:Metabolic acidosis [F13.1]  Acute renal failure (ARF) ([facility] [address] [N17.9]  Acute respiratory failure with hypoxia ([facility] [address] [J96.01]  Acute renal failure, unspecified acute renal failure type ([facility] [address] [N17.9]  Pneumonia of left lower lobe due to infectious organism [J18.9]  Suspected COVID-19 virus infection [Z20.822]  Patient Admission Status: Inpatient    Date of Admission:9/15/2021  8:00 PM Length of Stay: 7 GLOS: GMLOS: 12.4    Current Plan of Care: WBC increased, remains intubated and sedated/CRRT  ________________________________________________________________________________________  PT AM-PAC:   / 24 per last evaluation on: NA    OT AM-PAC:   / 24 per last evaluation on: NA    DME Needs for discharge: NA  ________________________________________________________________________________________  Discharge Plan: To Be Determined DUE TO: remains intubated and sedated/CRRT    Tentative discharge date: TBD    Current barriers to discharge: not medically ready    Referrals completed: Not Applicable    Resources/ information provided: Not indicated at this time  ________________________________________________________________________________________  Case Management Notes:Patient is from home with her brother. [facility] provided with choice of provider; she and her family are in agreement with the discharge plan.     Care Transition Patient: Mariela Garcia RN  [facility]  Case Management Department  Ph: [phone]  Fax: [phone]

## 2021-09-22 NOTE — PROGRESS NOTES
This AM hemoglobin 6.7. Residents notified of result and type and screen sent per order. Plan to infuse 1 unit PRBC. VSS.  Will monitor

## 2021-09-23 LAB
A/G RATIO: 0.9 (ref 1.1–2.2)
A/G RATIO: 0.9 (ref 1.1–2.2)
ALBUMIN SERPL-MCNC: 2.8 G/DL (ref 3.4–5)
ALBUMIN SERPL-MCNC: 3.1 G/DL (ref 3.4–5)
ALP BLD-CCNC: 80 U/L (ref 40–129)
ALP BLD-CCNC: 89 U/L (ref 40–129)
ALT SERPL-CCNC: 55 U/L (ref 10–40)
ALT SERPL-CCNC: 72 U/L (ref 10–40)
ANION GAP SERPL CALCULATED.3IONS-SCNC: 12 MMOL/L (ref 3–16)
ANION GAP SERPL CALCULATED.3IONS-SCNC: 12 MMOL/L (ref 3–16)
ANISOCYTOSIS: ABNORMAL
AST SERPL-CCNC: 42 U/L (ref 15–37)
AST SERPL-CCNC: 45 U/L (ref 15–37)
BANDED NEUTROPHILS RELATIVE PERCENT: 4 % (ref 0–7)
BASE EXCESS ARTERIAL: -1 (ref -3–3)
BASE EXCESS ARTERIAL: 0.7 MMOL/L (ref -3–3)
BASOPHILS ABSOLUTE: 0 K/UL (ref 0–0.2)
BASOPHILS RELATIVE PERCENT: 0 %
BILIRUB SERPL-MCNC: 0.3 MG/DL (ref 0–1)
BILIRUB SERPL-MCNC: 0.3 MG/DL (ref 0–1)
BUN BLDV-MCNC: 25 MG/DL (ref 7–20)
BUN BLDV-MCNC: 28 MG/DL (ref 7–20)
CALCIUM IONIZED: 1.06 MMOL/L (ref 1.12–1.32)
CALCIUM IONIZED: 1.06 MMOL/L (ref 1.12–1.32)
CALCIUM SERPL-MCNC: 7.6 MG/DL (ref 8.3–10.6)
CALCIUM SERPL-MCNC: 8 MG/DL (ref 8.3–10.6)
CARBOXYHEMOGLOBIN ARTERIAL: 0.9 % (ref 0–1.5)
CHLORIDE BLD-SCNC: 95 MMOL/L (ref 99–110)
CHLORIDE BLD-SCNC: 98 MMOL/L (ref 99–110)
CO2: 21 MMOL/L (ref 21–32)
CO2: 23 MMOL/L (ref 21–32)
CREAT SERPL-MCNC: 1.8 MG/DL (ref 0.6–1.2)
CREAT SERPL-MCNC: 2.2 MG/DL (ref 0.6–1.2)
EOSINOPHILS ABSOLUTE: 0 K/UL (ref 0–0.6)
EOSINOPHILS RELATIVE PERCENT: 0 %
GFR AFRICAN AMERICAN: 27
GFR AFRICAN AMERICAN: 34
GFR NON-AFRICAN AMERICAN: 22
GFR NON-AFRICAN AMERICAN: 28
GLOBULIN: 3.1 G/DL
GLOBULIN: 3.3 G/DL
GLUCOSE BLD-MCNC: 116 MG/DL (ref 70–99)
GLUCOSE BLD-MCNC: 233 MG/DL (ref 70–99)
GLUCOSE BLD-MCNC: 239 MG/DL (ref 70–99)
GLUCOSE BLD-MCNC: 249 MG/DL (ref 70–99)
GLUCOSE BLD-MCNC: 296 MG/DL (ref 70–99)
GLUCOSE BLD-MCNC: 314 MG/DL (ref 70–99)
HCO3 ARTERIAL: 25 MMOL/L (ref 21–29)
HCO3 ARTERIAL: 26 MMOL/L (ref 21–29)
HCT VFR BLD CALC: 28 % (ref 36–48)
HEMOGLOBIN, ART, EXTENDED: 9.1 G/DL
HEMOGLOBIN: 9.1 G/DL (ref 12–16)
HYPOCHROMIA: ABNORMAL
LYMPHOCYTES ABSOLUTE: 0.8 K/UL (ref 1–5.1)
LYMPHOCYTES RELATIVE PERCENT: 5 %
MACROCYTES: ABNORMAL
MAGNESIUM: 2.3 MG/DL (ref 1.8–2.4)
MCH RBC QN AUTO: 30.6 PG (ref 26–34)
MCHC RBC AUTO-ENTMCNC: 32.7 G/DL (ref 31–36)
MCV RBC AUTO: 93.6 FL (ref 80–100)
METAMYELOCYTES RELATIVE PERCENT: 2 %
METHEMOGLOBIN ARTERIAL: 0.7 % (ref 0–1.4)
MONOCYTES ABSOLUTE: 0.3 K/UL (ref 0–1.3)
MONOCYTES RELATIVE PERCENT: 2 %
MYELOCYTE PERCENT: 5 %
NEUTROPHILS ABSOLUTE: 14.5 K/UL (ref 1.7–7.7)
NEUTROPHILS RELATIVE PERCENT: 82 %
NUCLEATED RED BLOOD CELLS: 3 /100 WBC
O2 SAT, ARTERIAL: 90 % (ref 93–100)
O2 SAT, ARTERIAL: 98 % (ref 93–100)
PCO2 ARTERIAL: 44.3 MMHG (ref 35–45)
PCO2 ARTERIAL: 49.2 MM HG (ref 35–45)
PDW BLD-RTO: 18.5 % (ref 12.4–15.4)
PERFORMED ON: ABNORMAL
PH ARTERIAL: 7.31 (ref 7.35–7.45)
PH ARTERIAL: 7.38 (ref 7.35–7.45)
PH VENOUS: 7.33 (ref 7.35–7.45)
PH VENOUS: 7.34 (ref 7.35–7.45)
PHOSPHORUS: 2.1 MG/DL (ref 2.5–4.9)
PHOSPHORUS: 3.2 MG/DL (ref 2.5–4.9)
PLATELET # BLD: 142 K/UL (ref 135–450)
PMV BLD AUTO: 9.3 FL (ref 5–10.5)
PO2 ARTERIAL: 64.4 MM HG (ref 75–108)
PO2 ARTERIAL: 96.2 MMHG (ref 75–108)
POC SAMPLE TYPE: ABNORMAL
POLYCHROMASIA: ABNORMAL
POTASSIUM SERPL-SCNC: 3.9 MMOL/L (ref 3.5–5.1)
POTASSIUM SERPL-SCNC: 4.5 MMOL/L (ref 3.5–5.1)
RBC # BLD: 2.99 M/UL (ref 4–5.2)
SODIUM BLD-SCNC: 130 MMOL/L (ref 136–145)
SODIUM BLD-SCNC: 131 MMOL/L (ref 136–145)
TCO2 ARTERIAL: 27 MMOL/L
TCO2 ARTERIAL: 27 MMOL/L
TOTAL PROTEIN: 5.9 G/DL (ref 6.4–8.2)
TOTAL PROTEIN: 6.4 G/DL (ref 6.4–8.2)
WBC # BLD: 15.6 K/UL (ref 4–11)

## 2021-09-23 PROCEDURE — 2500000003 HC RX 250 WO HCPCS: Performed by: HOSPITALIST

## 2021-09-23 PROCEDURE — 83735 ASSAY OF MAGNESIUM: CPT

## 2021-09-23 PROCEDURE — 6370000000 HC RX 637 (ALT 250 FOR IP): Performed by: INTERNAL MEDICINE

## 2021-09-23 PROCEDURE — 99291 CRITICAL CARE FIRST HOUR: CPT | Performed by: INTERNAL MEDICINE

## 2021-09-23 PROCEDURE — 82803 BLOOD GASES ANY COMBINATION: CPT

## 2021-09-23 PROCEDURE — 2700000000 HC OXYGEN THERAPY PER DAY

## 2021-09-23 PROCEDURE — 2000000000 HC ICU R&B

## 2021-09-23 PROCEDURE — 6360000002 HC RX W HCPCS: Performed by: STUDENT IN AN ORGANIZED HEALTH CARE EDUCATION/TRAINING PROGRAM

## 2021-09-23 PROCEDURE — 94640 AIRWAY INHALATION TREATMENT: CPT

## 2021-09-23 PROCEDURE — 94003 VENT MGMT INPAT SUBQ DAY: CPT

## 2021-09-23 PROCEDURE — 2580000003 HC RX 258: Performed by: SURGERY

## 2021-09-23 PROCEDURE — 6370000000 HC RX 637 (ALT 250 FOR IP): Performed by: STUDENT IN AN ORGANIZED HEALTH CARE EDUCATION/TRAINING PROGRAM

## 2021-09-23 PROCEDURE — 6360000002 HC RX W HCPCS: Performed by: SURGERY

## 2021-09-23 PROCEDURE — 2500000003 HC RX 250 WO HCPCS: Performed by: STUDENT IN AN ORGANIZED HEALTH CARE EDUCATION/TRAINING PROGRAM

## 2021-09-23 PROCEDURE — 2580000003 HC RX 258

## 2021-09-23 PROCEDURE — 85025 COMPLETE CBC W/AUTO DIFF WBC: CPT

## 2021-09-23 PROCEDURE — 82330 ASSAY OF CALCIUM: CPT

## 2021-09-23 PROCEDURE — 2500000003 HC RX 250 WO HCPCS: Performed by: SURGERY

## 2021-09-23 PROCEDURE — 6360000002 HC RX W HCPCS: Performed by: HOSPITALIST

## 2021-09-23 PROCEDURE — 36592 COLLECT BLOOD FROM PICC: CPT

## 2021-09-23 PROCEDURE — 84100 ASSAY OF PHOSPHORUS: CPT

## 2021-09-23 PROCEDURE — 6360000002 HC RX W HCPCS: Performed by: INTERNAL MEDICINE

## 2021-09-23 PROCEDURE — 90945 DIALYSIS ONE EVALUATION: CPT | Performed by: INTERNAL MEDICINE

## 2021-09-23 PROCEDURE — 36415 COLL VENOUS BLD VENIPUNCTURE: CPT

## 2021-09-23 PROCEDURE — 80053 COMPREHEN METABOLIC PANEL: CPT

## 2021-09-23 PROCEDURE — 2580000003 HC RX 258: Performed by: HOSPITALIST

## 2021-09-23 PROCEDURE — 2500000003 HC RX 250 WO HCPCS

## 2021-09-23 PROCEDURE — 2580000003 HC RX 258: Performed by: STUDENT IN AN ORGANIZED HEALTH CARE EDUCATION/TRAINING PROGRAM

## 2021-09-23 PROCEDURE — 94761 N-INVAS EAR/PLS OXIMETRY MLT: CPT

## 2021-09-23 RX ORDER — MORPHINE SULFATE 2 MG/ML
1 INJECTION, SOLUTION INTRAMUSCULAR; INTRAVENOUS ONCE
Status: COMPLETED | OUTPATIENT
Start: 2021-09-23 | End: 2021-09-23

## 2021-09-23 RX ORDER — OLANZAPINE 10 MG/1
5 INJECTION, POWDER, LYOPHILIZED, FOR SOLUTION INTRAMUSCULAR EVERY 6 HOURS PRN
Status: DISCONTINUED | OUTPATIENT
Start: 2021-09-23 | End: 2021-10-05 | Stop reason: HOSPADM

## 2021-09-23 RX ORDER — HYDRALAZINE HYDROCHLORIDE 20 MG/ML
10 INJECTION INTRAMUSCULAR; INTRAVENOUS EVERY 6 HOURS PRN
Status: DISCONTINUED | OUTPATIENT
Start: 2021-09-23 | End: 2021-10-05 | Stop reason: HOSPADM

## 2021-09-23 RX ORDER — LABETALOL HYDROCHLORIDE 5 MG/ML
10 INJECTION, SOLUTION INTRAVENOUS EVERY 6 HOURS PRN
Status: DISCONTINUED | OUTPATIENT
Start: 2021-09-23 | End: 2021-10-05 | Stop reason: HOSPADM

## 2021-09-23 RX ORDER — HEPARIN SODIUM 1000 [USP'U]/ML
2400 INJECTION, SOLUTION INTRAVENOUS; SUBCUTANEOUS
Status: COMPLETED | OUTPATIENT
Start: 2021-09-23 | End: 2021-09-23

## 2021-09-23 RX ORDER — NICOTINE 21 MG/24HR
1 PATCH, TRANSDERMAL 24 HOURS TRANSDERMAL DAILY
Status: DISCONTINUED | OUTPATIENT
Start: 2021-09-23 | End: 2021-10-05 | Stop reason: HOSPADM

## 2021-09-23 RX ORDER — IPRATROPIUM BROMIDE AND ALBUTEROL SULFATE 2.5; .5 MG/3ML; MG/3ML
1 SOLUTION RESPIRATORY (INHALATION)
Status: DISCONTINUED | OUTPATIENT
Start: 2021-09-23 | End: 2021-09-23

## 2021-09-23 RX ADMIN — Medication: at 02:01

## 2021-09-23 RX ADMIN — PIPERACILLIN AND TAZOBACTAM 3375 MG: 3; .375 INJECTION, POWDER, LYOPHILIZED, FOR SOLUTION INTRAVENOUS at 08:21

## 2021-09-23 RX ADMIN — Medication 175 MCG/HR: at 09:51

## 2021-09-23 RX ADMIN — HYDRALAZINE HYDROCHLORIDE 10 MG: 20 INJECTION INTRAMUSCULAR; INTRAVENOUS at 19:42

## 2021-09-23 RX ADMIN — CALCIUM GLUCONATE 1000 MG: 98 INJECTION, SOLUTION INTRAVENOUS at 00:09

## 2021-09-23 RX ADMIN — HEPARIN SODIUM 2400 UNITS: 1000 INJECTION INTRAVENOUS; SUBCUTANEOUS at 12:25

## 2021-09-23 RX ADMIN — CALCIUM GLUCONATE 1000 MG: 98 INJECTION, SOLUTION INTRAVENOUS at 06:34

## 2021-09-23 RX ADMIN — Medication: at 07:11

## 2021-09-23 RX ADMIN — LABETALOL HYDROCHLORIDE 10 MG: 5 INJECTION, SOLUTION INTRAVENOUS at 20:42

## 2021-09-23 RX ADMIN — MORPHINE SULFATE 1 MG: 2 INJECTION, SOLUTION INTRAMUSCULAR; INTRAVENOUS at 21:14

## 2021-09-23 RX ADMIN — Medication: at 10:30

## 2021-09-23 RX ADMIN — PIPERACILLIN AND TAZOBACTAM 3375 MG: 3; .375 INJECTION, POWDER, LYOPHILIZED, FOR SOLUTION INTRAVENOUS at 23:07

## 2021-09-23 RX ADMIN — Medication 175 MCG/HR: at 05:01

## 2021-09-23 RX ADMIN — INSULIN LISPRO 6 UNITS: 100 INJECTION, SOLUTION INTRAVENOUS; SUBCUTANEOUS at 10:17

## 2021-09-23 RX ADMIN — DEXAMETHASONE SODIUM PHOSPHATE 10 MG: 4 INJECTION, SOLUTION INTRAMUSCULAR; INTRAVENOUS at 08:22

## 2021-09-23 RX ADMIN — PIPERACILLIN AND TAZOBACTAM 3375 MG: 3; .375 INJECTION, POWDER, LYOPHILIZED, FOR SOLUTION INTRAVENOUS at 00:15

## 2021-09-23 RX ADMIN — PROPOFOL 30 MCG/KG/MIN: 10 INJECTION, EMULSION INTRAVENOUS at 10:25

## 2021-09-23 RX ADMIN — OLANZAPINE 5 MG: 10 INJECTION, POWDER, FOR SOLUTION INTRAMUSCULAR at 18:58

## 2021-09-23 RX ADMIN — SODIUM CHLORIDE 0.2 MCG/KG/HR: 9 INJECTION, SOLUTION INTRAVENOUS at 23:43

## 2021-09-23 RX ADMIN — PIPERACILLIN AND TAZOBACTAM 3375 MG: 3; .375 INJECTION, POWDER, LYOPHILIZED, FOR SOLUTION INTRAVENOUS at 15:55

## 2021-09-23 RX ADMIN — HEPARIN SODIUM 5000 UNITS: 5000 INJECTION INTRAVENOUS; SUBCUTANEOUS at 06:14

## 2021-09-23 RX ADMIN — FAMOTIDINE 20 MG: 10 INJECTION, SOLUTION INTRAVENOUS at 08:23

## 2021-09-23 RX ADMIN — HEPARIN SODIUM 5000 UNITS: 5000 INJECTION INTRAVENOUS; SUBCUTANEOUS at 14:35

## 2021-09-23 RX ADMIN — IPRATROPIUM BROMIDE AND ALBUTEROL 1 PUFF: 20; 100 SPRAY, METERED RESPIRATORY (INHALATION) at 20:37

## 2021-09-23 RX ADMIN — CALCIUM GLUCONATE 1000 MG: 98 INJECTION, SOLUTION INTRAVENOUS at 12:01

## 2021-09-23 RX ADMIN — Medication 10 ML: at 08:22

## 2021-09-23 RX ADMIN — INSULIN LISPRO 6 UNITS: 100 INJECTION, SOLUTION INTRAVENOUS; SUBCUTANEOUS at 04:30

## 2021-09-23 RX ADMIN — Medication 10 ML: at 20:04

## 2021-09-23 RX ADMIN — Medication 10 ML: at 20:03

## 2021-09-23 RX ADMIN — SODIUM PHOSPHATE, MONOBASIC, MONOHYDRATE 6 MMOL: 276; 142 INJECTION, SOLUTION INTRAVENOUS at 08:20

## 2021-09-23 RX ADMIN — INSULIN LISPRO 9 UNITS: 100 INJECTION, SOLUTION INTRAVENOUS; SUBCUTANEOUS at 16:41

## 2021-09-23 RX ADMIN — HEPARIN SODIUM 5000 UNITS: 5000 INJECTION INTRAVENOUS; SUBCUTANEOUS at 21:20

## 2021-09-23 RX ADMIN — PROPOFOL 40 MCG/KG/MIN: 10 INJECTION, EMULSION INTRAVENOUS at 04:00

## 2021-09-23 RX ADMIN — WATER: 1 INJECTION INTRAMUSCULAR; INTRAVENOUS; SUBCUTANEOUS at 19:43

## 2021-09-23 ASSESSMENT — PULMONARY FUNCTION TESTS
PIF_VALUE: 34
PIF_VALUE: 29
PIF_VALUE: 34
PIF_VALUE: 31
PIF_VALUE: 17
PIF_VALUE: 33
PIF_VALUE: 34
PIF_VALUE: 31
PIF_VALUE: 30
PIF_VALUE: 35
PIF_VALUE: 29
PIF_VALUE: 32
PIF_VALUE: 31
PIF_VALUE: 18
PIF_VALUE: 30

## 2021-09-23 ASSESSMENT — PAIN SCALES - GENERAL
PAINLEVEL_OUTOF10: 0
PAINLEVEL_OUTOF10: 4
PAINLEVEL_OUTOF10: 0

## 2021-09-23 NOTE — PROGRESS NOTES
----- Message from Truman Herrera MD sent at 3/9/2021  6:56 AM CST -----  Stabilization/improvement compared to last ultrasound see below for results  Continue with symptomatic treatment heating pads warm compresses rest and elevation  Repeat right upper extremity ultrasound in 6 weeks  IMPRESSION:        1. Persistent superficial thrombus of the right forearm basilic vein corresponding to the palpable abnormality. The upper arm basilic thrombus has improved/resolved suggesting interval improvement.     2. No evidence of acute right upper extremity deep venous thrombosis.   Rt at bedside to start patient on SBT. Sedation halved. Will monitor.

## 2021-09-23 NOTE — PROGRESS NOTES
ABG drawn. See results. Dr. Racquel Girmes and Dr. Powell Snare at bedside with orders to extubate. RT called.

## 2021-09-23 NOTE — CARE COORDINATION
Addendum: extubated and placed on 15L O2  Electronically signed by Chelly Izaguirre RN on 9/23/2021 at 4:13 PM          Case Management Assessment           Daily Note                 Date/ Time of Note: 9/23/2021 9:51 AM         Note completed by: Chelly Izaguirre RN    Patient Name: Kristen Golden  YOB: 1950    Diagnosis:Metabolic acidosis [U52.2]  Acute renal failure (ARF) ([address] [N17.9]  Acute respiratory failure with hypoxia ([address] [J96.01]  Acute renal failure, unspecified acute renal failure type ([address] [N17.9]  Pneumonia of left lower lobe due to infectious organism [J18.9]  Suspected COVID-19 virus infection [Z20.822]  Patient Admission Status: Inpatient    Date of Admission:9/15/2021  8:00 PM Length of Stay: 8 GLOS: GMLOS: 12.4    Current Plan of Care: remains intubated and sedated/CRRT  ________________________________________________________________________________________  PT AM-PAC:   / 24 per last evaluation on: TBD    OT AM-PAC:   / 24 per last evaluation on: TBD    DME Needs for discharge: TBD  ________________________________________________________________________________________  Discharge Plan: To Be Determined DUE TO: Remains intubated and sedated/CRRT    Tentative discharge date: TBD    Current barriers to discharge: sputum cx's pending, not medically ready    Referrals completed: [facility]    Resources/ information provided: Not indicated at this time  ________________________________________________________________________________________  Case Management Notes:Patient was living with her brother prior but per son will need placement at d/c.     Young Pedroza and her family were provided with choice of provider; she and her family are in agreement with the discharge plan.     Care Transition Patient: Mariela Izaguirre RN  [facility]  Case Management Department  Ph: [phone]  Fax: [phone]

## 2021-09-23 NOTE — PLAN OF CARE
Problem: Falls - Risk of:  Goal: Will remain free from falls  Description: Will remain free from falls  9/23/2021 0947 by Magaly Aburto RN  Outcome: Ongoing     Problem: Falls - Risk of:  Goal: Absence of physical injury  Description: Absence of physical injury  9/23/2021 0947 by Magaly Aburto RN  Outcome: Ongoing     Problem: Non-Violent Restraints  Goal: Removal from restraints as soon as assessed to be safe  9/23/2021 0947 by Magaly Aburto RN  Outcome: Ongoing     Problem: Non-Violent Restraints  Goal: No harm/injury to patient while restraints in use  9/23/2021 0947 by Magaly Aburto RN  Outcome: Ongoing     Problem: Non-Violent Restraints  Goal: Patient's dignity will be maintained  9/23/2021 0947 by Magaly Aburto RN  Outcome: Ongoing     Problem: Airway Clearance - Ineffective  Goal: Achieve or maintain patent airway  9/23/2021 0947 by Magaly Aburto RN  Outcome: Ongoing     Problem: Gas Exchange - Impaired  Goal: Absence of hypoxia  9/23/2021 0947 by Magaly Aburto RN  Outcome: Ongoing     Problem: Gas Exchange - Impaired  Goal: Promote optimal lung function  9/23/2021 0947 by Magaly Aburto RN  Outcome: Ongoing     Problem: Breathing Pattern - Ineffective  Goal: Ability to achieve and maintain a regular respiratory rate  9/23/2021 0947 by Magaly Aburto RN  Outcome: Ongoing     Problem: Body Temperature -  Risk of, Imbalanced  Goal: Ability to maintain a body temperature within defined limits  9/23/2021 0947 by Magaly Aburto RN  Outcome: Ongoing     Problem: Body Temperature -  Risk of, Imbalanced  Goal: Will regain or maintain usual level of consciousness  9/23/2021 0947 by Magaly Aburto RN  Outcome: Ongoing     Problem:  Body Temperature -  Risk of, Imbalanced  Goal: Complications related to the disease process, condition or treatment will be avoided or minimized  9/23/2021 0947 by Magaly Aburto RN  Outcome: Ongoing     Problem: Isolation Precautions - Risk of Spread of Infection  Goal: Prevent transmission of infection  9/23/2021 0947 by Inez Self RN  Outcome: Ongoing     Problem: Nutrition Deficits  Goal: Optimize nutritional status  Outcome: Ongoing     Problem: Risk for Fluid Volume Deficit  Goal: Maintain normal heart rhythm  Outcome: Ongoing     Problem: Risk for Fluid Volume Deficit  Goal: Maintain absence of muscle cramping  Outcome: Ongoing     Problem: Risk for Fluid Volume Deficit  Goal: Maintain normal serum potassium, sodium, calcium, phosphorus, and pH  Outcome: Ongoing     Problem: Loneliness or Risk for Loneliness  Goal: Demonstrate positive use of time alone when socialization is not possible  Outcome: Ongoing     Problem: Fatigue  Goal: Verbalize increase energy and improved vitality  Outcome: Ongoing     Problem: Patient Education: Go to Patient Education Activity  Goal: Patient/Family Education  Outcome: Ongoing     Problem: Skin Integrity:  Goal: Will show no infection signs and symptoms  Description: Will show no infection signs and symptoms  9/23/2021 0947 by Inez Self RN  Outcome: Ongoing     Problem: Skin Integrity:  Goal: Absence of new skin breakdown  Description: Absence of new skin breakdown  9/23/2021 0947 by Inez Self RN  Outcome: Ongoing     Problem: Nutrition  Goal: Optimal nutrition therapy  Outcome: Ongoing

## 2021-09-23 NOTE — PROGRESS NOTES
ICU Progress Note    Admit Date: 9/15/2021  Day: 9  Vent Day: Extubated (9/23)  IV Access:Peripheral, vas cath 7 days, CVC 7 days, left femoral cvc 7 days  IV Fluids:None  Vasopressors: Stopped (9/23)               Antibiotics: Zosyn   Diet: Diet NPO  ADULT TUBE FEEDING; Orogastric; Peptide Based; Continuous; 55; No; 30; Q 4 hours    CC: SOB    Interval history: No significant events noted overnight. Pt noted to be bradycardic again with HR as low as 37 to 39, but SBP > 110. Pt's FiO2 requirement remains constant with 40%, minute volume 5, peep 5. Pt had trial of breathing today, tolerated well. Pt was extubated, started on 15L HF NC, sedation discontinued, vasopressors were stopped.  Now that sedation has been stopped pt no longer bradycardic, HR now > 90 BPM.    Medications:     Scheduled Meds:   insulin glargine  15 Units SubCUTAneous Q24H    ipratropium-albuterol  1 ampule Inhalation Q4H WA    nicotine  1 patch TransDERmal Daily    insulin lispro  0-18 Units SubCUTAneous Q6H    dexamethasone  10 mg IntraVENous Q24H    famotidine (PEPCID) injection  20 mg IntraVENous Daily    piperacillin-tazobactam  3,375 mg IntraVENous Q8H    sodium chloride flush  5-40 mL IntraVENous 2 times per day    heparin (porcine)  5,000 Units SubCUTAneous 3 times per day     Continuous Infusions:   fentaNYL Stopped (09/23/21 1440)    dexmedetomidine (PRECEDEX) IV infusion 0.2 mcg/kg/hr (09/21/21 5097)    dextrose      propofol Stopped (09/23/21 1440)    norepinephrine Stopped (09/23/21 1440)    vasopressin (Septic Shock) infusion Stopped (09/17/21 1240)    sodium chloride       PRN Meds:perflutren lipid microspheres, sodium phosphate IVPB **OR** sodium phosphate IVPB **OR** sodium phosphate IVPB **OR** sodium phosphate IVPB, calcium gluconate **OR** calcium gluconate **OR** calcium gluconate **OR** calcium gluconate, glucose, dextrose, glucagon (rDNA), dextrose, sodium chloride flush, sodium chloride, ondansetron **OR** ondansetron, polyethylene glycol, acetaminophen **OR** acetaminophen    Objective:   Vitals:   T-max:  Patient Vitals for the past 8 hrs:   BP Temp Temp src Pulse Resp SpO2   09/23/21 1600 (!) 146/80 98.6 °F (37 °C) Oral 94 27 98 %   09/23/21 1530 -- -- -- 88 22 96 %   09/23/21 1515 -- -- -- 101 28 (!) 80 %   09/23/21 1500 (!) 171/79 -- -- 95 26 96 %   09/23/21 1445 -- -- -- 111 29 (!) 85 %   09/23/21 1430 -- -- -- 95 22 93 %   09/23/21 1415 -- -- -- 89 23 91 %   09/23/21 1400 133/68 -- -- 82 18 92 %   09/23/21 1345 -- -- -- 74 21 92 %   09/23/21 1330 -- -- -- 80 19 93 %   09/23/21 1315 -- -- -- 76 17 91 %   09/23/21 1300 (!) 153/123 -- -- 68 13 94 %   09/23/21 1257 -- -- -- -- 16 --   09/23/21 1250 -- -- -- (!) 48 23 96 %   09/23/21 1245 -- -- -- 51 24 94 %   09/23/21 1230 -- -- -- (!) 41 24 99 %   09/23/21 1215 -- -- -- (!) 40 24 99 %   09/23/21 1200 110/65 98.1 °F (36.7 °C) Axillary (!) 40 24 98 %   09/23/21 1145 -- -- -- (!) 41 24 98 %   09/23/21 1130 -- -- -- (!) 41 24 99 %   09/23/21 1115 -- -- -- (!) 40 24 98 %   09/23/21 1100 98/63 -- -- (!) 45 24 99 %   09/23/21 1045 -- -- -- (!) 45 24 98 %   09/23/21 1030 -- -- -- (!) 47 24 99 %   09/23/21 1015 -- -- -- (!) 40 24 100 %   09/23/21 1000 -- -- -- (!) 48 21 --   09/23/21 0945 -- -- -- (!) 45 15 99 %   09/23/21 0930 -- -- -- (!) 40 24 99 %   09/23/21 0915 -- -- -- (!) 38 24 99 %       Intake/Output Summary (Last 24 hours) at 9/23/2021 1714  Last data filed at 9/23/2021 1100  Gross per 24 hour   Intake 1892 ml   Output 2296 ml   Net -404 ml       Review of Systems   Unable to perform ROS: Intubated       Physical Exam  Constitutional:       Appearance: She is normal weight. She is ill-appearing and toxic-appearing. HENT:      Head: Normocephalic and atraumatic. Eyes:      Extraocular Movements: Extraocular movements intact. Comments: By observation   Cardiovascular:      Rate and Rhythm: Regular rhythm. Bradycardia present.       Heart sounds: No murmur heard.   No friction rub. No gallop. Pulmonary:      Breath sounds: Wheezing present. No rhonchi or rales. Abdominal:      General: There is no distension. Palpations: Abdomen is soft. There is no mass. Hernia: No hernia is present. LABS:    CBC:   Recent Labs     09/21/21  1109 09/21/21  1109 09/22/21  0413 09/22/21  1515 09/23/21  0456   WBC 12.1*  --  18.5*  --  15.6*   HGB 7.2*   < > 6.7* 8.8* 9.1*   HCT 22.8*   < > 21.3* 27.5* 28.0*     --  156  --  142   MCV 98.7  --  98.5  --  93.6    < > = values in this interval not displayed. Renal:    Recent Labs     09/21/21  0430 09/21/21  1513 09/22/21  0413 09/22/21  1600 09/23/21  0456 09/23/21  0553   *   < > 134* 132*  --  131*   K 4.4   < > 4.6 4.4  --  3.9   CL 99   < > 99 100  --  98*   CO2 22   < > 23 22  --  21   BUN 30*   < > 30* 29*  --  25*   CREATININE 1.9*   < > 1.8* 1.8*  --  1.8*   GLUCOSE 177*   < > 282* 300*  --  239*   CALCIUM 8.0*   < > 7.6* 7.4*  --  7.6*   MG 2.50*  --  2.50*  --  2.30  --    PHOS 2.8   < > 1.9* 2.4*  --  2.1*   ANIONGAP 12   < > 12 10  --  12    < > = values in this interval not displayed. Hepatic:   Recent Labs     09/22/21 0413 09/22/21  1600 09/23/21  0553   AST 23 38* 45*   ALT 29 37 55*   BILITOT 0.3 0.3 0.3   PROT 5.8* 5.5* 5.9*   LABALBU 2.7* 2.7* 2.8*   ALKPHOS 63 81 80     Troponin: No results for input(s): TROPONINI in the last 72 hours. BNP: No results for input(s): BNP in the last 72 hours. Lipids: No results for input(s): CHOL, HDL in the last 72 hours. Invalid input(s): LDLCALCU, TRIGLYCERIDE  ABGs:    Recent Labs     09/22/21  1131 09/23/21  0611 09/23/21  1403   PHART 7.373 7.377 7.314*   [id number] 41.8 44.3 49.2*   PO2ART 108.3* 96.2 64.4*   SVV5CYR 24.3 26 25.0   BEART -1 0.7 -1   F9EPSLIR 98 98 90*   KOP5JSE 26 27 27       INR: No results for input(s): INR in the last 72 hours.   Lactate: No results for input(s): LACTATE in the last 72 hours.  Cultures:  -----------------------------------------------------------------  RAD:   XR CHEST PORTABLE   Final Result      Coarse multifocal opacities most compatible with viral pneumonia. There is progression in the left lower lobe where there is obscuration of the left hemidiaphragm which is a new finding. Stable cardiac mediastinal silhouette. Lines and tubes without change. XR CHEST PORTABLE   Final Result   Worsening bilateral pulmonary infiltrates. Placement of endotracheal    tube as above. XR CHEST PORTABLE   Final Result   1. Right IJ line projects over the mid SVC. No pneumothorax. 2.  Unchanged multifocal airspace disease. XR CHEST PORTABLE   Final Result    Patchy bilateral airspace opacities greatest in the left lung base suggestive of multifocal pneumonia. Assessment/Plan:     Septic shock  Urosepsis  Covid PNA  COVID positive. Sepsis likely from pneumonia vs urosepsis. CXR (9/22) concern for secondary infection. Pt afebrile, WBC 15.6 decreased from the prior day. - Aspiration cultures  - WBC 15.6, decreased  - Thermal blanket  - continue Zosyn (9/16, day 8)     Acute hypoxic/hypercapnic respiratory failure 2/2 multifocal pneumonia, COVID-19  Pt's FiO2 was constant at 40%, PEEP 5, minute volume low at 5. Pt had spontaneous trial of breathing, tolerated well and was extubated    -Discontinued Propofol and fentanyl  -Discontinued mechanical ventilation   -15L HF NC   Decadron 10mg IV for 5 days (9/22 to 9/26)  -s/p decadron 20 mg I/V for five days (9/17 tp 9/21)  -s/p tocilizumab (9/16)     Anemia  Hgb 9.1 s/p pRBC transfusion on (9/22). Hgb much improved and stable.      - f/u on post transfusion Hgb and Hct  - 2u pRBCs  - Hgb 6.7, decreased   - CBC daily     Acute renal failure  History of CKD  Pt receiving CRRT.  Will f/u with nephrology tomorrow and see if they wish to consider CRRT, or use it at an as needed basis now that the pt is extubated.     - CRRT   - RFP daily  - Strict I/O  - Daily weights  - Nephrology folllowing     Hyperglycemia 2/2 to high dose steroids  Glucose remains elevated ranging from the mid to upper 200s.    - Will consider increasing basal insulin  - High dose sliding scale insulin  - hypoglycemia protocol  - POCT glucose checks    Code Status: Full code   FEN: Diet NPO  ADULT TUBE FEEDING; Orogastric; Peptide Based; Continuous; 55; No; 30; Q 4 hours  PPX: Heparin TID  DISPO: IP    Rita Caller, , PGY-1  09/23/21  5:14 PM    This patient has been staffed and discussed with Dr. Brendan Shrestha. Patient examined, findings as discussed with . We with assessment and plan. Management of critical illness required for hypoxic and hypercapnic respiratory failure. With ongoing treatment for pneumonia and COPD, oxygenation and ventilation improved. On spontaneous breathing trial today, she demonstrated adequate ventilation and gas exchange, and after appropriate monitoring was successfully extubated. On post extubation examination, she has mild tachypnea, alert mental status, no respiratory distress. Completing empiric antibiotic therapy for bacterial pneumonia. Renal failure remains problematic, but CRRT not apparently necessary. Fluid management should allow for use of standard HD rather than continuous dialysis. IV sedation is withdrawn. Fluid load from continuous NGT feeding also removed.   Discussed with   Time spent in critical care 50 minutes

## 2021-09-23 NOTE — PROGRESS NOTES
MECHANICAL VENTILATION WEANING PROTOCOL    PRE-TRIAL PATIENT ASSESSMENT - COMPLETED AT 1250    Ventilatory Assessment:    PARAMETER CRITERIA FOR WEANING   Spontaneous Cough:  Yes    Sputum Characteristics:  · Sputum Amount: Moderate  · Tenacity: Thick  · Sputum Color: Cloudy, Tan, Yellow SPONTANEOUS COUGH With small to moderate  Amount of secretions   FiO2 : 40 % FIO2 less than or equal to 50%     PEEP less than or equal to 8   Progressive Mobility Protocol  No     ABG:  Lab Results   Component Value Date    PHART 7.377 09/23/2021    VUU3GTJ 44.3 09/23/2021    PO2ART 96.2 09/23/2021    M4DVZZWN 98 09/23/2021    SYI7PJE 26 09/23/2021    BEART 0.7 09/23/2021     HGB/WBC:  Lab Results   Component Value Date    HGB 9.1 09/23/2021    WBC 15.6 09/23/2021        Vital Signs:    PARAMETER CRITERIA FOR WEANING Meets Criteria   Pulse: (!) 48 Within patient's normal limits / stable Yes   Resp: 16 Less than or equal to 30 Yes   BP: 110/65 Within patient's normal limits / minimal pressors (Hemodynamically Stable) Yes   SpO2: 96 % Greater than or equal to 90% Yes   End Tidal CO2: 32 (%) Within patient's normal limits Yes   Temp: 98.1 °F (36.7 °C) Less than 38. 5oC / 101. 3oF Yes     [x]    Based on this assessment and the [facility] Ventilator Weaning Protocol, this patient  IS being placed on a Spontaneous Breathing Trial (SBT) at this time.  []    Based on this assessment and the [facility] Ventilator Weaning Protocol, this patient  IS NOT being placed on a Spontaneous Breathing Trial (SBT) at this time. []    Patient  IS NOT being placed on a Spontaneous Breathing Trial (SBT) at this time because of factors not previously addressed.   Those factors include      Vital Capacity (VC) = 0.541    Maximum Inspiratory Force (MIF) = -14    SBT - Initiated at  1257    Ventilator Settings:  CPAP - 5 cmH2O, PS - 12 cmH2O(if using settings other than CPAP 5/PS 8, please explain reasons for settings here):  Per past SBT attempts, pt requires additional PS to maintain tidal volume    1 Minute SBT Respiratory Parameters:   VE: 5.2 L   RR: 16 b/m   VT: 158 mL (average VT = VE/RR)   RSBI: 101 (RR/VT in liters)    ETCO2: 34 cmH2O   SPO2: 93 %   If on sedation, amount and type 100 mcg/kg/hr fentanyl, 15mcg/kg/min propofol     [x]   RR is less than 35, RSBI is less than 100, patient's vitals signs are stable, and patient is in no apparent distress; therefore, patient is being left on SBT for up to 1 hour. []   RR is greater than 35, RSBI is greater than 100, VS's are unstable, or patient is in distress; therefore, patient is being placed back on previous settings. current RSBI 25. SBT - Concluded at  1444. Weaning Parameters/VS's at conclusion of SBT:   VE: 11.9 L   RR: 56 b/m   VT:567 mL (average VT = VE/RR)   RSBI: 42 (RR/VT in liters)   ETCO2: 27 cmH2O   SPO2: 9. %    If on sedation, amount and type none    [x]   Patient tolerated SBT for full 60 minutes with acceptable weaning parameters and vital signs and showed no signs or distress. []   Patient tolerated SBT for full 60 minutes, but had unacceptable weaning parameters or vital signs, and/or signs of distress. []   Patient was unable to tolerate SBT for 60 minutes and was placed back on previous settings.             COMMENTS:  Pt extubated

## 2021-09-23 NOTE — PROGRESS NOTES
Comprehensive Nutrition Assessment    RD did not conduct direct, in-person nutrition evaluation in efforts to reduce exposure and use of PPE for high risk persons, PUI persons, patients who have tested positive for Covid-19 or those awaiting respiratory panel results. EMR was screened for nutrition risk factors, as defined per nutrition standards of care. RECOMMENDATIONS:  1. PO Diet: Continue NPO while intubated. 2. Nutrition Support: Continue EN formula Peptide-Based  Vital AF 1.2 @ goal rate 55 ml/hr to provide 1320 ml total volume, 1584 kcal, 99 g protein and 1070 ml free water. 3. Modify water flush to maintenance of 30 ml every 4 hours. NUTRITION ASSESSMENT:   Type and Reason for Visit:  Type and Reason for Visit: Reassess   Nutritional summary & status: Pt tolerating EN @ goal. EN + propofol @ 11.4 mL/hr to provide 100% calorie needs. RD to decrease water flushes d/t low sodium. Pt continues on CRRT.      Patient admitted d/t SOB, chills - covid-19    PMH significant for: CKD, current smoker    MALNUTRITION ASSESSMENT  Context of Malnutrition: Acute Illness   Malnutrition Status: Insufficient data  Findings of the 6 clinical characteristics of malnutrition (Minimum of 2 out of 6 clinical characteristics is required to make the diagnosis of moderate or severe Protein Calorie Malnutrition based on AND/ASPEN Guidelines):  Energy Intake: Less than/equal to 50% of estimated energy requirements    Energy Intake Time: 3 days intubated/NPO   Weight Loss %: Unable to assess    Weight loss Time: Unable to assess     NUTRITION DIAGNOSIS   · Inadequate oral intake related to impaired respiratory function as evidenced by NPO or clear liquid status due to medical condition      [address] and/or Nutrient Delivery:  Continue NPO, Modify Tube Feeding  Nutrition Education/Counseling:  No recommendation at this time   Goals:  Pt will tolerate most appropriate form of nutrition to meet >75% of nutrition needs while intubated. Nutrition Monitoring and Evaluation:   Food/Nutrient Intake Outcomes:  Enteral Nutrition Intake/Tolerance  Physical Signs/Symptoms Outcomes:  Biochemical Data, Hemodynamic Status, Weight     OBJECTIVE DATA: Significant to nutrition assessment  · Nutrition-Focused Physical Findings: Nutrition Related Findings: lbm 9/19  · Labs: Reviewed; Na 131, Phos 2.1, POC Glu 249  · Meds: Reviewed; fentanyl, levo, propofol   · Wounds: Wound Type: None     CURRENT NUTRITION THERAPIES  Diet NPO  ADULT TUBE FEEDING; Orogastric; Peptide Based; Continuous; 20; Yes; 25; Q 4 hours; 55; 75; Q 4 hours   Current Tube Feeding (TF) Orders:  · Feeding Route: Orogastric  · Formula: Peptide Based  · Schedule: Continuous  · Additives/Modulars:    · Water Flushes: 75 mL q4h  · Current TF & Flush Orders Provides: @ goal  · Goal TF & Flush Orders Provides:  1320 ml total volume, 1584 kcal, 99 g protein and 1070 ml free water. PO Intake: Average Meal Intake: NPO   PO Supplement Intake:Average Supplements Intake: NPO  IVF: n/a     ANTHROPOMETRICS  Current Height: 5' 4\" (162.6 cm)  Current Weight: 142 lb 13.7 oz (64.8 kg)    Admission weight: 140 lb (63.5 kg)  Ideal Body Weight (lbs) (Calculated): 120 lbs (Ideal Body Weight (Kg) (Calculated): 55 kg  Usual Bodyweight SHELLEY - no wt hx   Weight Changes SHELLEY        BMI BMI (Calculated): 24.6    Wt Readings from Last 50 Encounters:   09/23/21 142 lb 13.7 oz (64.8 kg)       COMPARATIVE STANDARDS  Energy (kcal):  3788-3108 kcal/d (25-30 kcal/kg); Weight Used for Energy Requirements:  Current     Protein (g):  79-92 g/d (1.3-1.5 g/kg); Weight Used for Protein Requirements:  Current      Fluid (ml/day):  2102-1875 mL/d; Method Used for Fluid Requirements:  1 ml/kcal      The patient will still be monitored per nutrition standards of care. Consult dietitian if nutrition interventions essential to patient care is needed.      Cynda Kayser, RD, LD  [place]: [phone]  Office:  [phone]

## 2021-09-23 NOTE — PROGRESS NOTES
Dominga Muir : [phone]     Fax :[phone]        Renal  Note    Patient:  Justen Guerrero  MRN: [id number]    CHIEF COMPLAINT:  SOB, chills    History Obtained From:  electronic medical record    HISTORY OF PRESENT ILLNESS:   Justen Guerrero is a 73 y.o. female with pmhx of CKD, HTN, PUD, pre-diabetes, current smoker, who presented with 3 days of SOB and increased work of breathing. In the ED, patient tachypnic with accessory muscle use and hypoxic, SBP in 200s. COVID +ve (not vaccinated). He was initially placed on Bipap  But resp failure worsened, requirng intubation. Patient had lab work that was consistent with acute renal failure severe acidosis Broad spectrum abx were and Nitro gtt was started.  Nephorology was consulted for ARF management w/ CRRT.    09/22/21    Pt seen on CRRT twice   UF as rodger  Wean pressors   UO poor       Past Medical History:     OA (osteoarthritis)    Hypertension    Personal history of tobacco use, presenting hazards to health    Lower limb length difference    Current smoker    Renal and perinephric abscess    Acute renal failure (HCC)    Anemia, unspecified    Routine adult health maintenance    Peptic ulcer, unspecified site, unspecified as acute or chronic, without mention of hemorrhage, perforation, or obstruction    Right hip pain    DDD (degenerative disc disease), lumbosacral    External thrombosed hemorrhoids    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 Medications Prior to Admission:    Prior to Admission medications    Medication Sig Start Date End Date Taking? Authorizing Provider   albuterol sulfate  (90 Base) MCG/ACT inhaler  9/7/21   Historical Provider, MD   atenolol (TENORMIN) 25 MG tablet Take 25 mg by mouth daily    Historical Provider, MD   ibuprofen (ADVIL;MOTRIN) 600 MG tablet TAKE 1 TABLET BY MOUTH THREE TIMES A DAY WITH FOOD 8/2/21   Historical Provider, MD       Allergies:  Patient has no known allergies. Social History:   TOBACCO:   reports that she has been smoking cigarettes. She has been smoking about 0.50 packs per day. She has never used smokeless tobacco.  ETOH:   reports previous alcohol use. OCCUPATION:      Family History:   History reviewed. No pertinent family history. Physical Exam:    Vitals: /65   Pulse (!) 43   Temp 97.5 °F (36.4 °C) (Axillary)   Resp 24   Ht 5' 4\" (1.626 m)   Wt 137 lb 2 oz (62.2 kg)   SpO2 99%   BMI 23.54 kg/m²   Constitutional:  intubated  HEENT:  ETT in place  Neck: unable to assess JVD  Cardiovascular:  S1, S2 reg  Respiratory: symmetric lung expansion  Abdomen:  +BS, soft, ND  Ext: + lower extremity edema  Skin: dry/intact  CNS: sedated     CBC:   Recent Labs     09/21/21  0430 09/21/21  0430 09/21/21  1109 09/22/21  0413 09/22/21  1515   WBC 13.3*  --  12.1* 18.5*  --    HGB 7.7*   < > 7.2* 6.7* 8.8*     --  156 156  --     < > = values in this interval not displayed.      BMP:    Recent Labs     09/21/21  1513 09/22/21  0413 09/22/21  1600   * 134* 132*   K 4.9 4.6 4.4    99 100   CO2 24 23 22   BUN 28* 30* 29*   CREATININE 1.8* 1.8* 1.8*   GLUCOSE 206* 282* 300*     Hepatic:   Recent Labs     09/21/21  9997 09/22/21  0413 09/22/21  1600   AST 37 23 38*   ALT 34 29 37   BILITOT 0.3 0.3 0.3   ALKPHOS 63 63 81     Troponin:   No results for input(s): TROPONINI in the last 72 hours. BNP: No results for input(s): BNP in the last 72 hours. Lipids: No results for input(s): CHOL, HDL in the last 72 hours. Invalid input(s): LDLCALCU  ABGs:   Lab Results   Component Value Date    PHART 7.373 09/22/2021    PO2ART 108.3 09/22/2021    QPK4YGX 41.8 09/22/2021     INR: No results for input(s): INR in the last 72 hours. -----------------------------------------------------------------      Assessment and Plan   1. Acute Renal Failure   W/ ho CKD. Pt non complain    2. Acid/Base electrolyte abnormalities    3. Anemia    4. Covid-19 infection    Patient Active Problem List   Diagnosis Code    Acute renal failure ([facility] [address] N17.9    2019 novel coronavirus-infected pneumonia ([facility]) U07.1, J12.82    Acute respiratory failure with hypoxia ([facility]) J96.01    Septic shock ([facility]) A41.9, R65.21    Acute cystitis without hematuria N30.00    Pneumonia of left lower lobe due to infectious organism J18.9       Plan   - continue  CRRT    dose adjusted   Fluid removal:  UF as rodger   - Monitor labs per CRRT protocol  - >300 proteins in UA. Will check urine protein, urine creatinine  -  Kappa/Lambda ratio 2.15  - COVID -19 treatment per primary team      Daily assessment to switch to HD      Dose meds to GFR 25-30 on CRRT  Maintain SBP> 90 mmHg   Daily weights   AVOID NSAIDs  Avoid Nephrotoxins  Monitor Intake/Output  Call if significant decrease in urine output         Thank you for allowing us to participate in care of Mary Cruz MD  9/22/2021    [facility] [address]  Office : [phone]  Fax :[phone]

## 2021-09-23 NOTE — PROGRESS NOTES
Patient is admitted to the ICU; for complications of COVID. To minimize the exposure, and preserve the PPE patient was not seen physically. We will defer the management to intensive care unit team.  Once the patient is Covid negative/out of the ICU, hospitalists will assume care.     Please call with questions.     Ramakrishna Walter MD

## 2021-09-23 NOTE — PROGRESS NOTES
Patient has been successfully weaned from Mechanical Ventilation. RSBI before extubation was 42 with EtCO2 of 27 and SpO2 of 94 on 40% FiO2. Patient extubated and placed on 15 liters/min via high flow nasal cannula. Post extubation SpO2 is 75% with HR  109 bpm and RR 31 breaths/min. Patient had strong cough that was non-productive.   Extubation tolerated well by patient

## 2021-09-23 NOTE — PLAN OF CARE
Problem: Nutrition  Goal: Optimal nutrition therapy  9/23/2021 1028 by Sadia Mcmullen RD, LD  Outcome: Ongoing  Note: Nutrition Problem #1: Inadequate oral intake  Intervention: Food and/or Nutrient Delivery: Continue NPO, Modify Tube Feeding  Nutritional Goals: Pt will tolerate most appropriate form of nutrition to meet >75% of nutrition needs while intubated.     9/23/2021 0947 by Karla Bazzi RN  Outcome: Ongoing

## 2021-09-24 ENCOUNTER — APPOINTMENT (OUTPATIENT)
Dept: GENERAL RADIOLOGY | Age: 71
DRG: 870 | End: 2021-09-24
Payer: MEDICARE

## 2021-09-24 LAB
A/G RATIO: 0.8 (ref 1.1–2.2)
A/G RATIO: 0.8 (ref 1.1–2.2)
ALBUMIN SERPL-MCNC: 2.7 G/DL (ref 3.4–5)
ALBUMIN SERPL-MCNC: 2.8 G/DL (ref 3.4–5)
ALP BLD-CCNC: 80 U/L (ref 40–129)
ALP BLD-CCNC: 86 U/L (ref 40–129)
ALT SERPL-CCNC: 47 U/L (ref 10–40)
ALT SERPL-CCNC: 50 U/L (ref 10–40)
ANION GAP SERPL CALCULATED.3IONS-SCNC: 14 MMOL/L (ref 3–16)
ANION GAP SERPL CALCULATED.3IONS-SCNC: 16 MMOL/L (ref 3–16)
ANISOCYTOSIS: ABNORMAL
AST SERPL-CCNC: 21 U/L (ref 15–37)
AST SERPL-CCNC: 26 U/L (ref 15–37)
BASE EXCESS ARTERIAL: -1 MMOL/L (ref -3–3)
BASOPHILS ABSOLUTE: 0 K/UL (ref 0–0.2)
BASOPHILS RELATIVE PERCENT: 0 %
BILIRUB SERPL-MCNC: 0.3 MG/DL (ref 0–1)
BILIRUB SERPL-MCNC: <0.2 MG/DL (ref 0–1)
BUN BLDV-MCNC: 35 MG/DL (ref 7–20)
BUN BLDV-MCNC: 42 MG/DL (ref 7–20)
CALCIUM SERPL-MCNC: 8.2 MG/DL (ref 8.3–10.6)
CALCIUM SERPL-MCNC: 8.4 MG/DL (ref 8.3–10.6)
CARBOXYHEMOGLOBIN ARTERIAL: 1 % (ref 0–1.5)
CHLORIDE BLD-SCNC: 94 MMOL/L (ref 99–110)
CHLORIDE BLD-SCNC: 99 MMOL/L (ref 99–110)
CO2: 21 MMOL/L (ref 21–32)
CO2: 21 MMOL/L (ref 21–32)
CORTISOL TOTAL: 10.6 UG/DL
CREAT SERPL-MCNC: 3.1 MG/DL (ref 0.6–1.2)
CREAT SERPL-MCNC: 3.6 MG/DL (ref 0.6–1.2)
CULTURE, RESPIRATORY: NORMAL
EOSINOPHILS ABSOLUTE: 0 K/UL (ref 0–0.6)
EOSINOPHILS RELATIVE PERCENT: 0 %
GFR AFRICAN AMERICAN: 15
GFR AFRICAN AMERICAN: 18
GFR NON-AFRICAN AMERICAN: 13
GFR NON-AFRICAN AMERICAN: 15
GLOBULIN: 3.3 G/DL
GLOBULIN: 3.6 G/DL
GLUCOSE BLD-MCNC: 132 MG/DL (ref 70–99)
GLUCOSE BLD-MCNC: 143 MG/DL (ref 70–99)
GLUCOSE BLD-MCNC: 145 MG/DL (ref 70–99)
GLUCOSE BLD-MCNC: 156 MG/DL (ref 70–99)
GLUCOSE BLD-MCNC: 184 MG/DL (ref 70–99)
GLUCOSE BLD-MCNC: 197 MG/DL (ref 70–99)
GLUCOSE BLD-MCNC: 205 MG/DL (ref 70–99)
GLUCOSE BLD-MCNC: 209 MG/DL (ref 70–99)
GLUCOSE BLD-MCNC: 254 MG/DL (ref 70–99)
GLUCOSE BLD-MCNC: 33 MG/DL (ref 70–99)
GLUCOSE BLD-MCNC: 36 MG/DL (ref 70–99)
GLUCOSE BLD-MCNC: 44 MG/DL (ref 70–99)
GLUCOSE BLD-MCNC: 46 MG/DL (ref 70–99)
GLUCOSE BLD-MCNC: 52 MG/DL (ref 70–99)
GRAM STAIN RESULT: NORMAL
HCO3 ARTERIAL: 25 MMOL/L (ref 21–29)
HCT VFR BLD CALC: 29.3 % (ref 36–48)
HEMOGLOBIN, ART, EXTENDED: 9.7 G/DL
HEMOGLOBIN: 9.4 G/DL (ref 12–16)
HYPOCHROMIA: ABNORMAL
LYMPHOCYTES ABSOLUTE: 1.9 K/UL (ref 1–5.1)
LYMPHOCYTES RELATIVE PERCENT: 11 %
MACROCYTES: ABNORMAL
MAGNESIUM: 2.3 MG/DL (ref 1.8–2.4)
MCH RBC QN AUTO: 29.8 PG (ref 26–34)
MCHC RBC AUTO-ENTMCNC: 31.9 G/DL (ref 31–36)
MCV RBC AUTO: 93.4 FL (ref 80–100)
METHEMOGLOBIN ARTERIAL: 0.4 % (ref 0–1.4)
MONOCYTES ABSOLUTE: 0.9 K/UL (ref 0–1.3)
MONOCYTES RELATIVE PERCENT: 5 %
NEUTROPHILS ABSOLUTE: 14.4 K/UL (ref 1.7–7.7)
NEUTROPHILS RELATIVE PERCENT: 84 %
O2 SAT, ARTERIAL: 89 % (ref 93–100)
PCO2 ARTERIAL: 44.7 MMHG (ref 35–45)
PDW BLD-RTO: 17.9 % (ref 12.4–15.4)
PERFORMED ON: ABNORMAL
PH ARTERIAL: 7.35 (ref 7.35–7.45)
PHOSPHORUS: 3.3 MG/DL (ref 2.5–4.9)
PHOSPHORUS: 4.4 MG/DL (ref 2.5–4.9)
PLATELET # BLD: 144 K/UL (ref 135–450)
PMV BLD AUTO: 8.8 FL (ref 5–10.5)
PO2 ARTERIAL: 55.6 MMHG (ref 75–108)
POLYCHROMASIA: ABNORMAL
POTASSIUM SERPL-SCNC: 4 MMOL/L (ref 3.5–5.1)
POTASSIUM SERPL-SCNC: 4.5 MMOL/L (ref 3.5–5.1)
RBC # BLD: 3.14 M/UL (ref 4–5.2)
SCHISTOCYTES: ABNORMAL
SODIUM BLD-SCNC: 131 MMOL/L (ref 136–145)
SODIUM BLD-SCNC: 134 MMOL/L (ref 136–145)
TCO2 ARTERIAL: 26 MMOL/L
TEAR DROP CELLS: ABNORMAL
TOTAL PROTEIN: 6 G/DL (ref 6.4–8.2)
TOTAL PROTEIN: 6.4 G/DL (ref 6.4–8.2)
WBC # BLD: 17.1 K/UL (ref 4–11)

## 2021-09-24 PROCEDURE — 82803 BLOOD GASES ANY COMBINATION: CPT

## 2021-09-24 PROCEDURE — 2500000003 HC RX 250 WO HCPCS: Performed by: STUDENT IN AN ORGANIZED HEALTH CARE EDUCATION/TRAINING PROGRAM

## 2021-09-24 PROCEDURE — 2000000000 HC ICU R&B

## 2021-09-24 PROCEDURE — 6360000002 HC RX W HCPCS: Performed by: STUDENT IN AN ORGANIZED HEALTH CARE EDUCATION/TRAINING PROGRAM

## 2021-09-24 PROCEDURE — 2580000003 HC RX 258: Performed by: INTERNAL MEDICINE

## 2021-09-24 PROCEDURE — 6370000000 HC RX 637 (ALT 250 FOR IP): Performed by: STUDENT IN AN ORGANIZED HEALTH CARE EDUCATION/TRAINING PROGRAM

## 2021-09-24 PROCEDURE — 85025 COMPLETE CBC W/AUTO DIFF WBC: CPT

## 2021-09-24 PROCEDURE — 36592 COLLECT BLOOD FROM PICC: CPT

## 2021-09-24 PROCEDURE — 2580000003 HC RX 258: Performed by: STUDENT IN AN ORGANIZED HEALTH CARE EDUCATION/TRAINING PROGRAM

## 2021-09-24 PROCEDURE — 82533 TOTAL CORTISOL: CPT

## 2021-09-24 PROCEDURE — 6360000002 HC RX W HCPCS: Performed by: INTERNAL MEDICINE

## 2021-09-24 PROCEDURE — 2580000003 HC RX 258: Performed by: SURGERY

## 2021-09-24 PROCEDURE — 74018 RADEX ABDOMEN 1 VIEW: CPT

## 2021-09-24 PROCEDURE — 99233 SBSQ HOSP IP/OBS HIGH 50: CPT | Performed by: INTERNAL MEDICINE

## 2021-09-24 PROCEDURE — 83735 ASSAY OF MAGNESIUM: CPT

## 2021-09-24 PROCEDURE — 84100 ASSAY OF PHOSPHORUS: CPT

## 2021-09-24 PROCEDURE — 51798 US URINE CAPACITY MEASURE: CPT

## 2021-09-24 PROCEDURE — 92526 ORAL FUNCTION THERAPY: CPT

## 2021-09-24 PROCEDURE — 2500000003 HC RX 250 WO HCPCS: Performed by: SURGERY

## 2021-09-24 PROCEDURE — 92610 EVALUATE SWALLOWING FUNCTION: CPT

## 2021-09-24 PROCEDURE — 36415 COLL VENOUS BLD VENIPUNCTURE: CPT

## 2021-09-24 PROCEDURE — 6360000002 HC RX W HCPCS

## 2021-09-24 PROCEDURE — 2500000003 HC RX 250 WO HCPCS

## 2021-09-24 PROCEDURE — 94640 AIRWAY INHALATION TREATMENT: CPT

## 2021-09-24 PROCEDURE — 80053 COMPREHEN METABOLIC PANEL: CPT

## 2021-09-24 RX ORDER — INSULIN LISPRO 100 [IU]/ML
0-6 INJECTION, SOLUTION INTRAVENOUS; SUBCUTANEOUS
Status: DISCONTINUED | OUTPATIENT
Start: 2021-09-24 | End: 2021-10-05 | Stop reason: HOSPADM

## 2021-09-24 RX ORDER — DEXTROSE MONOHYDRATE 100 MG/ML
INJECTION, SOLUTION INTRAVENOUS CONTINUOUS
Status: DISCONTINUED | OUTPATIENT
Start: 2021-09-24 | End: 2021-09-25

## 2021-09-24 RX ORDER — INSULIN LISPRO 100 [IU]/ML
0-3 INJECTION, SOLUTION INTRAVENOUS; SUBCUTANEOUS NIGHTLY
Status: DISCONTINUED | OUTPATIENT
Start: 2021-09-24 | End: 2021-10-05 | Stop reason: HOSPADM

## 2021-09-24 RX ORDER — FUROSEMIDE 10 MG/ML
80 INJECTION INTRAMUSCULAR; INTRAVENOUS ONCE
Status: COMPLETED | OUTPATIENT
Start: 2021-09-24 | End: 2021-09-24

## 2021-09-24 RX ADMIN — DEXTROSE MONOHYDRATE 100 ML/HR: 50 INJECTION, SOLUTION INTRAVENOUS at 05:25

## 2021-09-24 RX ADMIN — INSULIN LISPRO 1 UNITS: 100 INJECTION, SOLUTION INTRAVENOUS; SUBCUTANEOUS at 17:41

## 2021-09-24 RX ADMIN — DEXTROSE MONOHYDRATE 12.5 G: 25 INJECTION, SOLUTION INTRAVENOUS at 05:29

## 2021-09-24 RX ADMIN — INSULIN LISPRO 1 UNITS: 100 INJECTION, SOLUTION INTRAVENOUS; SUBCUTANEOUS at 20:17

## 2021-09-24 RX ADMIN — HEPARIN SODIUM 5000 UNITS: 5000 INJECTION INTRAVENOUS; SUBCUTANEOUS at 22:07

## 2021-09-24 RX ADMIN — PIPERACILLIN AND TAZOBACTAM 3375 MG: 3; .375 INJECTION, POWDER, LYOPHILIZED, FOR SOLUTION INTRAVENOUS at 18:19

## 2021-09-24 RX ADMIN — DEXTROSE MONOHYDRATE 12.5 G: 25 INJECTION, SOLUTION INTRAVENOUS at 05:13

## 2021-09-24 RX ADMIN — LABETALOL HYDROCHLORIDE 10 MG: 5 INJECTION, SOLUTION INTRAVENOUS at 02:43

## 2021-09-24 RX ADMIN — FUROSEMIDE 80 MG: 10 INJECTION, SOLUTION INTRAMUSCULAR; INTRAVENOUS at 12:59

## 2021-09-24 RX ADMIN — DEXTROSE MONOHYDRATE 12.5 G: 25 INJECTION, SOLUTION INTRAVENOUS at 04:35

## 2021-09-24 RX ADMIN — IPRATROPIUM BROMIDE AND ALBUTEROL 1 PUFF: 20; 100 SPRAY, METERED RESPIRATORY (INHALATION) at 08:28

## 2021-09-24 RX ADMIN — Medication 10 ML: at 08:28

## 2021-09-24 RX ADMIN — HYDROMORPHONE HYDROCHLORIDE 0.25 MG: 1 INJECTION, SOLUTION INTRAMUSCULAR; INTRAVENOUS; SUBCUTANEOUS at 17:34

## 2021-09-24 RX ADMIN — DEXAMETHASONE SODIUM PHOSPHATE 10 MG: 4 INJECTION, SOLUTION INTRAMUSCULAR; INTRAVENOUS at 08:27

## 2021-09-24 RX ADMIN — HEPARIN SODIUM 5000 UNITS: 5000 INJECTION INTRAVENOUS; SUBCUTANEOUS at 14:30

## 2021-09-24 RX ADMIN — FAMOTIDINE 20 MG: 10 INJECTION, SOLUTION INTRAVENOUS at 08:28

## 2021-09-24 RX ADMIN — INSULIN LISPRO 1 UNITS: 100 INJECTION, SOLUTION INTRAVENOUS; SUBCUTANEOUS at 13:04

## 2021-09-24 RX ADMIN — Medication 10 ML: at 20:17

## 2021-09-24 RX ADMIN — PIPERACILLIN AND TAZOBACTAM 3375 MG: 3; .375 INJECTION, POWDER, LYOPHILIZED, FOR SOLUTION INTRAVENOUS at 06:27

## 2021-09-24 RX ADMIN — HEPARIN SODIUM 5000 UNITS: 5000 INJECTION INTRAVENOUS; SUBCUTANEOUS at 05:44

## 2021-09-24 RX ADMIN — INSULIN LISPRO 1 UNITS: 100 INJECTION, SOLUTION INTRAVENOUS; SUBCUTANEOUS at 08:34

## 2021-09-24 RX ADMIN — DEXTROSE MONOHYDRATE 12.5 G: 25 INJECTION, SOLUTION INTRAVENOUS at 05:43

## 2021-09-24 RX ADMIN — DEXTROSE MONOHYDRATE: 100 INJECTION, SOLUTION INTRAVENOUS at 06:22

## 2021-09-24 RX ADMIN — IPRATROPIUM BROMIDE AND ALBUTEROL 1 PUFF: 20; 100 SPRAY, METERED RESPIRATORY (INHALATION) at 20:55

## 2021-09-24 RX ADMIN — SODIUM CHLORIDE 0.2 MCG/KG/HR: 9 INJECTION, SOLUTION INTRAVENOUS at 12:59

## 2021-09-24 ASSESSMENT — PAIN SCALES - GENERAL
PAINLEVEL_OUTOF10: 0
PAINLEVEL_OUTOF10: 0
PAINLEVEL_OUTOF10: 9
PAINLEVEL_OUTOF10: 0
PAINLEVEL_OUTOF10: 0
PAINLEVEL_OUTOF10: 4
PAINLEVEL_OUTOF10: 0
PAINLEVEL_OUTOF10: 6

## 2021-09-24 ASSESSMENT — ENCOUNTER SYMPTOMS
NAUSEA: 0
ABDOMINAL PAIN: 1
BACK PAIN: 0
COUGH: 1
ABDOMINAL DISTENTION: 0
CONSTIPATION: 0
WHEEZING: 0
SHORTNESS OF BREATH: 1
DIARRHEA: 0
VOMITING: 0

## 2021-09-24 ASSESSMENT — PAIN DESCRIPTION - LOCATION
LOCATION: ABDOMEN
LOCATION: ABDOMEN

## 2021-09-24 ASSESSMENT — PAIN DESCRIPTION - PAIN TYPE
TYPE: ACUTE PAIN
TYPE: ACUTE PAIN

## 2021-09-24 NOTE — PROGRESS NOTES
Zosyn 3.375g IV q8h extended infusion ordered for patient. This medication is renally eliminated. Will change to Zosyn 3.375g IV q12h extended infusion per renal dose adjustment policy. Patient will be transitioned from CRRT to HD. Pharmacy will continue to monitor renal function and adjust dose as necessary. Please call with any questions. Thanks!   Martha Jackson PharmD, [facility]  Wireless: [phone]  9/24/2021 11:08 AM

## 2021-09-24 NOTE — CARE COORDINATION
Case Management Assessment           Daily Note                 Date/ Time of Note: 9/24/2021 2:50 PM         Note completed by: Ras Perez RN    Patient Name: Jessica Campos  YOB: 1950    Diagnosis:Metabolic acidosis [R90.9]  Acute renal failure (ARF) ([facility] [address] [N17.9]  Acute respiratory failure with hypoxia ([facility] [address] [J96.01]  Acute renal failure, unspecified acute renal failure type ([facility] [address] [N17.9]  Pneumonia of left lower lobe due to infectious organism [J18.9]  Suspected COVID-19 virus infection [Z20.822]  Patient Admission Status: Inpatient    Date of Admission:9/15/2021  8:00 PM Length of Stay: 9 GLOS: GMLOS: 12.4    Current Plan of Care: 4L O2, switch to HD, remains NPO, ECHO  ________________________________________________________________________________________  PT AM-PAC:   / 24 per last evaluation on: TBD    OT AM-PAC:   / 24 per last evaluation on: TBD    DME Needs for discharge: TBD  ________________________________________________________________________________________  Discharge Plan: To Be Determined DUE TO: currently on HD which is new, NPO-failed swallow eval    Tentative discharge date: TBD    Current barriers to discharge: not medically ready    Referrals completed: Not Applicable    Resources/ information provided: SNF List  ________________________________________________________________________________________  Case Management Notes:emailed SNF list of in-network facilities to Mari Alonzo at [email or web address] to review and also included facilities that can take COVID + patients. IF she is still on HD we may want to try for ARU if appropriate unless or O2 need increase as transport will be nearly impossible to coordinate. Isaac Shay and her family were provided with choice of provider; she and her family are in agreement with the discharge plan.     Care Transition Patient: Mariela Perez RN  [facility]  Case Management Department  Ph: [phone]  Fax: [phone]

## 2021-09-24 NOTE — PLAN OF CARE
Problem: Falls - Risk of:  Goal: Will remain free from falls  Description: Will remain free from falls  Outcome: Ongoing   Pt remains in bed/chair with alarm on, call light in reach. Problem: Non-Violent Restraints  Goal: Removal from restraints as soon as assessed to be safe  Outcome: Ongoing   Pt's restraints removed with no issue this shift.    Problem: Nutrition Deficits  Goal: Optimize nutritional status  Outcome: Ongoing   Pt seeing speech, hopeful to pass swallow and advance diet mario

## 2021-09-24 NOTE — PROGRESS NOTES
Patient is admitted to the ICU; for complications of COVID. To minimize the exposure, and preserve the PPE patient was not seen physically. We will defer the management to intensive care unit team.  Once the patient is Covid negative/out of the ICU, hospitalists will assume care.     Please call with questions.     Radha Taylor MD

## 2021-09-24 NOTE — PROGRESS NOTES
Speech Language Pathology  Facility/Department: [facility]   CLINICAL BEDSIDE SWALLOW EVALUATION/treat    NAME: David Hi  : 1950  MRN: [id number]    ADMISSION DATE: 9/15/2021  ADMITTING DIAGNOSIS: has Acute renal failure ([facility] [address] 2019 novel coronavirus-infected pneumonia ([facility]); Acute respiratory failure with hypoxia ([facility] [address] Septic shock ([facility] [address] Acute cystitis without hematuria; and Pneumonia of left lower lobe due to infectious organism on their problem list.  ONSET DATE: 9/15/21    Recent Chest Xray 21     Coarse multifocal opacities most compatible with viral pneumonia. There is progression in the left lower lobe where there is obscuration of the left hemidiaphragm which is a new finding.       Stable cardiac mediastinal silhouette.       Lines and tubes without change. Date of Eval: 2021  Evaluating Therapist: MICKI Gordillo    Current Diet level:  Current Diet : NPO  Current Liquid Diet : NPO    Primary Complaint  Patient Complaint: denies any problems with swallowing prior to admit    Pain:  Pain Assessment  Pain Assessment: none reported    Reason for Referral  David Hi was referred for a bedside swallow evaluation to assess the efficiency of her swallow function, identify signs and symptoms of aspiration and make recommendations regarding safe dietary consistencies, effective compensatory strategies, and safe eating environment. Impression  Pt was intubated 9/15-21. Pt has fairly strong voice and strong cough and is coughing up secretions. Pt oriented to person and place and followed simple commands. Oral structures grossly intact, pt with absent top dentition, limited bottom teeth. Pt analyzed with ice chips, water via tsp/cup and straw, puree and solid. Adequate labial seal with cup and straw, no anterior loss. Prolonged mastication with solid, due to limited dentition and fatigue.  Initially pt tolerated all with no overt signs of aspiration, with good swallow movement felt upon palpation of anterior neck. However as trials progressed, RR increased from lower 20's to 30. Pt began exhibiting coughing and required suctioning of secretions with yankauer. Discontinued further PO trials due to concern for pt safety  Recommend aggressive oral care with suction toothbrush, and ice chips/tsps of water for comfort and plan to re-assess next session    Dysphagia Diagnosis: Suspected needs further assessment  Dysphagia Outcome Severity Scale: Level 2: Moderate Severe dysphagia- Maximum assistance or maximum use of strategies with partial PO only     Treatment Plan  Requires SLP Intervention: Yes  Duration/Frequency of Treatment: 3-5 x  D/C Recommendations: To be determined    Recommended Diet and Intervention  Diet Solids Recommendation: NPO  Liquid Consistency Recommendation: NPO     Therapeutic Interventions: Oral care; Patient/Family education    Compensatory Swallowing Strategies  n/a    Treatment/Goals  1-The patient/caregiver will demonstrate understanding of compensatory strategies for improved swallowing safety  9/24; Educated pt to purpose of visit, s/s of aspiration, concern if aspiration occurs, explanation as to how intubation can affect swallowing and rationale for NPO, oral care and ice chips. Pt will require cont education /reinforcement  Cont goal     2- The patient will tolerate repeat BSE when able. General  Chart Reviewed: Yes  Behavior/Cognition: Alert; Cooperative  O2 Device: Nasal cannula  Communication Observation: Functional  Follows Directions: Simple  Dentition:  (limited bottom dentition, absent top)  Patient Positioning: Upright in bed  Baseline Vocal Quality: Normal  Volitional Cough: Strong  Prior Dysphagia History: none  Consistencies Administered: Reg solid; Dysphagia Pureed (Dysphagia I); Thin - teaspoon; Thin - cup; Thin - straw; Ice Chips    Vision/Hearing  Vision  Vision: Within Functional Limits  Hearing  Hearing: Within functional limits    Oral Motor Deficits  Oral/Motor  Oral Motor: Within functional limits    Oral Phase Dysfunction  Adequate labial seal with cup and straw, no anterior loss. Prolonged mastication with solid, due to limited dentition and fatigue. Indicators of Pharyngeal Phase Dysfunction  Pt analyzed with ice chips, water via tsp/cup and straw, puree and solid. Initially pt tolerated all with no overt signs of aspiration, with good swallow movement felt upon palpation of anterior neck. However as trials progressed, RR increased from lower 20's to 30. Pt began exhibiting coughing and required suctioning of secretions with yankauer. Prognosis  Prognosis  Prognosis for safe diet advancement: fair (length of time intubated)  Individuals consulted  Consulted and agree with results and recommendations: Patient;RN    Education  Patient Education: Pt educated to purpose of visit  Patient Education Response: Needs reinforcement  Safety Devices in place: Yes  Type of devices: Call light within reach       Therapy Time  SLP Individual Minutes  Time In: 0908  Time Out: 0932  Minutes: 24    Plan:  · Recommended diet: NPO  · Oral care / small amounts of ice chips if alert enough for the comfort of pt, health and hydration of oropharyngeal mucosa and swallow stimulation  · Re-assessment bedside 9/25, as pt appropriate  Dc recommendation: TBD  Pt therapy goal: I want ice, I eat ice all of the time  Pt dc goal: not appropriately stated  Darryn Camp M.S./[facility]-SLP #[id number]  Pg.  # [id number]  Needs met prior to leaving room, call light within reach, d/w RN  This document will serve as a dc summary if pt dc prior to next visit   9/24/2021 10:17 AM

## 2021-09-24 NOTE — PROGRESS NOTES
Ryan Many : [phone]     Fax :[phone]        Renal  Note    Patient:  Kristen Golden  MRN: [id number]    CHIEF COMPLAINT:  SOB, chills    History Obtained From:  electronic medical record      09/24/21    Off pressors   UO poor  - 0 documented      Past Medical History:     OA (osteoarthritis)    Hypertension    Personal history of tobacco use, presenting hazards to health    Lower limb length difference    Current smoker    Renal and perinephric abscess    Acute renal failure (HCC)    Anemia, unspecified    Routine adult health maintenance    Peptic ulcer, unspecified site, unspecified as acute or chronic, without mention of hemorrhage, perforation, or obstruction    Right hip pain    DDD (degenerative disc disease), lumbosacral    External thrombosed hemorrhoids    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 Medications Prior to Admission:    Prior to Admission medications    Medication Sig Start Date End Date Taking?  Authorizing Provider   albuterol sulfate  (90 Base) MCG/ACT inhaler  9/7/21   Historical Provider, MD   atenolol (TENORMIN) 25 MG tablet Take 25 mg by mouth daily    Historical Provider, MD   ibuprofen (ADVIL;MOTRIN) 600 MG tablet TAKE 1 TABLET BY MOUTH THREE TIMES A DAY WITH FOOD 8/2/21   Historical Provider, MD       Allergies:  Patient has no known allergies. Social History:   TOBACCO:   reports that she has been smoking cigarettes. She has been smoking about 0.50 packs per day. She has never used smokeless tobacco.  ETOH:   reports previous alcohol use. OCCUPATION:      Family History:   History reviewed. No pertinent family history. Physical Exam:    Vitals: BP (!) 157/77   Pulse 77   Temp 97.5 °F (36.4 °C) (Temporal)   Resp 25   Ht 5' 4\" (1.626 m)   Wt 145 lb 11.9 oz (66.1 kg)   SpO2 97%   BMI 25.02 kg/m²   Constitutional:  On 4L NC  HEENT:  ETT in place  Neck: unable to assess JVD  Cardiovascular:  S1, S2 reg  Respiratory: symmetric lung expansion  Abdomen:  +BS, soft, ND  Ext: + lower extremity edema  Skin: dry/intact  CNS: sedated     CBC:   Recent Labs     09/22/21  0413 09/22/21  0413 09/22/21  1515 09/23/21  0456 09/24/21  0430   WBC 18.5*  --   --  15.6* 17.1*   HGB 6.7*   < > 8.8* 9.1* 9.4*     --   --  142 144    < > = values in this interval not displayed. BMP:    Recent Labs     09/23/21  0553 09/23/21  1600 09/24/21  0430   * 130* 134*   K 3.9 4.5 4.0   CL 98* 95* 99   CO2 21 23 21   BUN 25* 28* 35*   CREATININE 1.8* 2.2* 3.1*   GLUCOSE 239* 314* 52*     Hepatic:   Recent Labs     09/23/21  0553 09/23/21  1600 09/24/21  0430   AST 45* 42* 26   ALT 55* 72* 50*   BILITOT 0.3 0.3 <0.2   ALKPHOS 80 89 80     Troponin:   No results for input(s): TROPONINI in the last 72 hours. BNP: No results for input(s): BNP in the last 72 hours. Lipids: No results for input(s): CHOL, HDL in the last 72 hours. Invalid input(s): LDLCALCU  ABGs:   Lab Results   Component Value Date    PHART 7.351 09/24/2021    PO2ART 55.6 09/24/2021    KTV7OBM 44.7 09/24/2021     INR: No results for input(s): INR in the last 72 hours.   -----------------------------------------------------------------      Assessment and Plan   1. Acute Renal Failure   W/ ho CKD. Pt non compliant    2. Acid/Base electrolyte abnormalities    3. Anemia    4. Covid-19 infection    Patient Active Problem List   Diagnosis Code    Acute renal failure ([facility] [address] N17.9    2019 novel coronavirus-infected pneumonia ([facility]) U07.1, J12.82    Acute respiratory failure with hypoxia (HCC) J96.01    Septic shock ([facility]) A41.9, R65.21    Acute cystitis without hematuria N30.00    Pneumonia of left lower lobe due to infectious organism J18.9       Plan   -  Stop CRRT yesteday  - COVID -19 treatment per primary team  - Switch to HD      Maintain SBP> 90 mmHg   Daily weights   AVOID NSAIDs  Avoid Nephrotoxins  Monitor Intake/Output  Call if significant decrease in urine output         Thank you for allowing us to participate in care of [facility]  Gladys Hernandez     Will discuss with attending physician Dr. Federica Regan, DO PGY-2  9/24/2021    [facility] [address] [place] [address] [place]  Office : [phone]  Fax :[phone]

## 2021-09-24 NOTE — PROGRESS NOTES
Anyi Patron : [phone]     Fax :[phone]        Renal  Note    Patient:  Charley Arellano  MRN: [id number]    CHIEF COMPLAINT:  SOB, chills    History Obtained From:  electronic medical record    HISTORY OF PRESENT ILLNESS:   Charley Arellano is a 73 y.o. female with pmhx of CKD, HTN, PUD, pre-diabetes, current smoker, who presented with 3 days of SOB and increased work of breathing. In the ED, patient tachypnic with accessory muscle use and hypoxic, SBP in 200s. COVID +ve (not vaccinated). He was initially placed on Bipap  But resp failure worsened, requirng intubation. Patient had lab work that was consistent with acute renal failure severe acidosis Broad spectrum abx were and Nitro gtt was started.  Nephorology was consulted for ARF management w/ CRRT.    09/23/21    Pt seen on CRRT   UF as rodger  Off pressors   UO poor       Past Medical History:     OA (osteoarthritis)    Hypertension    Personal history of tobacco use, presenting hazards to health    Lower limb length difference    Current smoker    Renal and perinephric abscess    Acute renal failure (HCC)    Anemia, unspecified    Routine adult health maintenance    Peptic ulcer, unspecified site, unspecified as acute or chronic, without mention of hemorrhage, perforation, or obstruction    Right hip pain    DDD (degenerative disc disease), lumbosacral    External thrombosed hemorrhoids    Female proctocele without uterine prolapse    Complete uterine prolapse    Alteration in bowel elimination: incontinence    Female genuine stress incontinence    Gastric ulcer    Pneumonia    Cervical prolapse  Pre-diabetes     Past Surgical History:    She has past surgical history that includes Gallbladder surgery (1992); Esophagogastroduodenoscopy (N/A, 7/1/2014); hip dislocation 1970(?); and Vaginal hysterectomy (N/A, 1/13/2016). Medications Prior to Admission:    Prior to Admission medications    Medication Sig Start Date End Date Taking? Authorizing Provider   albuterol sulfate  (90 Base) MCG/ACT inhaler  9/7/21   Historical Provider, MD   atenolol (TENORMIN) 25 MG tablet Take 25 mg by mouth daily    Historical Provider, MD   ibuprofen (ADVIL;MOTRIN) 600 MG tablet TAKE 1 TABLET BY MOUTH THREE TIMES A DAY WITH FOOD 8/2/21   Historical Provider, MD       Allergies:  Patient has no known allergies. Social History:   TOBACCO:   reports that she has been smoking cigarettes. She has been smoking about 0.50 packs per day. She has never used smokeless tobacco.  ETOH:   reports previous alcohol use. OCCUPATION:      Family History:   History reviewed. No pertinent family history. Physical Exam:    Vitals: BP (!) 144/71   Pulse 85   Temp 98.3 °F (36.8 °C) (Oral)   Resp 18   Ht 5' 4\" (1.626 m)   Wt 142 lb 13.7 oz (64.8 kg)   SpO2 96%   BMI 24.52 kg/m²   Constitutional:  intubated  HEENT:  ETT in place  Neck: unable to assess JVD  Cardiovascular:  S1, S2 reg  Respiratory: symmetric lung expansion  Abdomen:  +BS, soft, ND  Ext: + lower extremity edema  Skin: dry/intact  CNS: sedated     CBC:   Recent Labs     09/21/21  1109 09/21/21  1109 09/22/21  0413 09/22/21  1515 09/23/21  0456   WBC 12.1*  --  18.5*  --  15.6*   HGB 7.2*   < > 6.7* 8.8* 9.1*     --  156  --  142    < > = values in this interval not displayed.      BMP:    Recent Labs     09/22/21  1600 09/23/21  0553 09/23/21  1600   * 131* 130*   K 4.4 3.9 4.5    98* 95*   CO2 22 21 23   BUN 29* 25* 28*   CREATININE 1.8* 1.8* 2.2*   GLUCOSE 300* 239* 314*     Hepatic:   Recent Labs     09/22/21  1600 09/23/21  0540 09/23/21  1600   AST 38* 45* 42*   ALT 37 55* 72*   BILITOT 0.3 0.3 0.3   ALKPHOS 81 80 89     Troponin:   No results for input(s): TROPONINI in the last 72 hours. BNP: No results for input(s): BNP in the last 72 hours. Lipids: No results for input(s): CHOL, HDL in the last 72 hours. Invalid input(s): LDLCALCU  ABGs:   Lab Results   Component Value Date    PHART 7.314 09/23/2021    PO2ART 64.4 09/23/2021    TAA1TLF 49.2 09/23/2021     INR: No results for input(s): INR in the last 72 hours. -----------------------------------------------------------------      Assessment and Plan   1. Acute Renal Failure   W/ ho CKD. Pt non complain    2. Acid/Base electrolyte abnormalities    3. Anemia    4. Covid-19 infection    Patient Active Problem List   Diagnosis Code    Acute renal failure ([facility] [address] N17.9    2019 novel coronavirus-infected pneumonia ([facility]) U07.1, J12.82    Acute respiratory failure with hypoxia ([facility]) J96.01    Septic shock ([facility]) A41.9, R65.21    Acute cystitis without hematuria N30.00    Pneumonia of left lower lobe due to infectious organism J18.9       Plan   -  Stop  CRRT today     dose adjusted   Fluid removal:  UF as rodger   - Monitor labs per CRRT protocol  - >300 proteins in UA. Will check urine protein, urine creatinine  -  Kappa/Lambda ratio 2.15  - COVID -19 treatment per primary team      switch to HD      Dose meds to GFR 25-30 on CRRT  Maintain SBP> 90 mmHg   Daily weights   AVOID NSAIDs  Avoid Nephrotoxins  Monitor Intake/Output  Call if significant decrease in urine output         Thank you for allowing us to participate in care of Mary Tay MD  9/23/2021    [facility]  Office : [phone]  Fax :[phone]

## 2021-09-24 NOTE — PROGRESS NOTES
ICU Progress Note    Admit Date: 9/15/2021  Day: 10  Vent Day: Extubated (9/23)  IV Access:Peripheral, vas cath 8 days, CVC 8 days, left femoral cvc 8 days  IV Fluids:None  Vasopressors: Stopped (9/23)               Antibiotics: Zosyn   Diet: Diet NPO    CC: SOB    Interval history: Pt's blood sugar low over night, with values ranging between 46 and 33. Pt also needed precedex and PRN morphine for agitation. Pt is day one s/p extubation, spO2 has ranged in the upper 90s on 4L NC. Pt no longer bradycardic since sedation has been discontinued, and her SBP has been elevated.      Medications:     Scheduled Meds:   insulin lispro  0-6 Units SubCUTAneous TID WC    insulin lispro  0-3 Units SubCUTAneous Nightly    piperacillin-tazobactam  3,375 mg IntraVENous Q12H    nicotine  1 patch TransDERmal Daily    albuterol-ipratropium  1 puff Inhalation BID    dexamethasone  10 mg IntraVENous Q24H    famotidine (PEPCID) injection  20 mg IntraVENous Daily    sodium chloride flush  5-40 mL IntraVENous 2 times per day    heparin (porcine)  5,000 Units SubCUTAneous 3 times per day     Continuous Infusions:   dextrose Stopped (09/24/21 0900)    dexmedetomidine (PRECEDEX) IV infusion Stopped (09/24/21 1300)    dextrose 100 mL/hr (09/24/21 0528)    sodium chloride       PRN Meds:perflutren lipid microspheres, labetalol, OLANZapine, hydrALAZINE, glucose, dextrose, glucagon (rDNA), dextrose, sodium chloride flush, sodium chloride, ondansetron **OR** ondansetron, polyethylene glycol, acetaminophen **OR** acetaminophen    Objective:   Vitals:   T-max:  Patient Vitals for the past 8 hrs:   BP Temp Temp src Pulse Resp SpO2   09/24/21 1200 (!) 169/115 97.4 °F (36.3 °C) Temporal 91 27 95 %   09/24/21 1100 (!) 151/83 -- -- 92 (!) 35 95 %   09/24/21 1000 (!) 192/101 -- -- 89 (!) 36 (!) 86 %   09/24/21 0900 (!) 157/77 -- -- 77 25 97 %   09/24/21 0800 121/79 97.5 °F (36.4 °C) Temporal 78 (!) 33 93 %   09/24/21 0700 130/63 -- -- 81 (!) 31 94 %   09/24/21 0600 -- -- -- 78 (!) 34 92 %       Intake/Output Summary (Last 24 hours) at 9/24/2021 1332  Last data filed at 9/24/2021 1100  Gross per 24 hour   Intake 4127.93 ml   Output 800 ml   Net 3327.93 ml       Review of Systems   Constitutional: Negative for fever. Eyes: Negative for visual disturbance. Respiratory: Positive for cough and shortness of breath. Negative for wheezing. Cardiovascular: Negative for chest pain and leg swelling. Gastrointestinal: Positive for abdominal pain. Negative for abdominal distention, constipation, diarrhea, nausea and vomiting. Abdominal pain in RUQ   Musculoskeletal: Negative for arthralgias, back pain and myalgias. Neurological: Positive for headaches. Negative for weakness. Physical Exam  Constitutional:       Appearance: She is normal weight. She is ill-appearing and toxic-appearing. HENT:      Head: Normocephalic and atraumatic. Eyes:      Extraocular Movements: Extraocular movements intact. Comments: By observation   Cardiovascular:      Rate and Rhythm: Normal rate and regular rhythm. Heart sounds: No murmur heard. No friction rub. No gallop. Pulmonary:      Breath sounds: Wheezing present. No rhonchi or rales. Abdominal:      General: There is no distension. Palpations: Abdomen is soft. Tenderness: There is abdominal tenderness. There is no guarding or rebound. Musculoskeletal:         General: No swelling or tenderness. Right lower leg: No edema. Left lower leg: No edema. Neurological:      Mental Status: She is oriented to person, place, and time.       Comments: Pt is orientated to self, place, situation, year, but states that the month is October           LABS:    CBC:   Recent Labs     09/22/21  0413 09/22/21  0413 09/22/21  1515 09/23/21  0456 09/24/21  0430   WBC 18.5*  --   --  15.6* 17.1*   HGB 6.7*   < > 8.8* 9.1* 9.4*   HCT 21.3*   < > 27.5* 28.0* 29.3*     --   --  142 144 MCV 98.5  --   --  93.6 93.4    < > = values in this interval not displayed. Renal:    Recent Labs     09/22/21  0413 09/22/21 1600 09/23/21  0456 09/23/21  0553 09/23/21 1600 09/24/21  0430   *   < >  --  131* 130* 134*   K 4.6   < >  --  3.9 4.5 4.0   CL 99   < >  --  98* 95* 99   CO2 23   < >  --  21 23 21   BUN 30*   < >  --  25* 28* 35*   CREATININE 1.8*   < >  --  1.8* 2.2* 3.1*   GLUCOSE 282*   < >  --  239* 314* 52*   CALCIUM 7.6*   < >  --  7.6* 8.0* 8.2*   MG 2.50*  --  2.30  --   --  2.30   PHOS 1.9*   < >  --  2.1* 3.2 3.3   ANIONGAP 12   < >  --  12 12 14    < > = values in this interval not displayed. Hepatic:   Recent Labs     09/23/21  0553 09/23/21 1600 09/24/21  0430   AST 45* 42* 26   ALT 55* 72* 50*   BILITOT 0.3 0.3 <0.2   PROT 5.9* 6.4 6.0*   LABALBU 2.8* 3.1* 2.7*   ALKPHOS 80 89 80     Troponin: No results for input(s): TROPONINI in the last 72 hours. BNP: No results for input(s): BNP in the last 72 hours. Lipids: No results for input(s): CHOL, HDL in the last 72 hours. Invalid input(s): LDLCALCU, TRIGLYCERIDE  ABGs:    Recent Labs     09/23/21  0611 09/23/21  1403 09/24/21  0430   PHART 7.377 7.314* 7.351   HPV4IXL 44.3 49.2* 44.7   PO2ART 96.2 64.4* 55.6*   UXM1FPB 26 25.0 25   BEART 0.7 -1 -1.0   A9FCISNO 98 90* 89*   RMW8CYS 27 27 26       INR: No results for input(s): INR in the last 72 hours. Lactate: No results for input(s): LACTATE in the last 72 hours. Cultures:  -----------------------------------------------------------------  RAD:   XR CHEST PORTABLE   Final Result      Coarse multifocal opacities most compatible with viral pneumonia. There is progression in the left lower lobe where there is obscuration of the left hemidiaphragm which is a new finding. Stable cardiac mediastinal silhouette. Lines and tubes without change. XR CHEST PORTABLE   Final Result   Worsening bilateral pulmonary infiltrates.   Placement of endotracheal    tube as above.          XR CHEST PORTABLE   Final Result   1. Right IJ line projects over the mid SVC. No pneumothorax. 2.  Unchanged multifocal airspace disease. XR CHEST PORTABLE   Final Result    Patchy bilateral airspace opacities greatest in the left lung base suggestive of multifocal pneumonia. Assessment/Plan:     Septic shock, resolved   Urosepsis  Covid PNA  COVID positive. Sepsis likely from covid pneumonia vs urosepsis. CXR (9/22) concern for secondary infection, though pt remains afebrile and WBC 17.1, increased from prior day. Possible WBC fluctuation could be due to high dose steroids. Organisms not appreciated on respiratory cultures. Estimated SOFA score 2. Pt's sepsis resolving.     -Respiratory cultures negative  -continue Zosyn (9/16, day 8)     Acute hypoxic/hypercapnic respiratory failure 2/2 multifocal pneumonia, COVID-19  Pt is s/p extubation day 1, tolerating well with spO2 mostly in the high 90s. Oxyhemoglobin saturation % on O2 at 4 L/min  Arterial pH 7.35, PCO2 44.7, PO2 55.6  -Decadron 10mg IV for 5 days (9/22 to 9/26)  -s/p decadron 20 mg I/V for five days (9/17 tp 9/21)  -s/p tocilizumab (9/16)    HTN   Pt has elevated SBP bt/ 180 to 229 on A-Line reading. Pt has PRN hydralazine and PRN labetalol available.    -Labetalol 10 mg IV PRN q6H  -Hydralazine 10 mg IV q6H    Anemia  Hgb 9.4, remains stable. Likely chronic condition, due to pt's CKD. - CBC daily     Acute renal failure  CKD  CRRT discontinued. She is making some urine. Nephrology ordered lasix and periwick catheter, will monitor UO. Pt may need HD if UO remains low with administration of lasix. - Discontinued CRRT   - Lasix 80 mg IV one time  - RFP daily  - Strict I/O  - Daily weights  - Nephrology folllowing     Hyperglycemia 2/2 to high dose steroids  Pt hypoglycemic overnight, values between 46 to 33.  Lantus discontinued, pt changed to LDSSI     -Discontinued Lantus  -LDSSI  -hypoglycemia protocol  -POCT glucose checks    Code Status: Full code   FEN: Diet NPO  PPX: Heparin TID  DISPO: IP    Para Duverney, DO, PGY-1  09/24/21  1:32 PM  Patient examined, findings as discussed with Dr. Fabio Pringle. Agree with assessment and plan. Respiratory failure improving, maintaining normal ventilation, and gas exchange is compensated with low flow O2. Will complete empiric therapy for bacterial pneumonia. Her clinical course is certainly more consistent with this than with Covid infection. CHRISTOPHER may be improving, she is making urine. Continue to manage diabetes with as needed insulin until her dietary intake is reliable.

## 2021-09-25 LAB
A/G RATIO: 0.7 (ref 1.1–2.2)
A/G RATIO: 0.8 (ref 1.1–2.2)
ALBUMIN SERPL-MCNC: 2.6 G/DL (ref 3.4–5)
ALBUMIN SERPL-MCNC: 2.7 G/DL (ref 3.4–5)
ALP BLD-CCNC: 61 U/L (ref 40–129)
ALP BLD-CCNC: 74 U/L (ref 40–129)
ALT SERPL-CCNC: 32 U/L (ref 10–40)
ALT SERPL-CCNC: 34 U/L (ref 10–40)
ANION GAP SERPL CALCULATED.3IONS-SCNC: 16 MMOL/L (ref 3–16)
ANION GAP SERPL CALCULATED.3IONS-SCNC: 18 MMOL/L (ref 3–16)
ANISOCYTOSIS: ABNORMAL
AST SERPL-CCNC: 14 U/L (ref 15–37)
AST SERPL-CCNC: 15 U/L (ref 15–37)
BASOPHILS ABSOLUTE: 0 K/UL (ref 0–0.2)
BASOPHILS RELATIVE PERCENT: 0 %
BILIRUB SERPL-MCNC: <0.2 MG/DL (ref 0–1)
BILIRUB SERPL-MCNC: <0.2 MG/DL (ref 0–1)
BUN BLDV-MCNC: 47 MG/DL (ref 7–20)
BUN BLDV-MCNC: 55 MG/DL (ref 7–20)
CALCIUM SERPL-MCNC: 8.1 MG/DL (ref 8.3–10.6)
CALCIUM SERPL-MCNC: 8.2 MG/DL (ref 8.3–10.6)
CHLORIDE BLD-SCNC: 97 MMOL/L (ref 99–110)
CHLORIDE BLD-SCNC: 98 MMOL/L (ref 99–110)
CO2: 19 MMOL/L (ref 21–32)
CO2: 22 MMOL/L (ref 21–32)
CREAT SERPL-MCNC: 4.8 MG/DL (ref 0.6–1.2)
CREAT SERPL-MCNC: 5 MG/DL (ref 0.6–1.2)
EOSINOPHILS ABSOLUTE: 0 K/UL (ref 0–0.6)
EOSINOPHILS RELATIVE PERCENT: 0 %
GFR AFRICAN AMERICAN: 10
GFR AFRICAN AMERICAN: 11
GFR NON-AFRICAN AMERICAN: 9
GFR NON-AFRICAN AMERICAN: 9
GLOBULIN: 3.2 G/DL
GLOBULIN: 3.8 G/DL
GLUCOSE BLD-MCNC: 119 MG/DL (ref 70–99)
GLUCOSE BLD-MCNC: 120 MG/DL (ref 70–99)
GLUCOSE BLD-MCNC: 134 MG/DL (ref 70–99)
GLUCOSE BLD-MCNC: 174 MG/DL (ref 70–99)
GLUCOSE BLD-MCNC: 205 MG/DL (ref 70–99)
GLUCOSE BLD-MCNC: 67 MG/DL (ref 70–99)
GLUCOSE BLD-MCNC: 68 MG/DL (ref 70–99)
HCT VFR BLD CALC: 27.4 % (ref 36–48)
HEMOGLOBIN: 8.9 G/DL (ref 12–16)
LYMPHOCYTES ABSOLUTE: 1.6 K/UL (ref 1–5.1)
LYMPHOCYTES RELATIVE PERCENT: 12 %
MAGNESIUM: 2.4 MG/DL (ref 1.8–2.4)
MCH RBC QN AUTO: 30.5 PG (ref 26–34)
MCHC RBC AUTO-ENTMCNC: 32.6 G/DL (ref 31–36)
MCV RBC AUTO: 93.5 FL (ref 80–100)
MONOCYTES ABSOLUTE: 0.3 K/UL (ref 0–1.3)
MONOCYTES RELATIVE PERCENT: 2 %
NEUTROPHILS ABSOLUTE: 11.6 K/UL (ref 1.7–7.7)
NEUTROPHILS RELATIVE PERCENT: 86 %
PDW BLD-RTO: 17.9 % (ref 12.4–15.4)
PERFORMED ON: ABNORMAL
PHOSPHORUS: 5.2 MG/DL (ref 2.5–4.9)
PHOSPHORUS: 6.9 MG/DL (ref 2.5–4.9)
PLATELET # BLD: 127 K/UL (ref 135–450)
PMV BLD AUTO: 8.6 FL (ref 5–10.5)
POLYCHROMASIA: ABNORMAL
POTASSIUM SERPL-SCNC: 4 MMOL/L (ref 3.5–5.1)
POTASSIUM SERPL-SCNC: 4.4 MMOL/L (ref 3.5–5.1)
RBC # BLD: 2.93 M/UL (ref 4–5.2)
SODIUM BLD-SCNC: 135 MMOL/L (ref 136–145)
SODIUM BLD-SCNC: 135 MMOL/L (ref 136–145)
TOTAL PROTEIN: 5.8 G/DL (ref 6.4–8.2)
TOTAL PROTEIN: 6.5 G/DL (ref 6.4–8.2)
WBC # BLD: 13.5 K/UL (ref 4–11)

## 2021-09-25 PROCEDURE — 85025 COMPLETE CBC W/AUTO DIFF WBC: CPT

## 2021-09-25 PROCEDURE — 6360000002 HC RX W HCPCS: Performed by: STUDENT IN AN ORGANIZED HEALTH CARE EDUCATION/TRAINING PROGRAM

## 2021-09-25 PROCEDURE — 2700000000 HC OXYGEN THERAPY PER DAY

## 2021-09-25 PROCEDURE — 99233 SBSQ HOSP IP/OBS HIGH 50: CPT | Performed by: INTERNAL MEDICINE

## 2021-09-25 PROCEDURE — 6360000002 HC RX W HCPCS: Performed by: INTERNAL MEDICINE

## 2021-09-25 PROCEDURE — 80053 COMPREHEN METABOLIC PANEL: CPT

## 2021-09-25 PROCEDURE — 6370000000 HC RX 637 (ALT 250 FOR IP): Performed by: STUDENT IN AN ORGANIZED HEALTH CARE EDUCATION/TRAINING PROGRAM

## 2021-09-25 PROCEDURE — 6370000000 HC RX 637 (ALT 250 FOR IP): Performed by: INTERNAL MEDICINE

## 2021-09-25 PROCEDURE — 94640 AIRWAY INHALATION TREATMENT: CPT

## 2021-09-25 PROCEDURE — 2580000003 HC RX 258: Performed by: STUDENT IN AN ORGANIZED HEALTH CARE EDUCATION/TRAINING PROGRAM

## 2021-09-25 PROCEDURE — 5A1D70Z PERFORMANCE OF URINARY FILTRATION, INTERMITTENT, LESS THAN 6 HOURS PER DAY: ICD-10-PCS | Performed by: INTERNAL MEDICINE

## 2021-09-25 PROCEDURE — 1200000000 HC SEMI PRIVATE

## 2021-09-25 PROCEDURE — 94761 N-INVAS EAR/PLS OXIMETRY MLT: CPT

## 2021-09-25 PROCEDURE — 36415 COLL VENOUS BLD VENIPUNCTURE: CPT

## 2021-09-25 PROCEDURE — 83735 ASSAY OF MAGNESIUM: CPT

## 2021-09-25 PROCEDURE — 2500000003 HC RX 250 WO HCPCS: Performed by: STUDENT IN AN ORGANIZED HEALTH CARE EDUCATION/TRAINING PROGRAM

## 2021-09-25 PROCEDURE — 92526 ORAL FUNCTION THERAPY: CPT

## 2021-09-25 PROCEDURE — 36592 COLLECT BLOOD FROM PICC: CPT

## 2021-09-25 PROCEDURE — 90935 HEMODIALYSIS ONE EVALUATION: CPT

## 2021-09-25 PROCEDURE — 84100 ASSAY OF PHOSPHORUS: CPT

## 2021-09-25 PROCEDURE — 2580000003 HC RX 258: Performed by: INTERNAL MEDICINE

## 2021-09-25 PROCEDURE — 2500000003 HC RX 250 WO HCPCS

## 2021-09-25 RX ORDER — SODIUM CHLORIDE 9 MG/ML
INJECTION, SOLUTION INTRAVENOUS CONTINUOUS
Status: DISCONTINUED | OUTPATIENT
Start: 2021-09-25 | End: 2021-09-26

## 2021-09-25 RX ORDER — HYDROCORTISONE 0.5 %
CREAM (GRAM) TOPICAL 3 TIMES DAILY PRN
Status: DISCONTINUED | OUTPATIENT
Start: 2021-09-25 | End: 2021-10-05 | Stop reason: HOSPADM

## 2021-09-25 RX ORDER — ALBUTEROL SULFATE 90 UG/1
2 AEROSOL, METERED RESPIRATORY (INHALATION) EVERY 4 HOURS PRN
Status: DISCONTINUED | OUTPATIENT
Start: 2021-09-25 | End: 2021-10-03

## 2021-09-25 RX ORDER — HEPARIN SODIUM 1000 [USP'U]/ML
2400 INJECTION, SOLUTION INTRAVENOUS; SUBCUTANEOUS PRN
Status: DISCONTINUED | OUTPATIENT
Start: 2021-09-25 | End: 2021-10-05 | Stop reason: HOSPADM

## 2021-09-25 RX ADMIN — FAMOTIDINE 20 MG: 10 INJECTION, SOLUTION INTRAVENOUS at 09:26

## 2021-09-25 RX ADMIN — SODIUM CHLORIDE: 9 INJECTION, SOLUTION INTRAVENOUS at 09:36

## 2021-09-25 RX ADMIN — HYDROMORPHONE HYDROCHLORIDE 0.25 MG: 1 INJECTION, SOLUTION INTRAMUSCULAR; INTRAVENOUS; SUBCUTANEOUS at 01:20

## 2021-09-25 RX ADMIN — HYDRALAZINE HYDROCHLORIDE 10 MG: 20 INJECTION INTRAMUSCULAR; INTRAVENOUS at 21:01

## 2021-09-25 RX ADMIN — HEPARIN SODIUM 5000 UNITS: 5000 INJECTION INTRAVENOUS; SUBCUTANEOUS at 06:07

## 2021-09-25 RX ADMIN — DEXTROSE MONOHYDRATE 12.5 G: 25 INJECTION, SOLUTION INTRAVENOUS at 09:24

## 2021-09-25 RX ADMIN — Medication 10 ML: at 20:17

## 2021-09-25 RX ADMIN — HEPARIN SODIUM 5000 UNITS: 5000 INJECTION INTRAVENOUS; SUBCUTANEOUS at 22:05

## 2021-09-25 RX ADMIN — PIPERACILLIN AND TAZOBACTAM 3375 MG: 3; .375 INJECTION, POWDER, LYOPHILIZED, FOR SOLUTION INTRAVENOUS at 18:54

## 2021-09-25 RX ADMIN — ACETAMINOPHEN 650 MG: 325 TABLET ORAL at 13:08

## 2021-09-25 RX ADMIN — DEXAMETHASONE SODIUM PHOSPHATE 10 MG: 4 INJECTION, SOLUTION INTRAMUSCULAR; INTRAVENOUS at 09:25

## 2021-09-25 RX ADMIN — INSULIN LISPRO 1 UNITS: 100 INJECTION, SOLUTION INTRAVENOUS; SUBCUTANEOUS at 17:22

## 2021-09-25 RX ADMIN — IPRATROPIUM BROMIDE AND ALBUTEROL 1 PUFF: 20; 100 SPRAY, METERED RESPIRATORY (INHALATION) at 12:18

## 2021-09-25 RX ADMIN — SODIUM CHLORIDE: 9 INJECTION, SOLUTION INTRAVENOUS at 21:00

## 2021-09-25 RX ADMIN — MENTHOL, METHYL SALICYLATE: 10; 15 CREAM TOPICAL at 17:21

## 2021-09-25 RX ADMIN — Medication 10 ML: at 10:31

## 2021-09-25 RX ADMIN — PIPERACILLIN AND TAZOBACTAM 3375 MG: 3; .375 INJECTION, POWDER, LYOPHILIZED, FOR SOLUTION INTRAVENOUS at 06:09

## 2021-09-25 RX ADMIN — ALBUTEROL SULFATE 2 PUFF: 90 AEROSOL, METERED RESPIRATORY (INHALATION) at 16:03

## 2021-09-25 RX ADMIN — HEPARIN SODIUM 5000 UNITS: 5000 INJECTION INTRAVENOUS; SUBCUTANEOUS at 13:09

## 2021-09-25 ASSESSMENT — PAIN SCALES - GENERAL
PAINLEVEL_OUTOF10: 6
PAINLEVEL_OUTOF10: 0
PAINLEVEL_OUTOF10: 5
PAINLEVEL_OUTOF10: 0
PAINLEVEL_OUTOF10: 8

## 2021-09-25 ASSESSMENT — ENCOUNTER SYMPTOMS
ABDOMINAL PAIN: 1
VOMITING: 0
BACK PAIN: 0
CONSTIPATION: 0
NAUSEA: 0
SHORTNESS OF BREATH: 1
DIARRHEA: 0
COUGH: 1
WHEEZING: 0
ABDOMINAL DISTENTION: 0

## 2021-09-25 ASSESSMENT — PAIN DESCRIPTION - LOCATION
LOCATION: ABDOMEN
LOCATION: ABDOMEN

## 2021-09-25 ASSESSMENT — PAIN DESCRIPTION - PAIN TYPE
TYPE: ACUTE PAIN
TYPE: ACUTE PAIN

## 2021-09-25 NOTE — RT PROTOCOL NOTE
RT Inhaler-Nebulizer Bronchodilator Protocol Note    There is a bronchodilator order in the chart from a provider indicating to follow the RT Bronchodilator Protocol and there is an Initiate RT Inhaler-Nebulizer Bronchodilator Protocol order as well (see protocol at bottom of note). CXR Findings:  No results found. The findings from the last RT Protocol Assessment were as follows:   History Pulmonary Disease: Smoker 15 pack years or more  Respiratory Pattern: Regular pattern and RR 12-20 bpm  Breath Sounds: Inspiratory and expiratory or bilateral wheezing and/or rhonchi  Cough: Strong, productive  Indication for Bronchodilator Therapy: Decreased or absent breath sounds, On home bronchodilators  Bronchodilator Assessment Score: 8    Aerosolized bronchodilator medication orders have been revised according to the RT Inhaler-Nebulizer Bronchodilator Protocol below. Respiratory Therapist to perform RT Therapy Protocol Assessment initially then follow the protocol. Repeat RT Therapy Protocol Assessment PRN for score 0-3 or on second treatment, BID, and PRN for scores above 3. No Indications - adjust the frequency to every 6 hours PRN wheezing or bronchospasm, if no treatments needed after 48 hours then discontinue using Per Protocol order mode. If indication present, adjust the RT bronchodilator orders based on the Bronchodilator Assessment Score as indicated below. Use Inhaler orders unless patient has one or more of the following: on home nebulizer, not able to hold breath for 10 seconds, is not alert and oriented, cannot activate and use MDI correctly, or respiratory rate 25 breaths per minute or more, then use the equivalent nebulizer order(s) with same Frequency and PRN reasons based on the score. If a patient is on this medication at home then do not decrease Frequency below that used at home.     0-3 - enter or revise RT bronchodilator order(s) to equivalent RT Bronchodilator order with Frequency of every 4 hours PRN for wheezing or increased work of breathing using Per Protocol order mode. 4-6 - enter or revise RT Bronchodilator order(s) to two equivalent RT bronchodilator orders with one order with BID Frequency and one order with Frequency of every 4 hours PRN wheezing or increased work of breathing using Per Protocol order mode. 7-10 - enter or revise RT Bronchodilator order(s) to two equivalent RT bronchodilator orders with one order with TID Frequency and one order with Frequency of every 4 hours PRN wheezing or increased work of breathing using Per Protocol order mode. 11-13 - enter or revise RT Bronchodilator order(s) to one equivalent RT bronchodilator order with QID Frequency and an Albuterol order with Frequency of every 4 hours PRN wheezing or increased work of breathing using Per Protocol order mode. Greater than 13 - enter or revise RT Bronchodilator order(s) to one equivalent RT bronchodilator order with every 4 hours Frequency and an Albuterol order with Frequency of every 2 hours PRN wheezing or increased work of breathing using Per Protocol order mode. RT to enter RT Home Evaluation for COPD & MDI Assessment order using Per Protocol order mode.     Electronically signed by Kristyn Jordan RCP on 9/25/2021 at 12:25 PM

## 2021-09-25 NOTE — PROGRESS NOTES
Progress Note  Admit Date: 9/15/2021              CC: F/U for COVID with respiratory failure    73 y.o. female with pmhx of CKD, current smoker, who presented with 3 days of SOB and increased work of breathing.     In the ED, patient tachypnic with accessory muscle use and hypoxic, SBP in 200s. Placed on Bipap per RT. Patient had lab work that was consistent with acute renal failure. Nephorology was consulted for CRRT. Patient was also severely acidotic and started on Bicarb gtt after one amp push. Broad spectrum abx were started in ED. Nitro gtt started.     Denies any sick contacts, not COVID vaccinated. Has not had any chest pain associated. No orthopnea, PND, swelling. Patient states that she has not had issues with breathing in past. Does not regularly follow with any physicians. Interval History:   No significant events overnight. No longer hypotensive   SPO2 between 95 to 89 on 3L NC. No chest pain      Review of Systems - Negative except  As in HPI.      Scheduled Medications:    albuterol-ipratropium  1 puff Inhalation TID    insulin lispro  0-6 Units SubCUTAneous TID WC    insulin lispro  0-3 Units SubCUTAneous Nightly    piperacillin-tazobactam  3,375 mg IntraVENous Q12H    nicotine  1 patch TransDERmal Daily    dexamethasone  10 mg IntraVENous Q24H    famotidine (PEPCID) injection  20 mg IntraVENous Daily    sodium chloride flush  5-40 mL IntraVENous 2 times per day    heparin (porcine)  5,000 Units SubCUTAneous 3 times per day      PRN Medications: methyl salicylate-menthol, albuterol sulfate HFA, perflutren lipid microspheres, labetalol, OLANZapine, hydrALAZINE, glucose, dextrose, glucagon (rDNA), dextrose, sodium chloride flush, sodium chloride, ondansetron **OR** ondansetron, polyethylene glycol, acetaminophen **OR** acetaminophen  Diet: Diet NPO    Continuous Infusions:   sodium chloride 100 mL/hr at 09/25/21 1123    dextrose 100 mL/hr (09/24/21 0528)    sodium chloride PHYSICAL EXAM:  BP (!) 145/82   Pulse 90   Temp 98.1 °F (36.7 °C) (Axillary)   Resp 24   Ht 5' 4\" (1.626 m)   Wt 143 lb 8.3 oz (65.1 kg)   SpO2 93%   BMI 24.64 kg/m²   Recent Labs     09/24/21  1740 09/24/21 2016 09/25/21  0858 09/25/21  1026 09/25/21  1254   POCGLU 184* 145* 68* 120* 119*       Intake/Output Summary (Last 24 hours) at 9/25/2021 1608  Last data filed at 9/25/2021 1500  Gross per 24 hour   Intake 476.1 ml   Output 1150 ml   Net -673.9 ml       General appearance: alert, appears stated age and cooperative  Head: Normocephalic, without obvious abnormality, atraumatic  Lungs: Some mild wheezing. No rales  Heart: regular rate and rhythm, S1, S2 normal, no murmur, click, rub or gallop  Abdomen: Mild tenderness, LLQ. No rebound. BS present  Extremities: extremities normal, atraumatic, no cyanosis or edema  Pulses: 2+ and symmetric  Neurologic: Grossly normal    LABS:  Recent Labs     09/23/21  0456 09/24/21  0430 09/25/21  0456   WBC 15.6* 17.1* 13.5*   HGB 9.1* 9.4* 8.9*   HCT 28.0* 29.3* 27.4*    144 127*                                                                    Recent Labs     09/24/21  0430 09/24/21  1550 09/25/21  0456   * 131* 135*   K 4.0 4.5 4.0   CL 99 94* 97*   CO2 21 21 22   BUN 35* 42* 47*   CREATININE 3.1* 3.6* 4.8*   GLUCOSE 52* 209* 67*     Recent Labs     09/24/21  0430 09/24/21  1550 09/25/21  0456   AST 26 21 15   ALT 50* 47* 34   BILITOT <0.2 0.3 <0.2   ALKPHOS 80 86 61     No results for input(s): TROPONINI in the last 72 hours. No results for input(s): BNP in the last 72 hours. No results for input(s): CHOL, HDL in the last 72 hours. Invalid input(s): LDLCALCU  No results for input(s): INR in the last 72 hours.       Assessment & Plan:               Acute hypoxic/hypercapnic respiratory failure 2/2 Covid-19 PNA  COVID Pneumonia  - Extubated 9/23  - Good sats on NC  - S/p decadron 20 mg I/V for five days (9/17 tp 9/21)  - S/p tocilizumab (9/16  -

## 2021-09-25 NOTE — PROGRESS NOTES
ICU Progress Note    Admit Date: 9/15/2021  Day: 11  Vent Day: Extubated (9/23)  IV Access:Peripheral, vas cath 9 days, CVC 9 days, left femoral cvc 9 days  IV Fluids:None  Vasopressors: None            Antibiotics: Zosyn  Diet: Diet NPO    CC: SOB    Interval history: No significant events overnight. Pt did c/o abdominal pain and received dilaudid for her it. Pt is much improved, she is no longer hypotensive with SBP between 155 to 99, spO2 between 95 to 89 on 3L NC. Pt is much improved and will be transferred to [facility].     Medications:     Scheduled Meds:   albuterol-ipratropium  1 puff Inhalation TID    insulin lispro  0-6 Units SubCUTAneous TID WC    insulin lispro  0-3 Units SubCUTAneous Nightly    piperacillin-tazobactam  3,375 mg IntraVENous Q12H    nicotine  1 patch TransDERmal Daily    dexamethasone  10 mg IntraVENous Q24H    famotidine (PEPCID) injection  20 mg IntraVENous Daily    sodium chloride flush  5-40 mL IntraVENous 2 times per day    heparin (porcine)  5,000 Units SubCUTAneous 3 times per day     Continuous Infusions:   sodium chloride 100 mL/hr at 09/25/21 1123    dextrose 100 mL/hr (09/24/21 0528)    sodium chloride       PRN Meds:methyl salicylate-menthol, albuterol sulfate HFA, perflutren lipid microspheres, labetalol, OLANZapine, hydrALAZINE, glucose, dextrose, glucagon (rDNA), dextrose, sodium chloride flush, sodium chloride, ondansetron **OR** ondansetron, polyethylene glycol, acetaminophen **OR** acetaminophen    Objective:   Vitals:   T-max:  Patient Vitals for the past 8 hrs:   BP Temp Temp src Pulse Resp SpO2   09/25/21 1223 -- -- -- -- 20 93 %   09/25/21 1100 135/74 -- -- 78 27 93 %   09/25/21 1000 (!) 140/75 -- -- 97 25 92 %   09/25/21 0900 (!) 141/67 -- -- 86 25 95 %   09/25/21 0800 131/78 98 °F (36.7 °C) Axillary 81 27 94 %   09/25/21 0700 129/72 -- -- 76 25 90 %       Intake/Output Summary (Last 24 hours) at 9/25/2021 8335  Last data filed at 9/25/2021 1123  Gross per 24 hour   Intake 476.1 ml   Output 1000 ml   Net -523.9 ml       Review of Systems   Constitutional: Negative for fever. Eyes: Negative for visual disturbance. Respiratory: Positive for cough and shortness of breath. Negative for wheezing. Cardiovascular: Negative for chest pain and leg swelling. Gastrointestinal: Positive for abdominal pain. Negative for abdominal distention, constipation, diarrhea, nausea and vomiting. Abdominal pain in RUQ   Musculoskeletal: Negative for arthralgias, back pain and myalgias. Neurological: Negative for weakness and headaches. Physical Exam  Constitutional:       Appearance: She is normal weight. She is ill-appearing. She is not toxic-appearing. HENT:      Head: Normocephalic and atraumatic. Eyes:      Extraocular Movements: Extraocular movements intact. Comments: By observation   Cardiovascular:      Rate and Rhythm: Normal rate and regular rhythm. Heart sounds: No murmur heard. No friction rub. No gallop. Pulmonary:      Breath sounds: Wheezing present. No rhonchi or rales. Abdominal:      General: There is no distension. Palpations: Abdomen is soft. Tenderness: There is abdominal tenderness. There is no guarding or rebound. Comments: Tenderness in RLQ   Musculoskeletal:         General: No swelling or tenderness. Right lower leg: No edema. Left lower leg: No edema. Neurological:      Mental Status: She is oriented to person, place, and time.       Comments: Pt is orientated to self, place, situation, year, but states that the month is October           LABS:    CBC:   Recent Labs     09/23/21  0456 09/24/21  0430 09/25/21  0456   WBC 15.6* 17.1* 13.5*   HGB 9.1* 9.4* 8.9*   HCT 28.0* 29.3* 27.4*    144 127*   MCV 93.6 93.4 93.5     Renal:    Recent Labs     09/23/21  0456 09/23/21  0553 09/24/21  0430 09/24/21  1550 09/25/21  0456   NA  --    < > 134* 131* 135*   K  --    < > 4.0 4.5 4.0   CL  --    < > 99 94* 97*   CO2  --    < > 21 21 22   BUN  --    < > 35* 42* 47*   CREATININE  --    < > 3.1* 3.6* 4.8*   GLUCOSE  --    < > 52* 209* 67*   CALCIUM  --    < > 8.2* 8.4 8.2*   MG 2.30  --  2.30  --  2.40   PHOS  --    < > 3.3 4.4 5.2*   ANIONGAP  --    < > 14 16 16    < > = values in this interval not displayed. Hepatic:   Recent Labs     09/24/21  0430 09/24/21  1550 09/25/21  0456   AST 26 21 15   ALT 50* 47* 34   BILITOT <0.2 0.3 <0.2   PROT 6.0* 6.4 5.8*   LABALBU 2.7* 2.8* 2.6*   ALKPHOS 80 86 61     Troponin: No results for input(s): TROPONINI in the last 72 hours. BNP: No results for input(s): BNP in the last 72 hours. Lipids: No results for input(s): CHOL, HDL in the last 72 hours. Invalid input(s): LDLCALCU, TRIGLYCERIDE  ABGs:    Recent Labs     09/23/21  0611 09/23/21  1403 09/24/21  0430   PHART 7.377 7.314* 7.351   DSH4USZ 44.3 49.2* 44.7   PO2ART 96.2 64.4* 55.6*   PDT5BNU 26 25.0 25   BEART 0.7 -1 -1.0   V2EHLGCS 98 90* 89*   APJ6GLZ 27 27 26       INR: No results for input(s): INR in the last 72 hours. Lactate: No results for input(s): LACTATE in the last 72 hours. Cultures:  -----------------------------------------------------------------  RAD:   XR ABDOMEN (KUB) (SINGLE AP VIEW)   Final Result      Nonspecific bowel gas pattern      XR CHEST PORTABLE   Final Result      Coarse multifocal opacities most compatible with viral pneumonia. There is progression in the left lower lobe where there is obscuration of the left hemidiaphragm which is a new finding. Stable cardiac mediastinal silhouette. Lines and tubes without change. XR CHEST PORTABLE   Final Result   Worsening bilateral pulmonary infiltrates. Placement of endotracheal    tube as above. XR CHEST PORTABLE   Final Result   1. Right IJ line projects over the mid SVC. No pneumothorax. 2.  Unchanged multifocal airspace disease.       XR CHEST PORTABLE   Final Result    Patchy bilateral airspace opacities greatest in the left lung base suggestive of multifocal pneumonia. Assessment/Plan:     Urosepsis, resolving  . Sepsis likely from covid pneumonia vs urosepsis. CXR (9/22) had concern for secondary infection, Zosyn will be continued for a total of ten days. Respiratory cultures on  9/22 were negative. Pt is improving, as she is no longer on vasopressors, her BP has been stable, and her sepsis is resolving.     -continue Zosyn (9/16 until 9/26)     Acute hypoxic/hypercapnic respiratory failure 2/2 Covid-19 PNA  COVID positive. Pt s/p extubation day 2, precedex has been stopped, her spO2 has ranged in the low 90s on 4L NC.       -Decadron 10mg IV for 5 days (9/22 to 9/26)  -s/p decadron 20 mg I/V for five days (9/17 tp 9/21)  -s/p tocilizumab (9/16)    HTN   Pt has elevated SBP bt/ 152 to 135 on A-Line reading. Pt has PRN hydralazine and PRN labetalol available.    -Labetalol 10 mg IV PRN q6H  -Hydralazine 10 mg IV q6H    Anemia  Hgb 8.9, continues to be stable. Likely chronic condition, due to pt's CKD. - CBC daily    Acute renal failure  CKD  Pt is day 2 off CRRT, nephrology gave lasix yesterday and the pt had a UO of 1 L over the past 24 hours. Pt to have HD today and nephrology will consider diuretics vs HD to manage the pt's volume status in the following days.    - HD today  - RFP daily  - Strict I/O  - Daily weights  - Nephrology folllowing     Hyperglycemia 2/2 to high dose steroids  Pt's blood sugar has been well controlled over the last 24 hours ranging between 145 to 68.     -Consider SLP evaluation  -LDSSI  -hypoglycemia protocol  -POCT glucose checks    Code Status: Full code   FEN: Diet NPO  PPX: Heparin TID  DISPO: Allen Muller DO, PGY-1  09/25/21  2:55 PM  Patient examined, findings as discussed with . Agree with assessment and plan. Respiratory failure continues to improve with treatment of lower respiratory infection and COPD exacerbation.   Oxygen flow rate reduced to 5 mg.  Mentation is significantly improved. She is coughing and able to manage secretions with a suction wand. Renal failure continues to require intervention, and she is being treated with hemodialysis today.   Discussed with Renuka Curtis

## 2021-09-25 NOTE — PROGRESS NOTES
Speech Language Pathology  Facility/Department: [facility]  Dysphagia Daily Treatment Note    NAME: Roberto Zapien  : 1950  MRN: [id number]    Patient Diagnosis(es):   Patient Active Problem List    Diagnosis Date Noted    Septic shock ([facility] [address] 2021    Acute cystitis without hematuria 2021    Pneumonia of left lower lobe due to infectious organism      novel coronavirus-infected pneumonia (NCIP)     Acute respiratory failure with hypoxia ([facility] [address]     Acute renal failure ([facility] [address] 09/15/2021       Recent Chest Xray 21      Coarse multifocal opacities most compatible with viral pneumonia. There is progression in the left lower lobe where there is obscuration of the left hemidiaphragm which is a new finding.       Stable cardiac mediastinal silhouette.       Lines and tubes without change.        Previous MBS; none    Chart reviewed. Medical Diagnosis: Metabolic Acidosis  Treatment Diagnosis: Dysphagia    BSE Impression :  21 Impression  Pt was intubated 9/15-21. Pt has fairly strong voice and strong cough and is coughing up secretions. Pt oriented to person and place and followed simple commands. Oral structures grossly intact, pt with absent top dentition, limited bottom teeth. Pt analyzed with ice chips, water via tsp/cup and straw, puree and solid. Adequate labial seal with cup and straw, no anterior loss. Prolonged mastication with solid, due to limited dentition and fatigue. Initially pt tolerated all with no overt signs of aspiration, with good swallow movement felt upon palpation of anterior neck. However as trials progressed, RR increased from lower 20's to 30. Pt began exhibiting coughing and required suctioning of secretions with yankauer.   Discontinued further PO trials due to concern for pt safety  Recommend aggressive oral care with suction toothbrush, and ice chips/tsps of water for comfort and plan     MBS results - not appropriate at this time d/t COVID positive    Pain: yes: stomach; nursing aware and has addressed. Current Diet : NPO with ice chips/tsps of water: 9/25: Trial Full liquid diet with nursing staff feeding pt small amounts (2-3 oz at a time). If any s/s of aspiration or if lung status decline emerges, then d/c po until we recheck. Treatment:  Pt seen bedside to address the following goals:  1-The patient/caregiver will demonstrate understanding of compensatory strategies for improved swallowing safety  9/24; Educated pt to purpose of visit, s/s of aspiration, concern if aspiration occurs, explanation as to how intubation can affect swallowing and rationale for NPO, oral care and ice chips. Pt will require cont education /reinforcement  Cont goal    9/25:  Educated pt to reason for visit, what aspiration is, education on strategies for swallowing and diet trial recommendation. Pt highly impulsive with amount of intake despite max verbal cues so recommend nursing feed at this time. Pt requires further reinforcement and education (questionable full understanding and follow through). 2- The patient will tolerate repeat BSE when able.    9/25: Pt alert, wanting to eat. Oral care given prior and after with suction swab. Pt with RR averaging around 25, occasional increase to 30 but decreased to 23 if took break. Pt analyzed with ice chips, water via cup/straw, puree and solid. Pt with 2 delayed coughs (around 45 seconds after po trials). No immediate cough/wet vocal quality or throat clearing with any po trials. Able to masticate solid (mildly prolonged due to large bite pt took despite verbal/tactile cues). Good swallow felt upon anterior neck. Recommend trial of full liquid diet with nursing staff feeding for monitoring amount of intake and toleration of diet. Patient/Family/Caregiver Education: see under goal 1 above  .     Compensatory Strategies:  PO with nursing staff feeding only (to control amount of intake and to monitor for any difficulties. Take breaks between every couple bites/sips  Upright with po   Small amounts at slow rate       Plan:  Continued daily Dysphagia treatment with goals per  plan of care. Diet recommendations: Trial Full liquid diet with nursing staff feeding pt small amounts (2-3 oz at a time). If any s/s of aspiration or if lung status decline emerges, then d/c po until we recheck. DC recommendation: TBD closer to discharge  Treatment: 15  D/W nursingNahomi  Needs met prior to leaving room, call button in reach. Ruthie Last MA CCC/SLP [id number]  Pg.  # [phone]    If patient is discharged prior to next treatment, this note will serve as the discharge summary

## 2021-09-26 LAB
A/G RATIO: 0.7 (ref 1.1–2.2)
ALBUMIN SERPL-MCNC: 2.8 G/DL (ref 3.4–5)
ALP BLD-CCNC: 126 U/L (ref 40–129)
ALT SERPL-CCNC: 28 U/L (ref 10–40)
ANION GAP SERPL CALCULATED.3IONS-SCNC: 16 MMOL/L (ref 3–16)
AST SERPL-CCNC: 15 U/L (ref 15–37)
BASOPHILS ABSOLUTE: 0 K/UL (ref 0–0.2)
BASOPHILS RELATIVE PERCENT: 0 %
BILIRUB SERPL-MCNC: 0.3 MG/DL (ref 0–1)
BUN BLDV-MCNC: 37 MG/DL (ref 7–20)
CALCIUM SERPL-MCNC: 8.2 MG/DL (ref 8.3–10.6)
CHLORIDE BLD-SCNC: 100 MMOL/L (ref 99–110)
CO2: 22 MMOL/L (ref 21–32)
CREAT SERPL-MCNC: 3.9 MG/DL (ref 0.6–1.2)
EOSINOPHILS ABSOLUTE: 0 K/UL (ref 0–0.6)
EOSINOPHILS RELATIVE PERCENT: 0.2 %
GFR AFRICAN AMERICAN: 14
GFR NON-AFRICAN AMERICAN: 11
GLOBULIN: 4 G/DL
GLUCOSE BLD-MCNC: 109 MG/DL (ref 70–99)
GLUCOSE BLD-MCNC: 153 MG/DL (ref 70–99)
GLUCOSE BLD-MCNC: 206 MG/DL (ref 70–99)
GLUCOSE BLD-MCNC: 96 MG/DL (ref 70–99)
HBV SURFACE AB TITR SER: <3.5 MIU/ML
HCT VFR BLD CALC: 28.7 % (ref 36–48)
HEMOGLOBIN: 9.5 G/DL (ref 12–16)
HEPATITIS B SURFACE ANTIGEN INTERPRETATION: NORMAL
LYMPHOCYTES ABSOLUTE: 0.7 K/UL (ref 1–5.1)
LYMPHOCYTES RELATIVE PERCENT: 4.8 %
MAGNESIUM: 2.1 MG/DL (ref 1.8–2.4)
MCH RBC QN AUTO: 30.6 PG (ref 26–34)
MCHC RBC AUTO-ENTMCNC: 33.1 G/DL (ref 31–36)
MCV RBC AUTO: 92.4 FL (ref 80–100)
MONOCYTES ABSOLUTE: 1.1 K/UL (ref 0–1.3)
MONOCYTES RELATIVE PERCENT: 8 %
NEUTROPHILS ABSOLUTE: 12.3 K/UL (ref 1.7–7.7)
NEUTROPHILS RELATIVE PERCENT: 87 %
PDW BLD-RTO: 18.3 % (ref 12.4–15.4)
PERFORMED ON: ABNORMAL
PHOSPHORUS: 5 MG/DL (ref 2.5–4.9)
PLATELET # BLD: 141 K/UL (ref 135–450)
PMV BLD AUTO: 9.2 FL (ref 5–10.5)
POTASSIUM SERPL-SCNC: 4 MMOL/L (ref 3.5–5.1)
RBC # BLD: 3.1 M/UL (ref 4–5.2)
SODIUM BLD-SCNC: 138 MMOL/L (ref 136–145)
TOTAL PROTEIN: 6.8 G/DL (ref 6.4–8.2)
WBC # BLD: 14.2 K/UL (ref 4–11)

## 2021-09-26 PROCEDURE — 86706 HEP B SURFACE ANTIBODY: CPT

## 2021-09-26 PROCEDURE — 6370000000 HC RX 637 (ALT 250 FOR IP): Performed by: STUDENT IN AN ORGANIZED HEALTH CARE EDUCATION/TRAINING PROGRAM

## 2021-09-26 PROCEDURE — 99233 SBSQ HOSP IP/OBS HIGH 50: CPT | Performed by: INTERNAL MEDICINE

## 2021-09-26 PROCEDURE — 2580000003 HC RX 258: Performed by: INTERNAL MEDICINE

## 2021-09-26 PROCEDURE — 2700000000 HC OXYGEN THERAPY PER DAY

## 2021-09-26 PROCEDURE — 94761 N-INVAS EAR/PLS OXIMETRY MLT: CPT

## 2021-09-26 PROCEDURE — 84100 ASSAY OF PHOSPHORUS: CPT

## 2021-09-26 PROCEDURE — 6360000002 HC RX W HCPCS: Performed by: STUDENT IN AN ORGANIZED HEALTH CARE EDUCATION/TRAINING PROGRAM

## 2021-09-26 PROCEDURE — 87340 HEPATITIS B SURFACE AG IA: CPT

## 2021-09-26 PROCEDURE — 36592 COLLECT BLOOD FROM PICC: CPT

## 2021-09-26 PROCEDURE — 2580000003 HC RX 258: Performed by: STUDENT IN AN ORGANIZED HEALTH CARE EDUCATION/TRAINING PROGRAM

## 2021-09-26 PROCEDURE — 36415 COLL VENOUS BLD VENIPUNCTURE: CPT

## 2021-09-26 PROCEDURE — 83735 ASSAY OF MAGNESIUM: CPT

## 2021-09-26 PROCEDURE — 85025 COMPLETE CBC W/AUTO DIFF WBC: CPT

## 2021-09-26 PROCEDURE — 80053 COMPREHEN METABOLIC PANEL: CPT

## 2021-09-26 PROCEDURE — 6360000002 HC RX W HCPCS: Performed by: INTERNAL MEDICINE

## 2021-09-26 PROCEDURE — 2500000003 HC RX 250 WO HCPCS

## 2021-09-26 PROCEDURE — 1200000000 HC SEMI PRIVATE

## 2021-09-26 PROCEDURE — 94640 AIRWAY INHALATION TREATMENT: CPT

## 2021-09-26 RX ORDER — FUROSEMIDE 10 MG/ML
80 INJECTION INTRAMUSCULAR; INTRAVENOUS ONCE
Status: COMPLETED | OUTPATIENT
Start: 2021-09-26 | End: 2021-09-26

## 2021-09-26 RX ADMIN — PIPERACILLIN AND TAZOBACTAM 3375 MG: 3; .375 INJECTION, POWDER, LYOPHILIZED, FOR SOLUTION INTRAVENOUS at 05:31

## 2021-09-26 RX ADMIN — SODIUM CHLORIDE: 9 INJECTION, SOLUTION INTRAVENOUS at 06:19

## 2021-09-26 RX ADMIN — FUROSEMIDE 80 MG: 10 INJECTION, SOLUTION INTRAMUSCULAR; INTRAVENOUS at 12:58

## 2021-09-26 RX ADMIN — ACETAMINOPHEN 650 MG: 325 TABLET ORAL at 23:01

## 2021-09-26 RX ADMIN — INSULIN LISPRO 1 UNITS: 100 INJECTION, SOLUTION INTRAVENOUS; SUBCUTANEOUS at 21:30

## 2021-09-26 RX ADMIN — Medication 10 ML: at 21:25

## 2021-09-26 RX ADMIN — PIPERACILLIN AND TAZOBACTAM 3375 MG: 3; .375 INJECTION, POWDER, LYOPHILIZED, FOR SOLUTION INTRAVENOUS at 17:54

## 2021-09-26 RX ADMIN — FAMOTIDINE 20 MG: 10 INJECTION, SOLUTION INTRAVENOUS at 09:53

## 2021-09-26 RX ADMIN — DEXAMETHASONE SODIUM PHOSPHATE 10 MG: 4 INJECTION, SOLUTION INTRAMUSCULAR; INTRAVENOUS at 09:52

## 2021-09-26 RX ADMIN — HEPARIN SODIUM 5000 UNITS: 5000 INJECTION INTRAVENOUS; SUBCUTANEOUS at 21:30

## 2021-09-26 RX ADMIN — HEPARIN SODIUM 5000 UNITS: 5000 INJECTION INTRAVENOUS; SUBCUTANEOUS at 12:58

## 2021-09-26 RX ADMIN — Medication 10 ML: at 09:53

## 2021-09-26 RX ADMIN — INSULIN LISPRO 2 UNITS: 100 INJECTION, SOLUTION INTRAVENOUS; SUBCUTANEOUS at 17:58

## 2021-09-26 RX ADMIN — HEPARIN SODIUM 5000 UNITS: 5000 INJECTION INTRAVENOUS; SUBCUTANEOUS at 05:30

## 2021-09-26 ASSESSMENT — PAIN SCALES - GENERAL
PAINLEVEL_OUTOF10: 0
PAINLEVEL_OUTOF10: 3
PAINLEVEL_OUTOF10: 0

## 2021-09-26 NOTE — PROGRESS NOTES
Joelle at beside with this nurse. Pt is less agitated with blood returned and reorientation. BP is back to baseline. Dialysis restarted with lower fluid goal and UF rate.

## 2021-09-26 NOTE — PROGRESS NOTES
Chance Orozco : [phone]     Fax :[phone]        Renal  Note    Patient:  Brady Crenshaw  MRN: [id number]    CHIEF COMPLAINT:  SOB, chills    History Obtained From:  electronic medical record      09/25/21    BP in good range   UO better with lasix   HD today         Past Medical History:     OA (osteoarthritis)    Hypertension    Personal history of tobacco use, presenting hazards to health    Lower limb length difference    Current smoker    Renal and perinephric abscess    Acute renal failure (HCC)    Anemia, unspecified    Routine adult health maintenance    Peptic ulcer, unspecified site, unspecified as acute or chronic, without mention of hemorrhage, perforation, or obstruction    Right hip pain    DDD (degenerative disc disease), lumbosacral    External thrombosed hemorrhoids    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 Medications Prior to Admission:    Prior to Admission medications    Medication Sig Start Date End Date Taking?  Authorizing Provider   albuterol sulfate  (90 Base) MCG/ACT inhaler  9/7/21   Historical Provider, MD   atenolol (TENORMIN) 25 MG tablet Take 25 mg by mouth daily    Historical Provider, MD   ibuprofen (ADVIL;MOTRIN) 600 MG tablet TAKE 1 TABLET BY MOUTH THREE TIMES A DAY WITH FOOD 8/2/21   Historical Provider, MD       Allergies:  Patient has no known allergies. Social History:   TOBACCO:   reports that she has been smoking cigarettes. She has been smoking about 0.50 packs per day. She has never used smokeless tobacco.  ETOH:   reports previous alcohol use. OCCUPATION:      Family History:   History reviewed. No pertinent family history. Physical Exam:    Vitals: /82   Pulse 99   Temp 98 °F (36.7 °C) (Axillary)   Resp 30   Ht 5' 4\" (1.626 m)   Wt 143 lb 15.4 oz (65.3 kg)   SpO2 94%   BMI 24.71 kg/m²   Constitutional:  On 4L NC  HEENT:  ETT in place  Neck: unable to assess JVD  Cardiovascular:  S1, S2 reg  Respiratory: symmetric lung expansion  Abdomen:  +BS, soft, ND  Ext: + lower extremity edema  Skin: dry/intact  CNS: sedated     CBC:   Recent Labs     09/23/21  0456 09/24/21  0430 09/25/21  0456   WBC 15.6* 17.1* 13.5*   HGB 9.1* 9.4* 8.9*    144 127*     BMP:    Recent Labs     09/24/21  1550 09/25/21  0456 09/25/21  1712   * 135* 135*   K 4.5 4.0 4.4   CL 94* 97* 98*   CO2 21 22 19*   BUN 42* 47* 55*   CREATININE 3.6* 4.8* 5.0*   GLUCOSE 209* 67* 205*     Hepatic:   Recent Labs     09/24/21  1550 09/25/21  0456 09/25/21  1712   AST 21 15 14*   ALT 47* 34 32   BILITOT 0.3 <0.2 <0.2   ALKPHOS 86 61 74     Troponin:   No results for input(s): TROPONINI in the last 72 hours. BNP: No results for input(s): BNP in the last 72 hours. Lipids: No results for input(s): CHOL, HDL in the last 72 hours. Invalid input(s): LDLCALCU  ABGs:   Lab Results   Component Value Date    PHART 7.351 09/24/2021    PO2ART 55.6 09/24/2021    VKP7CVS 44.7 09/24/2021     INR: No results for input(s): INR in the last 72 hours. -----------------------------------------------------------------      Assessment and Plan   1. Acute Renal Failure   W/ ho CKD. Pt non compliant    2.  Acid/Base electrolyte abnormalities    3. Anemia    4. Covid-19 infection    Patient Active Problem List   Diagnosis Code    Acute renal failure ([facility] [address] N17.9    2019 novel coronavirus-infected pneumonia (NCIP) U07.1, J12.82    Acute respiratory failure with hypoxia (HCC) J96.01    Septic shock ([facility]) A41.9, R65.21    Acute cystitis without hematuria N30.00    Pneumonia of left lower lobe due to infectious organism J18.9       Plan   - HD today   - UF as rodger   - replace lytes   - Monitor UO   - COVID -19 treatment per primary team        Maintain SBP> 90 mmHg   Daily weights   AVOID NSAIDs  Avoid Nephrotoxins  Monitor Intake/Output  Call if significant decrease in urine output         Thank you for allowing us to participate in care of [address] [facility] FLO Reyes MD   [facility] [address]  Office : [phone]  Fax :[phone]

## 2021-09-26 NOTE — PROGRESS NOTES
Sukhi Andersonberry : [phone]     Fax :[phone]        Renal  Note    Patient:  Kiki Slaughter  MRN: [id number]    CHIEF COMPLAINT:  SOB, chills    History Obtained From:  electronic medical record      09/26/21    BP in good range   UO decent   HD yesterday   rodger UF well         Past Medical History:     OA (osteoarthritis)    Hypertension    Personal history of tobacco use, presenting hazards to health    Lower limb length difference    Current smoker    Renal and perinephric abscess    Acute renal failure (HCC)    Anemia, unspecified    Routine adult health maintenance    Peptic ulcer, unspecified site, unspecified as acute or chronic, without mention of hemorrhage, perforation, or obstruction    Right hip pain    DDD (degenerative disc disease), lumbosacral    External thrombosed hemorrhoids    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 Medications Prior to Admission:    Prior to Admission medications    Medication Sig Start Date End Date Taking?  Authorizing Provider   albuterol sulfate  (90 Base) MCG/ACT inhaler  9/7/21   Historical Provider, MD   atenolol (TENORMIN) 25 MG tablet Take 25 mg by mouth daily    Historical Provider, MD   ibuprofen (ADVIL;MOTRIN) 600 MG tablet TAKE 1 TABLET BY MOUTH THREE TIMES A DAY WITH FOOD 8/2/21   Historical Provider, MD       Allergies:  Patient has no known allergies. Social History:   TOBACCO:   reports that she has been smoking cigarettes. She has been smoking about 0.50 packs per day. She has never used smokeless tobacco.  ETOH:   reports previous alcohol use. OCCUPATION:      Family History:   History reviewed. No pertinent family history. Physical Exam:    Vitals: BP (!) 152/75   Pulse 91   Temp 98.2 °F (36.8 °C) (Oral)   Resp 18   Ht 5' 4\" (1.626 m)   Wt 139 lb 15.9 oz (63.5 kg)   SpO2 93%   BMI 24.03 kg/m²   Constitutional:  On 4L NC  HEENT:  ETT in place  Neck: unable to assess JVD  Cardiovascular:  S1, S2 reg  Respiratory: symmetric lung expansion  Abdomen:  +BS, soft, ND  Ext: + lower extremity edema  Skin: dry/intact  CNS: sedated     CBC:   Recent Labs     09/24/21  0430 09/25/21 0456 09/26/21  0535   WBC 17.1* 13.5* 14.2*   HGB 9.4* 8.9* 9.5*    127* 141     BMP:    Recent Labs     09/25/21 0456 09/25/21 1712 09/26/21  0535   * 135* 138   K 4.0 4.4 4.0   CL 97* 98* 100   CO2 22 19* 22   BUN 47* 55* 37*   CREATININE 4.8* 5.0* 3.9*   GLUCOSE 67* 205* 96     Hepatic:   Recent Labs     09/25/21 0456 09/25/21 1712 09/26/21  0535   AST 15 14* 15   ALT 34 32 28   BILITOT <0.2 <0.2 0.3   ALKPHOS 61 74 126     Troponin:   No results for input(s): TROPONINI in the last 72 hours. BNP: No results for input(s): BNP in the last 72 hours. Lipids: No results for input(s): CHOL, HDL in the last 72 hours. Invalid input(s): LDLCALCU  ABGs:   Lab Results   Component Value Date    PHART 7.351 09/24/2021    PO2ART 55.6 09/24/2021    BFH9HLO 44.7 09/24/2021     INR: No results for input(s): INR in the last 72 hours. -----------------------------------------------------------------      Assessment and Plan   1. Acute Renal Failure   W/ ho CKD. Pt non compliant    2.  Acid/Base electrolyte abnormalities    3. Anemia    4. Covid-19 infection    Patient Active Problem List   Diagnosis Code    Acute renal failure ([facility] [address] N17.9    2019 novel coronavirus-infected pneumonia (NCIP) U07.1, J12.82    Acute respiratory failure with hypoxia (HCC) J96.01    Septic shock ([facility]) A41.9, R65.21    Acute cystitis without hematuria N30.00    Pneumonia of left lower lobe due to infectious organism J18.9       Plan   - HD as needed   - lasix IV   - UF as rodger   - replace lytes   - Monitor UO   - COVID -19 treatment per primary team        Maintain SBP> 90 mmHg   Daily weights   AVOID NSAIDs  Avoid Nephrotoxins  Monitor Intake/Output  Call if significant decrease in urine output         Thank you for allowing us to participate in care of [address] FLO You MD   [facility] [address]  Office : [phone]  Fax :[phone]

## 2021-09-26 NOTE — PLAN OF CARE
Problem: Falls - Risk of:  Goal: Will remain free from falls  Description: Will remain free from falls  Outcome: Ongoing  Goal: Absence of physical injury  Description: Absence of physical injury  Outcome: Ongoing     Problem: Airway Clearance - Ineffective  Goal: Achieve or maintain patent airway  Outcome: Ongoing     Problem: Gas Exchange - Impaired  Goal: Absence of hypoxia  Outcome: Ongoing  Goal: Promote optimal lung function  Outcome: Ongoing     Problem: Breathing Pattern - Ineffective  Goal: Ability to achieve and maintain a regular respiratory rate  Outcome: Ongoing     Problem:  Body Temperature -  Risk of, Imbalanced  Goal: Ability to maintain a body temperature within defined limits  Outcome: Ongoing  Goal: Will regain or maintain usual level of consciousness  Outcome: Ongoing  Goal: Complications related to the disease process, condition or treatment will be avoided or minimized  Outcome: Ongoing     Problem: Isolation Precautions - Risk of Spread of Infection  Goal: Prevent transmission of infection  Outcome: Ongoing     Problem: Nutrition Deficits  Goal: Optimize nutritional status  Outcome: Ongoing     Problem: Risk for Fluid Volume Deficit  Goal: Maintain normal heart rhythm  Outcome: Ongoing  Goal: Maintain absence of muscle cramping  Outcome: Ongoing  Goal: Maintain normal serum potassium, sodium, calcium, phosphorus, and pH  Outcome: Ongoing     Problem: Loneliness or Risk for Loneliness  Goal: Demonstrate positive use of time alone when socialization is not possible  Outcome: Ongoing     Problem: Fatigue  Goal: Verbalize increase energy and improved vitality  Outcome: Ongoing     Problem: Patient Education: Go to Patient Education Activity  Goal: Patient/Family Education  Outcome: Ongoing     Problem: Skin Integrity:  Goal: Will show no infection signs and symptoms  Description: Will show no infection signs and symptoms  Outcome: Ongoing  Goal: Absence of new skin breakdown  Description: Absence of new skin breakdown  Outcome: Ongoing     Problem: Nutrition  Goal: Optimal nutrition therapy  Outcome: Ongoing

## 2021-09-26 NOTE — PROGRESS NOTES
Progress Note  Admit Date: 9/15/2021              CC: F/U for COVID with respiratory failure    73 y.o. female with pmhx of CKD, current smoker, who presented with 3 days of SOB and increased work of breathing.     In the ED, patient tachypnic with accessory muscle use and hypoxic, SBP in 200s. Placed on Bipap per RT. Patient had lab work that was consistent with acute renal failure. Nephorology was consulted for CRRT. Patient was also severely acidotic and started on Bicarb gtt after one amp push. Broad spectrum abx were started in ED. Nitro gtt started.     Denies any sick contacts, not COVID vaccinated. Has not had any chest pain associated. No orthopnea, PND, swelling. Patient states that she has not had issues with breathing in past. Does not regularly follow with any physicians. Interval History:   No significant events overnight. No longer hypotensive   SPO2 between 95 to 89 on 3L NC. No chest pain      Review of Systems - Negative except  As in HPI.      Scheduled Medications:    albuterol-ipratropium  1 puff Inhalation TID    insulin lispro  0-6 Units SubCUTAneous TID WC    insulin lispro  0-3 Units SubCUTAneous Nightly    piperacillin-tazobactam  3,375 mg IntraVENous Q12H    nicotine  1 patch TransDERmal Daily    famotidine (PEPCID) injection  20 mg IntraVENous Daily    sodium chloride flush  5-40 mL IntraVENous 2 times per day    heparin (porcine)  5,000 Units SubCUTAneous 3 times per day      PRN Medications: methyl salicylate-menthol, albuterol sulfate HFA, heparin (porcine), perflutren lipid microspheres, labetalol, OLANZapine, hydrALAZINE, glucose, dextrose, glucagon (rDNA), dextrose, sodium chloride flush, sodium chloride, ondansetron **OR** ondansetron, polyethylene glycol, acetaminophen **OR** acetaminophen  Diet: Diet NPO    Continuous Infusions:   sodium chloride 100 mL/hr at 09/26/21 0800    dextrose 100 mL/hr (09/24/21 0528)    sodium chloride         PHYSICAL EXAM:  BP (!) 131/52   Pulse 96   Temp 98 °F (36.7 °C) (Oral)   Resp 20   Ht 5' 4\" (1.626 m)   Wt 139 lb 15.9 oz (63.5 kg)   SpO2 94%   BMI 24.03 kg/m²   Recent Labs     09/25/21  1026 09/25/21  1254 09/25/21  1719 09/25/21  2205 09/26/21  1100   POCGLU 120* 119* 174* 134* 109*       Intake/Output Summary (Last 24 hours) at 9/26/2021 1452  Last data filed at 9/26/2021 1200  Gross per 24 hour   Intake 2046.18 ml   Output 1150 ml   Net 896.18 ml       General appearance: alert, appears stated age and cooperative  Head: Normocephalic, without obvious abnormality, atraumatic  Lungs: Some mild wheezing. No rales  Heart: regular rate and rhythm, S1, S2 normal, no murmur, click, rub or gallop  Abdomen: Mild tenderness, LLQ. No rebound. BS present  Extremities: extremities normal, atraumatic, no cyanosis or edema  Pulses: 2+ and symmetric  Neurologic: Grossly normal    LABS:  Recent Labs     09/24/21  0430 09/25/21  0456 09/26/21  0535   WBC 17.1* 13.5* 14.2*   HGB 9.4* 8.9* 9.5*   HCT 29.3* 27.4* 28.7*    127* 141                                                                    Recent Labs     09/25/21  0456 09/25/21 1712 09/26/21  0535   * 135* 138   K 4.0 4.4 4.0   CL 97* 98* 100   CO2 22 19* 22   BUN 47* 55* 37*   CREATININE 4.8* 5.0* 3.9*   GLUCOSE 67* 205* 96     Recent Labs     09/25/21  0456 09/25/21  1712 09/26/21  0535   AST 15 14* 15   ALT 34 32 28   BILITOT <0.2 <0.2 0.3   ALKPHOS 61 74 126     No results for input(s): TROPONINI in the last 72 hours. No results for input(s): BNP in the last 72 hours. No results for input(s): CHOL, HDL in the last 72 hours. Invalid input(s): LDLCALCU  No results for input(s): INR in the last 72 hours.       Assessment & Plan:    73 y.o. female with CKD, current smoker,admitted 9/15/2021 with COVID, resp failure.        Acute hypoxic/hypercapnic respiratory failure 2/2 Covid-19 PNA  COVID Pneumonia  - Extubated 9/23  - Good sats on NC  - S/p decadron 20 mg I/V for five days (9/17 tp 9/21)  - S/p tocilizumab (9/16  - Complete Decadron   - Wean O2 as able      Sepsis  - Likely viral sepsis sec to COVID  - Possible superimposed bacterial pneumonia  - Respiratory cultures on  9/22 were negative. - Weaned off vasopressors, sepsis is resolving.  - Complete empiric txt course with Zosyn (9/16 until 9/26)       HTN   - Continue antihypertensives  - Monitor Bps       Acute Anemia  Acute anemia possibly sec to sepsis, infection  Likely chronic component sec to CKD  Hgb stable. .        Acute renal failure  CKD  Volume overload  Pt is off CRRT  Diuretics IV lasix again  - May not need further HD to manage the pt's volume status if responds to diuretics: Defer to Nephro  - Nephrology folllowing       Hyperglycemia  likely sec to steroids  - Possible underlying DM, A1C 6.6  -LDSSI      The patient and / or the family were informed of the results of any tests, a time was given to answer questions, a plan was proposed and they agreed with plan.     Full Code       Disposition: Transfered from ICU 9/25/2021  Likely can Dc in 1-2 days if renal function continues to improve, and responds to diuretics  PT/OT eval pending to guide disposition          Gilberto Barone MD

## 2021-09-27 LAB
A/G RATIO: 0.8 (ref 1.1–2.2)
ALBUMIN SERPL-MCNC: 2.9 G/DL (ref 3.4–5)
ALP BLD-CCNC: 66 U/L (ref 40–129)
ALT SERPL-CCNC: 21 U/L (ref 10–40)
ANION GAP SERPL CALCULATED.3IONS-SCNC: 17 MMOL/L (ref 3–16)
AST SERPL-CCNC: 10 U/L (ref 15–37)
BASOPHILS ABSOLUTE: 0 K/UL (ref 0–0.2)
BASOPHILS RELATIVE PERCENT: 0.4 %
BILIRUB SERPL-MCNC: 0.3 MG/DL (ref 0–1)
BUN BLDV-MCNC: 46 MG/DL (ref 7–20)
CALCIUM SERPL-MCNC: 8 MG/DL (ref 8.3–10.6)
CHLORIDE BLD-SCNC: 107 MMOL/L (ref 99–110)
CO2: 19 MMOL/L (ref 21–32)
CREAT SERPL-MCNC: 4.9 MG/DL (ref 0.6–1.2)
EOSINOPHILS ABSOLUTE: 0 K/UL (ref 0–0.6)
EOSINOPHILS RELATIVE PERCENT: 0.1 %
GFR AFRICAN AMERICAN: 11
GFR NON-AFRICAN AMERICAN: 9
GLOBULIN: 3.5 G/DL
GLUCOSE BLD-MCNC: 127 MG/DL (ref 70–99)
GLUCOSE BLD-MCNC: 196 MG/DL (ref 70–99)
GLUCOSE BLD-MCNC: 211 MG/DL (ref 70–99)
GLUCOSE BLD-MCNC: 80 MG/DL (ref 70–99)
GLUCOSE BLD-MCNC: 90 MG/DL (ref 70–99)
HCT VFR BLD CALC: 25.7 % (ref 36–48)
HEMOGLOBIN: 8.4 G/DL (ref 12–16)
LYMPHOCYTES ABSOLUTE: 0.5 K/UL (ref 1–5.1)
LYMPHOCYTES RELATIVE PERCENT: 5 %
MAGNESIUM: 2.1 MG/DL (ref 1.8–2.4)
MCH RBC QN AUTO: 30.6 PG (ref 26–34)
MCHC RBC AUTO-ENTMCNC: 32.7 G/DL (ref 31–36)
MCV RBC AUTO: 93.3 FL (ref 80–100)
MONOCYTES ABSOLUTE: 1.1 K/UL (ref 0–1.3)
MONOCYTES RELATIVE PERCENT: 10.8 %
NEUTROPHILS ABSOLUTE: 8.4 K/UL (ref 1.7–7.7)
NEUTROPHILS RELATIVE PERCENT: 83.7 %
PDW BLD-RTO: 18.2 % (ref 12.4–15.4)
PERFORMED ON: ABNORMAL
PERFORMED ON: NORMAL
PHOSPHORUS: 6 MG/DL (ref 2.5–4.9)
PLATELET # BLD: 133 K/UL (ref 135–450)
PMV BLD AUTO: 8.7 FL (ref 5–10.5)
POTASSIUM SERPL-SCNC: 3.4 MMOL/L (ref 3.5–5.1)
RBC # BLD: 2.76 M/UL (ref 4–5.2)
SODIUM BLD-SCNC: 143 MMOL/L (ref 136–145)
TOTAL PROTEIN: 6.4 G/DL (ref 6.4–8.2)
WBC # BLD: 10.1 K/UL (ref 4–11)

## 2021-09-27 PROCEDURE — 2700000000 HC OXYGEN THERAPY PER DAY

## 2021-09-27 PROCEDURE — 97530 THERAPEUTIC ACTIVITIES: CPT

## 2021-09-27 PROCEDURE — 92526 ORAL FUNCTION THERAPY: CPT

## 2021-09-27 PROCEDURE — 80053 COMPREHEN METABOLIC PANEL: CPT

## 2021-09-27 PROCEDURE — 2060000000 HC ICU INTERMEDIATE R&B

## 2021-09-27 PROCEDURE — 94640 AIRWAY INHALATION TREATMENT: CPT

## 2021-09-27 PROCEDURE — 97162 PT EVAL MOD COMPLEX 30 MIN: CPT

## 2021-09-27 PROCEDURE — 2580000003 HC RX 258: Performed by: STUDENT IN AN ORGANIZED HEALTH CARE EDUCATION/TRAINING PROGRAM

## 2021-09-27 PROCEDURE — 97166 OT EVAL MOD COMPLEX 45 MIN: CPT

## 2021-09-27 PROCEDURE — 6360000002 HC RX W HCPCS: Performed by: STUDENT IN AN ORGANIZED HEALTH CARE EDUCATION/TRAINING PROGRAM

## 2021-09-27 PROCEDURE — 6370000000 HC RX 637 (ALT 250 FOR IP): Performed by: INTERNAL MEDICINE

## 2021-09-27 PROCEDURE — 2500000003 HC RX 250 WO HCPCS

## 2021-09-27 PROCEDURE — 6360000002 HC RX W HCPCS: Performed by: INTERNAL MEDICINE

## 2021-09-27 PROCEDURE — 2580000003 HC RX 258: Performed by: INTERNAL MEDICINE

## 2021-09-27 PROCEDURE — 36415 COLL VENOUS BLD VENIPUNCTURE: CPT

## 2021-09-27 PROCEDURE — 84100 ASSAY OF PHOSPHORUS: CPT

## 2021-09-27 PROCEDURE — 94761 N-INVAS EAR/PLS OXIMETRY MLT: CPT

## 2021-09-27 PROCEDURE — 83735 ASSAY OF MAGNESIUM: CPT

## 2021-09-27 PROCEDURE — 97535 SELF CARE MNGMENT TRAINING: CPT

## 2021-09-27 PROCEDURE — 6370000000 HC RX 637 (ALT 250 FOR IP): Performed by: STUDENT IN AN ORGANIZED HEALTH CARE EDUCATION/TRAINING PROGRAM

## 2021-09-27 PROCEDURE — 99233 SBSQ HOSP IP/OBS HIGH 50: CPT | Performed by: INTERNAL MEDICINE

## 2021-09-27 PROCEDURE — 85025 COMPLETE CBC W/AUTO DIFF WBC: CPT

## 2021-09-27 PROCEDURE — 2500000003 HC RX 250 WO HCPCS: Performed by: STUDENT IN AN ORGANIZED HEALTH CARE EDUCATION/TRAINING PROGRAM

## 2021-09-27 PROCEDURE — 36592 COLLECT BLOOD FROM PICC: CPT

## 2021-09-27 RX ORDER — FUROSEMIDE 10 MG/ML
60 INJECTION INTRAMUSCULAR; INTRAVENOUS 3 TIMES DAILY
Status: DISCONTINUED | OUTPATIENT
Start: 2021-09-27 | End: 2021-09-29

## 2021-09-27 RX ADMIN — HEPARIN SODIUM 5000 UNITS: 5000 INJECTION INTRAVENOUS; SUBCUTANEOUS at 20:24

## 2021-09-27 RX ADMIN — HEPARIN SODIUM 5000 UNITS: 5000 INJECTION INTRAVENOUS; SUBCUTANEOUS at 06:21

## 2021-09-27 RX ADMIN — LABETALOL HYDROCHLORIDE 10 MG: 5 INJECTION, SOLUTION INTRAVENOUS at 19:07

## 2021-09-27 RX ADMIN — Medication 10 ML: at 20:13

## 2021-09-27 RX ADMIN — ONDANSETRON 4 MG: 2 INJECTION INTRAMUSCULAR; INTRAVENOUS at 20:24

## 2021-09-27 RX ADMIN — INSULIN LISPRO 2 UNITS: 100 INJECTION, SOLUTION INTRAVENOUS; SUBCUTANEOUS at 12:16

## 2021-09-27 RX ADMIN — FUROSEMIDE 60 MG: 10 INJECTION, SOLUTION INTRAMUSCULAR; INTRAVENOUS at 20:12

## 2021-09-27 RX ADMIN — HEPARIN SODIUM 5000 UNITS: 5000 INJECTION INTRAVENOUS; SUBCUTANEOUS at 13:52

## 2021-09-27 RX ADMIN — INSULIN LISPRO 1 UNITS: 100 INJECTION, SOLUTION INTRAVENOUS; SUBCUTANEOUS at 20:26

## 2021-09-27 RX ADMIN — Medication 10 ML: at 08:44

## 2021-09-27 RX ADMIN — FUROSEMIDE 60 MG: 10 INJECTION, SOLUTION INTRAMUSCULAR; INTRAVENOUS at 13:52

## 2021-09-27 RX ADMIN — POTASSIUM BICARBONATE 40 MEQ: 391 TABLET, EFFERVESCENT ORAL at 13:52

## 2021-09-27 RX ADMIN — FAMOTIDINE 20 MG: 10 INJECTION, SOLUTION INTRAVENOUS at 08:43

## 2021-09-27 RX ADMIN — PIPERACILLIN AND TAZOBACTAM 3375 MG: 3; .375 INJECTION, POWDER, LYOPHILIZED, FOR SOLUTION INTRAVENOUS at 06:20

## 2021-09-27 ASSESSMENT — PAIN SCALES - GENERAL
PAINLEVEL_OUTOF10: 0

## 2021-09-27 NOTE — PROGRESS NOTES
Physical Therapy    Facility/Department: [facility] ICU  Initial Assessment and Treatment    NAME: Julito Lopez  : 1950  MRN: [id number]    Date of Service: 2021    Discharge Recommendations:  Julito Lopez scored a 15/24 on the AM-PAC short mobility form. Current research shows that an AM-PAC score of 17 or less is typically not associated with a discharge to the patient's home setting. Based on the patient's AM-PAC score and their current functional mobility deficits, it is recommended that the patient have 3-5 sessions per week of Physical Therapy at d/c to increase the patient's independence. Please see assessment section for further patient specific details. PT Equipment Recommendations  Equipment Needed: No    Assessment   Assessment: Pt from home with metabolic acidosis. Pt COVID-19 (+), requiring intubation x9 days in ICU. Pt demonstrates transfers and gait decreased from independent baseline. Increased rest required due to dyspnea. SpO2 decreased to low 80s on 3L just transferring to chair. Pt would benefit from continued skilled PT to increase strength, mobility and activity tolerance prior to returning home. Pt in agreement. Will continue to follow. Treatment Diagnosis: Decreased gait due to metbaolic acidosis. Decision Making: Medium Complexity  Patient Education: Role of PT. Pt verbalized partial understanding and would benefit from ongoing education. REQUIRES PT FOLLOW UP: Yes       Restrictions  Position Activity Restriction  Other position/activity restrictions: Up with assist     Vision/Hearing  Vision:  [facility]  Hearing:  [facility]    Subjective  Chart Reviewed: Yes  Additional Pertinent Hx: Pt to ED 9/15 with respiratory distress. Pt admitted to ICU for metabolic acidosis on vent and CRRT. COVID -19 (+). Pt extubated . CRRT stopped . Diagnosis: Metabolic Acidosis    Subjective  Subjective: Pt found supine in bed. Pt agreeable for PT.   Pain Screening  Patient Currently in Pain: Denies    Orientation  Within Functional Limits    Social/Functional History  Lives With: Alone  Type of Home: Apartment  Home Layout: One level  Home Access: Stairs to enter with rails (6 steps)  Bathroom Shower/Tub: Tub/Shower unit  Bathroom Toilet: Standard  Bathroom Equipment: Shower chair  Home Equipment:  (None)  ADL Assistance: Independent  Homemaking Assistance: Independent  Ambulation Assistance: Independent  Transfer Assistance: Independent  Active : No  Mode of Transportation: Walk  Additional Comments: Pt was living with 2 brothers, but both were found dead in apt last week. \"I can't go back there. \"      Objective  Strength  Strength RLE: WFL  Strength LLE: WFL    Bed mobility  Rolling to Left: Minimal assistance  Rolling to Right: Minimal assistance  Supine to Sit: Minimal assistance (HOB raised, verbal cues)     Transfers  Sit to Stand: Moderate Assistance (from EOB to walker, cues for hand placement, increased assist transitioning hands to walker)  Stand to sit: Minimal Assistance (cues for hand placement and safety techniques)  Bed to Chair: Minimal assistance (stand step pivot with rolling walker)     Ambulation  Device: Rolling Walker  Other Apparatus: O2 (3L)  Assistance: Minimal assistance  Quality of Gait: flexed posture, leg length difference, step-to gait  Gait Deviations: Decreased step height;Decreased step length; Slow Becky  Distance: 3-4 steps to chair  Comments: SpO2 decreased to low 80s on 3L with activity. Pt given cues for pursed lip breathing.   Sats recovered to low 90s after a few min of seated rest.    Balance  Sitting - Static: Good  Sitting - Dynamic: Good  Standing - Static: Fair (CGA with walker support)  Standing - Dynamic: Poor (Min-Mod A with walker for mobility)    Treatment included gait and transfer training with patient education     Plan   Times per week: 2-5  Current Treatment Recommendations: Transfer Training, Gait Training, Functional Mobility Training, Strengthening      Safety Devices  Type of devices: Left in chair, Chair alarm in place, Call light within reach, Nurse notified      AM-PAC Score  AM-PAC Inpatient Mobility Raw Score : 15 (09/27/21 1134)  AM-PAC Inpatient T-Scale Score : 39.45 (09/27/21 1134)  Mobility Inpatient CMS 0-100% Score: 57.7 (09/27/21 1134)  Mobility Inpatient CMS G-Code Modifier : CK (09/27/21 1134)    Goals  Short term goals  Time Frame for Short term goals: Discharge  Short term goal 1: supine <> sit supervision  Short term goal 2: sit <> stand supervision  Short term goal 3: bed <> chair supervision  Short term goal 4: ambulate 100ft with rolling walker supervision  Patient Goals   Patient goals : Return home       Therapy Time   Individual Concurrent Group Co-treatment   Time In 1033         Time Out 1113         Minutes 40         Timed Code Treatment Minutes:  25  Total Treatment Minutes:  [address], PT

## 2021-09-27 NOTE — PROGRESS NOTES
Pt arrived to unit from ICU at this time. Pt is alert and oriented, able to make needs known. Pt is free from distress at this time.

## 2021-09-27 NOTE — PROGRESS NOTES
Occupational Therapy   Occupational Therapy Initial Assessment/Tx Note  Date: 2021   Patient Name: Justen Guerrero  MRN: [id number]     : 1950    Date of Service: 2021     Assessment: Functional independence is decreased from baseline. Pt requires assist for all functional mobility and many ADLs. Activity tolerance is decreased. At baseline, pt lives with her brothers who bath sadly have passed away during her admission. Pt will benefit from inpt OT/PT at d/c to increase independence to allow for d/c home with prn assist.    Discharge Recommendations: Justen Guerrero scored a 16/24 on the AM-PAC ADL Inpatient form. Current research shows that an AM-PAC score of 17 or less is typically not associated with a discharge to the patient's home setting. Based on the patient's AM-PAC score and their current ADL deficits, it is recommended that the patient have 3-5 sessions per week of Occupational Therapy at d/c to increase the patient's independence. Please see assessment section for further patient specific details. OT Equipment Recommendations  Equipment Needed: No    Assessment   Performance deficits / Impairments: Decreased functional mobility ; Decreased ADL status; Decreased strength;Decreased endurance;Decreased balance;Decreased high-level IADLs  Treatment Diagnosis: Decreased activity tolerance, impaired ADLs and mobility  Decision Making: Medium Complexity  REQUIRES OT FOLLOW UP: Yes  Activity Tolerance  Activity Tolerance: Patient Tolerated treatment well  Activity Tolerance: On 2-3L O2. BRYANT noted. spO2 reading somewhat unreliable but perhaps as low as 78% after transfer, gradually improving to 90%. -120s. Safety Devices  Safety Devices in place: Yes  Type of devices: Call light within reach; Chair alarm in place; Left in chair;Nurse notified           Treatment Diagnosis: Decreased activity tolerance, impaired ADLs and mobility      Restrictions  Position Activity Restriction  Other position/activity restrictions: Up with assist    Subjective   General  Chart Reviewed: Yes  Additional Pertinent Hx: 73 y.o. F admitted 9/15 with SOB. Pt in acute respiratory and renal failure secondary to Covid 19 sepsis and likely superimposed bacterial PNA. Intubated on day of admission, CRRT started. Extubated 9/23. No PMHx on file  Family / Caregiver Present: No  Referring Practitioner: Faby Price History  Social/Functional History  Lives With: Alone  Type of Home: Apartment  Home Layout: One level  Home Access: Stairs to enter with rails (6 steps)  Bathroom Shower/Tub: Tub/Shower unit  Bathroom Toilet: Standard  Bathroom Equipment: Shower chair  Home Equipment:  (None)  ADL Assistance: Independent  Homemaking Assistance: Independent  Ambulation Assistance: Independent  Transfer Assistance: Independent  Active : No  Mode of Transportation: Walk  Additional Comments: Pt was living with 2 brothers, but both were found dead in apt last week. \"I can't go back there. \"       Objective        Orientation  Overall Orientation Status: Impaired  Orientation Level: Oriented to place;Oriented to person;Disoriented to time;Oriented to situation     Balance  Sitting Balance: Supervision  Standing Balance: Minimal assistance  Standing Balance  Time: < 1min  Toilet Transfers  Toilet - Technique: Stand step  Equipment Used: Standard bedside commode  Toilet Transfer: Moderate assistance  Toilet Transfers Comments: simulated bed to chair  ADL  Grooming: Modified independent ;Setup  LE Dressing: Maximum assistance  Toileting: Dependent/Total (incontinent of bowel - loose stool)        Bed mobility  Rolling to Right: Minimal assistance  Supine to Sit: Minimal assistance  Transfers  Stand Step Transfers: Moderate assistance (mod sit to stand, min assist steps with rolling walker)  Transfer Comments: significant leg length discrepancy noted during transfer, pt does not wear orthotics/special shoe. Cognition  Overall Cognitive Status: [facility]  Cognition Comment: mild delay in responses           Plan  If pt discharges prior to next tx, this note will serve as d/c summary. Continue per POC if pt does not d/c.     Plan  Times per week: 2-5x  Times per day: Daily  Current Treatment Recommendations: Strengthening, Balance Training, Functional Mobility Training, Endurance Training, Safety Education & Training, Patient/Caregiver Education & Training, Equipment Evaluation, Education, & procurement, Self-Care / ADL    AM-PAC Score  AM-PAC Inpatient Daily Activity Raw Score: 16 (09/27/21 [facility])  AM-PAC Inpatient ADL T-Scale Score : 35.96 (09/27/21 [facility])  ADL Inpatient CMS 0-100% Score: 53.32 (09/27/21 1351)  ADL Inpatient CMS G-Code Modifier : CK (09/27/21 1351)    Goals  Short term goals  Time Frame for Short term goals: by D/C  Short term goal 1:  Increase standing/mobility tolerance to 5 min - Not met  Short term goal 2: Transfer to chair/commode with SBA - Not met  Short term goal 3: Participate in toileting via BSC/toilet - Not met  Short term goal 4: Dress lower body with SBA - Not met       Therapy Time   Individual Concurrent Group Co-treatment   Time In 1033         Time Out 1113         Minutes 40          Timed Code Tx Min: 25  Total Tx Time: 40       Era Wolfe, OT

## 2021-09-27 NOTE — PROGRESS NOTES
Comprehensive Nutrition Assessment    RD did not conduct direct, in-person nutrition evaluation in efforts to reduce exposure and use of PPE for high risk persons, PUI persons, patients who have tested positive for Covid-19 or those awaiting respiratory panel results. EMR was screened for nutrition risk factors, as defined per nutrition standards of care. RECOMMENDATIONS:  1. PO Diet: Continue diet per SLP- Full Liquid diet. 2. ONS- Adding Ensure Enlive w/each meal tray to aid w/meeting pt nutrition needs while on liquid diet. NUTRITION ASSESSMENT:   Type and Reason for Visit:  Type and Reason for Visit: Reassess   Nutritional summary & status: Pt successfully extubated and diet advanced per SLP- currently on full liquid diet 9/26. No PO intake noted yet; RD will add ONS and continue to monitor PO diet advancement and nutritional adequacy.       Patient admitted d/t SOB, chills - covid-19    PMH significant for: CKD, current smoker    MALNUTRITION ASSESSMENT  Context of Malnutrition: Acute Illness   Malnutrition Status: Insufficient data  Findings of the 6 clinical characteristics of malnutrition (Minimum of 2 out of 6 clinical characteristics is required to make the diagnosis of moderate or severe Protein Calorie Malnutrition based on AND/ASPEN Guidelines):  Energy Intake: Less than/equal to 50% of estimated energy requirements    Energy Intake Time: Greater than or equal to 5 days      NUTRITION DIAGNOSIS   · Inadequate oral intake related to impaired respiratory function as evidenced by intake 0-25%, swallow study results      [address] and/or Nutrient Delivery:  Start Oral Nutrition Supplement, Continue Current Diet  Nutrition Education/Counseling:  No recommendation at this time   Goals:  pt will tolerate PO diet per SLP to consume greater than 75% of meals and supplements offered through admission       Nutrition Monitoring and Evaluation:   Food/Nutrient Intake Outcomes:  Food and Nutrient Intake, Supplement Intake  Physical Signs/Symptoms Outcomes:  Biochemical Data     OBJECTIVE DATA: Significant to nutrition assessment  · Nutrition-Focused Physical Findings: Nutrition Related Findings: lbm + loose/brown this AM;   · Labs: Reviewed; Na 131, Phos 2.1, POC Glu 249  · Meds: Reviewed; fentanyl, levo, propofol   · Wounds: Wound Type: None     CURRENT NUTRITION THERAPIES  ADULT DIET; Full Liquid  Adult Oral Nutrition Supplement; Standard High Calorie/High Protein Oral Supplement   PO Intake: Average Meal Intake: Unable to assess   PO Supplement Intake:Average Supplements Intake: None Ordered  IVF: n/a     ANTHROPOMETRICS  Current Height: 5' 4\" (162.6 cm)  Current Weight: 138 lb 0.1 oz (62.6 kg)    Admission weight: 140 lb (63.5 kg)  Ideal Body Weight (lbs) (Calculated): 120 lbs (Ideal Body Weight (Kg) (Calculated): 55 kg  Usual Bodyweight SHELLEY - no wt hx   Weight Changes SHELLEY        BMI BMI (Calculated): 23.7    Wt Readings from Last 50 Encounters:   09/27/21 138 lb 0.1 oz (62.6 kg)       COMPARATIVE STANDARDS  Energy (kcal):  9658-4719 kcal/d (25-30 kcal/kg); Weight Used for Energy Requirements:  Current     Protein (g):  79-92 g/d (1.3-1.5 g/kg); Weight Used for Protein Requirements:  Current      Fluid (ml/day):  4750-5548 mL/d; Method Used for Fluid Requirements:  1 ml/kcal      The patient will still be monitored per nutrition standards of care. Consult dietitian if nutrition interventions essential to patient care is needed.      Marie Mcelroy, [address] [place] [address]  Sina:  [phone]  Office:  [phone]

## 2021-09-27 NOTE — PROGRESS NOTES
Speech Language Pathology  Facility/Department: [facility] ICU  Dysphagia Daily Treatment Note    NAME: Xiang Tom  : 1950  MRN: [id number]    Patient Diagnosis(es):   Patient Active Problem List    Diagnosis Date Noted    Septic shock ([facility] 2021    Acute cystitis without hematuria 2021    Pneumonia of left lower lobe due to infectious organism      novel coronavirus-infected pneumonia ([facility])     Acute respiratory failure with hypoxia ([facility]     Acute renal failure ([facility] 09/15/2021     Recent Chest Xray 21      Coarse multifocal opacities most compatible with viral pneumonia. There is progression in the left lower lobe where there is obscuration of the left hemidiaphragm which is a new finding.       Stable cardiac mediastinal silhouette.       Lines and tubes without change.      Previous MBS; none  Chart reviewed. Medical Diagnosis: Metabolic Acidosis  Treatment Diagnosis: Dysphagia    BSE Impression :  21 Impression  Pt was intubated 9/15-21. Pt has fairly strong voice and strong cough and is coughing up secretions. Pt oriented to person and place and followed simple commands. Oral structures grossly intact, pt with absent top dentition, limited bottom teeth. Pt analyzed with ice chips, water via tsp/cup and straw, puree and solid. Adequate labial seal with cup and straw, no anterior loss. Prolonged mastication with solid, due to limited dentition and fatigue. Initially pt tolerated all with no overt signs of aspiration, with good swallow movement felt upon palpation of anterior neck. However as trials progressed, RR increased from lower 20's to 30. Pt began exhibiting coughing and required suctioning of secretions with yankauer. Discontinued further PO trials due to concern for pt safety  Recommend aggressive oral care with suction toothbrush, and ice chips/tsps of water for comfort and plan     MBS results - not appropriate at this time d/t COVID positive. Will complete FEES as appropriate    Pain:none    Current Diet :Full liquid diet     Treatment:  Pt seen bedside to address the following goals:  1-The patient/caregiver will demonstrate understanding of compensatory strategies for improved swallowing safety  ; Educated pt to purpose of visit, s/s of aspiration, concern if aspiration occurs, explanation as to how intubation can affect swallowing and rationale for NPO, oral care and ice chips. Pt will require cont education /reinforcement  Cont goal    :  Educated pt to reason for visit, what aspiration is, education on strategies for swallowing and diet trial recommendation. Pt highly impulsive with amount of intake despite max verbal cues so recommend nursing feed at this time. Pt requires further reinforcement and education (questionable full understanding and follow through). : pt educated to purpose of visit. Explained desire to assess solids, pt declined, stating she didn't feel like chewing anything. After several minutes into session, pt stated she just found out 2 of her brothers , stated they were found in their house. Pt states she thinks they had covid. Provided emotional support and D/w RN Audelia Cockayne. Cont goal    2- The patient will tolerate repeat BSE when able.    : Pt alert, wanting to eat. Oral care given prior and after with suction swab. Pt with RR averaging around 25, occasional increase to 30 but decreased to 23 if took break. Pt analyzed with ice chips, water via cup/straw, puree and solid. Pt with 2 delayed coughs (around 45 seconds after po trials). No immediate cough/wet vocal quality or throat clearing with any po trials. Able to masticate solid (mildly prolonged due to large bite pt took despite verbal/tactile cues). Good swallow felt upon anterior neck. Recommend trial of full liquid diet with nursing staff feeding for monitoring amount of intake and toleration of diet.     Goal met    New goal  3- Pt will consume PO safely without signs or symptoms of aspiration  9/27: pt analyzed with liquids via cup and straw including 3 ounces of uninterrupted swallows. No overt signs of aspiration with liquids, voice remained clear, good swallow movement upon palpation of anterior neck. Pt declined trials of soft solids, as noted in goal above. Pt stated she doesn't want anything to chew. Explained rationale to determine if able to upgrade diet. Pt cont to decline further trials. Cont full liquids with close monitoring for s/s of aspiration  Cont goal    Patient/Family/Caregiver Education:   As above    Compensatory Strategies:  PO with nursing staff feeding only (to control amount of intake and to monitor for any difficulties. Take breaks between every couple bites/sips  Upright with po   Small amounts at slow rate       Plan:  Continued daily Dysphagia treatment with goals per  plan of care. Diet recommendations:  Full liquid diet- If any s/s of aspiration or if lung status decline emerges, then d/c po until re-assessed by SLP  DC recommendation: TBD closer to discharge  Treatment: 14  D/W nursing, [facility]  Needs met prior to leaving room, call button in reach. Inell Dakins, M.S./[facility]-SLP #[id number]  Pg.  # [phone]  If patient is discharged prior to next treatment, this note will serve as the discharge summary

## 2021-09-27 NOTE — PROGRESS NOTES
CC: Hypoxemia    She is alert and states her breathing is improved. She continues to cough productively and clears secretions with use of the suction wand. She is complaining about not getting anything to eat and wanting to go home. Afebrile  WBC 14.2, not significantly changed. Antibiotic therapy empirically with piperacillin-tazobactam, day 10 today. Completed 10-day course of dexamethasone, today. /87. NSR. S1, S2 normal.  O2 saturation 94-96% on O2 at 3 L/min  Scattered rhonchi on auscultation. No wheezing. Abdomen soft, no tenderness. No peripheral edema. Fluid balance +960 mL in past 24 hours, with urine output 1150 mL. Dialysis last night did not post fluid balance. A&P: Acute respiratory failure has resolved, and oxygen requirement at this time is at low flow level. She has had treatment for lower respiratory infection with empiric antibiotic therapy. It is unlikely that her problem was primarily related to Covid, given her rapid improvement. She has evidence of chronic obstructive airflow disease. She will continue on inhaled bronchodilator therapy. Glucocorticoids have been discontinued. Consider stopping antibiotics after 10 days. She is working with OT/PT. She has some apparent issues with dysphagia, continues to work with speech therapy. She may transfer from the ICU to medical floor.

## 2021-09-27 NOTE — PLAN OF CARE
Nutrition Problem #1: Inadequate oral intake  Intervention: Food and/or Nutrient Delivery: Start Oral Nutrition Supplement, Continue Current Diet  Nutritional Goals: pt will tolerate PO diet per SLP to consume greater than 75% of meals and supplements offered through admission

## 2021-09-27 NOTE — PLAN OF CARE
Problem: Nutrition  Goal: Optimal nutrition therapy  Outcome: Ongoing  Note: Nutrition Problem #1: Inadequate oral intake  Intervention: Food and/or Nutrient Delivery: Continue NPO, Modify Tube Feeding  Nutritional Goals: Pt will tolerate most appropriate form of nutrition to meet >75% of nutrition needs while intubated.

## 2021-09-27 NOTE — PROGRESS NOTES
Kelly Turnerier : [phone]     Fax :[phone]        Renal  Note    Patient:  Julito Lopez  MRN: [id number]    CHIEF COMPLAINT:  SOB, chills    History Obtained From:  electronic medical record      09/27/21    BP in good range   UO much better   Last HD sat   rodger UF well         Past Medical History:     OA (osteoarthritis)    Hypertension    Personal history of tobacco use, presenting hazards to health    Lower limb length difference    Current smoker    Renal and perinephric abscess    Acute renal failure (HCC)    Anemia, unspecified    Routine adult health maintenance    Peptic ulcer, unspecified site, unspecified as acute or chronic, without mention of hemorrhage, perforation, or obstruction    Right hip pain    DDD (degenerative disc disease), lumbosacral    External thrombosed hemorrhoids    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 Medications Prior to Admission:    Prior to Admission medications    Medication Sig Start Date End Date Taking?  Authorizing Provider   albuterol sulfate  (90 Base) MCG/ACT inhaler  9/7/21   Historical Provider, MD   atenolol (TENORMIN) 25 MG tablet Take 25 mg by mouth daily    Historical Provider, MD ibuprofen (ADVIL;MOTRIN) 600 MG tablet TAKE 1 TABLET BY MOUTH THREE TIMES A DAY WITH FOOD 8/2/21   Historical Provider, MD       Allergies:  Patient has no known allergies. Social History:   TOBACCO:   reports that she has been smoking cigarettes. She has been smoking about 0.50 packs per day. She has never used smokeless tobacco.  ETOH:   reports previous alcohol use. OCCUPATION:      Family History:   History reviewed. No pertinent family history. Physical Exam:    Vitals: /72   Pulse 71   Temp 98.1 °F (36.7 °C) (Oral)   Resp 23   Ht 5' 4\" (1.626 m)   Wt 138 lb 0.1 oz (62.6 kg)   SpO2 92%   BMI 23.69 kg/m²   Constitutional:  On 4L NC  HEENT:  ETT in place  Neck: unable to assess JVD  Cardiovascular:  S1, S2 reg  Respiratory: symmetric lung expansion  Abdomen:  +BS, soft, ND  Ext: + lower extremity edema  Skin: dry/intact  CNS: sedated     CBC:   Recent Labs     09/25/21  0456 09/26/21  0535 09/27/21  0545   WBC 13.5* 14.2* 10.1   HGB 8.9* 9.5* 8.4*   * 141 133*     BMP:    Recent Labs     09/25/21  1712 09/26/21  0535 09/27/21  0545   * 138 143   K 4.4 4.0 3.4*   CL 98* 100 107   CO2 19* 22 19*   BUN 55* 37* 46*   CREATININE 5.0* 3.9* 4.9*   GLUCOSE 205* 96 80     Hepatic:   Recent Labs     09/25/21  1712 09/26/21  0535 09/27/21  0545   AST 14* 15 10*   ALT 32 28 21   BILITOT <0.2 0.3 0.3   ALKPHOS 74 126 66     Troponin:   No results for input(s): TROPONINI in the last 72 hours. BNP: No results for input(s): BNP in the last 72 hours. Lipids: No results for input(s): CHOL, HDL in the last 72 hours. Invalid input(s): LDLCALCU  ABGs:   Lab Results   Component Value Date    PHART 7.351 09/24/2021    PO2ART 55.6 09/24/2021    LLN6MNS 44.7 09/24/2021     INR: No results for input(s): INR in the last 72 hours. -----------------------------------------------------------------      Assessment and Plan   1. Acute Renal Failure   W/ ho CKD. Pt non compliant    2.  Acid/Base electrolyte abnormalities    3. Anemia    4. Covid-19 infection    Patient Active Problem List   Diagnosis Code    Acute renal failure ([facility] [address] N17.9    2019 novel coronavirus-infected pneumonia (NCIP) U07.1, J12.82    Acute respiratory failure with hypoxia (HCC) J96.01    Septic shock ([facility]) A41.9, R65.21    Acute cystitis without hematuria N30.00    Pneumonia of left lower lobe due to infectious organism J18.9       Plan   - HD as needed   - lasix IV   - UO good   - replace K   - UF as rodger   - replace lytes   - Monitor UO   - COVID -19 treatment per primary team        Maintain SBP> 90 mmHg   Daily weights   AVOID NSAIDs  Avoid Nephrotoxins  Monitor Intake/Output  Call if significant decrease in urine output         Thank you for allowing us to participate in care of Anabel Vickie Aponte MD   [facility] [address]  Office : [phone]  Fax :[phone]

## 2021-09-27 NOTE — CARE COORDINATION
Spoke to Jose Berger, son, by phone. He wants to be sure he is aware of placement needs of mother. He states he lives in [facility] and would like to have mother in SNF (possibly COVID+ & HD prn at this time- SNF) as close as possible to where he lives. Discharge projected about a week from now and patient will be evaluated by PT/OT to see what needs are for any placement. Patient's son is looking at [place] [address] placement for mother in the long run. As soon as CM knows what level discharge appropriate, CM will call son, Jose Berger, to help choose a SNF/Rehab. CM will continue to follow patient until discharge.  Electronically signed by Merlin Barrett, RN on 9/27/2021 at 11:16 AM

## 2021-09-27 NOTE — PROGRESS NOTES
Progress Note  Admit Date: 9/15/2021              CC: F/U for COVID with respiratory failure    73 y.o. female with pmhx of CKD, current smoker, who presented with 3 days of SOB and increased work of breathing.     In the ED, patient tachypnic with accessory muscle use and hypoxic, SBP in 200s. Placed on Bipap per RT. Patient had lab work that was consistent with acute renal failure. Nephorology was consulted for CRRT. Patient was also severely acidotic and started on Bicarb gtt after one amp push. Broad spectrum abx were started in ED. Nitro gtt started.     Denies any sick contacts, not COVID vaccinated. Has not had any chest pain associated. No orthopnea, PND, swelling. Patient states that she has not had issues with breathing in past. Does not regularly follow with any physicians. Interval History:   Weaned to 2 L     Review of Systems - Negative except  As in HPI. Scheduled Medications:    furosemide  60 mg IntraVENous TID    albuterol-ipratropium  1 puff Inhalation TID    insulin lispro  0-6 Units SubCUTAneous TID WC    insulin lispro  0-3 Units SubCUTAneous Nightly    piperacillin-tazobactam  3,375 mg IntraVENous Q12H    nicotine  1 patch TransDERmal Daily    famotidine (PEPCID) injection  20 mg IntraVENous Daily    sodium chloride flush  5-40 mL IntraVENous 2 times per day    heparin (porcine)  5,000 Units SubCUTAneous 3 times per day      PRN Medications: methyl salicylate-menthol, albuterol sulfate HFA, heparin (porcine), perflutren lipid microspheres, labetalol, OLANZapine, hydrALAZINE, glucose, dextrose, glucagon (rDNA), dextrose, sodium chloride flush, sodium chloride, ondansetron **OR** ondansetron, polyethylene glycol, acetaminophen **OR** acetaminophen  Diet: ADULT DIET;  Full Liquid  Adult Oral Nutrition Supplement; Standard High Calorie/High Protein Oral Supplement    Continuous Infusions:   dextrose 100 mL/hr (09/24/21 0528)    sodium chloride         PHYSICAL EXAM:  BP (!) 168/93   Pulse 111   Temp 97.8 °F (36.6 °C) (Oral)   Resp (!) 39   Ht 5' 4\" (1.626 m)   Wt 138 lb 0.1 oz (62.6 kg)   SpO2 97%   BMI 23.69 kg/m²   Recent Labs     09/26/21  1100 09/26/21  1720 09/26/21  2130 09/27/21  0814 09/27/21  1214   POCGLU 109* 206* 153* 90 211*       Intake/Output Summary (Last 24 hours) at 9/27/2021 1433  Last data filed at 9/27/2021 0600  Gross per 24 hour   Intake 885.14 ml   Output 800 ml   Net 85.14 ml       General appearance: alert, appears stated age and cooperative  Head: Normocephalic, without obvious abnormality, atraumatic  Lungs: Some mild wheezing. No rales  Heart: regular rate and rhythm, S1, S2 normal, no murmur, click, rub or gallop  Abdomen: Mild tenderness, LLQ. No rebound. BS present  Extremities: extremities normal, atraumatic, no cyanosis or edema  Pulses: 2+ and symmetric  Neurologic: Grossly normal    LABS:  Recent Labs     09/25/21  0456 09/26/21  0535 09/27/21  0545   WBC 13.5* 14.2* 10.1   HGB 8.9* 9.5* 8.4*   HCT 27.4* 28.7* 25.7*   * 141 133*                                                                    Recent Labs     09/25/21  1712 09/26/21  0535 09/27/21  0545   * 138 143   K 4.4 4.0 3.4*   CL 98* 100 107   CO2 19* 22 19*   BUN 55* 37* 46*   CREATININE 5.0* 3.9* 4.9*   GLUCOSE 205* 96 80     Recent Labs     09/25/21  1712 09/26/21  0535 09/27/21  0545   AST 14* 15 10*   ALT 32 28 21   BILITOT <0.2 0.3 0.3   ALKPHOS 74 126 66     No results for input(s): TROPONINI in the last 72 hours. No results for input(s): BNP in the last 72 hours. No results for input(s): CHOL, HDL in the last 72 hours. Invalid input(s): LDLCALCU  No results for input(s): INR in the last 72 hours.       Assessment & Plan:    73 y.o. female with CKD, current smoker,admitted 9/15/2021 with COVID, resp failure.        Acute hypoxic/hypercapnic respiratory failure 2/2 Covid-19 PNA  COVID Pneumonia  - Extubated 9/23  - Good sats on NC  - S/p decadron 20 mg I/V for five days (9/17 tp 9/21)  - S/p tocilizumab (9/16  - Complete Decadron   - Wean O2 as able, down to 2 L     Sepsis  - Likely viral sepsis sec to COVID  - Possible superimposed bacterial pneumonia  - Respiratory cultures on  9/22 were negative. - Weaned off vasopressors, sepsis is resolving.  - Completed empiric txt course with Zosyn (9/16 until 9/26)    HTN   - Continue antihypertensives  - Monitor Bps     Acute Anemia  Acute anemia possibly sec to sepsis, infection  Likely chronic component sec to CKD  Hgb stable. Acute renal failure  CKD  Volume overload  Pt is off CRRT  Diuresing with IV lasix  - May not need further HD to manage the pt's volume status if responds to diuretics: Defer to Nephro  - Nephrology following      Hyperglycemia  likely sec to steroids  - Possible underlying DM, A1C 6.6  -LDSSI    The patient and / or the family were informed of the results of any tests, a time was given to answer questions, a plan was proposed and they agreed with plan.   Full Code   Disposition: Transferred from ICU 9/25/2021  Likely can DC in 1-2 days if renal function continues to improve, and responds to diuretics, will discuss timing with nephrology  PT/OT eval pending to guide disposition    Olivia Olivarez DO

## 2021-09-28 LAB
A/G RATIO: 0.7 (ref 1.1–2.2)
ALBUMIN SERPL-MCNC: 2.7 G/DL (ref 3.4–5)
ALP BLD-CCNC: 103 U/L (ref 40–129)
ALT SERPL-CCNC: 21 U/L (ref 10–40)
ANION GAP SERPL CALCULATED.3IONS-SCNC: 18 MMOL/L (ref 3–16)
AST SERPL-CCNC: 13 U/L (ref 15–37)
BASE EXCESS ARTERIAL: -4.3 MMOL/L (ref -3–3)
BASOPHILS ABSOLUTE: 0.1 K/UL (ref 0–0.2)
BASOPHILS RELATIVE PERCENT: 0.8 %
BILIRUB SERPL-MCNC: <0.2 MG/DL (ref 0–1)
BUN BLDV-MCNC: 51 MG/DL (ref 7–20)
CALCIUM SERPL-MCNC: 8 MG/DL (ref 8.3–10.6)
CARBOXYHEMOGLOBIN ARTERIAL: 1.8 % (ref 0–1.5)
CHLORIDE BLD-SCNC: 110 MMOL/L (ref 99–110)
CO2: 18 MMOL/L (ref 21–32)
CREAT SERPL-MCNC: 5.1 MG/DL (ref 0.6–1.2)
EOSINOPHILS ABSOLUTE: 0.1 K/UL (ref 0–0.6)
EOSINOPHILS RELATIVE PERCENT: 0.7 %
GFR AFRICAN AMERICAN: 10
GFR NON-AFRICAN AMERICAN: 8
GLOBULIN: 4 G/DL
GLUCOSE BLD-MCNC: 118 MG/DL (ref 70–99)
GLUCOSE BLD-MCNC: 180 MG/DL (ref 70–99)
GLUCOSE BLD-MCNC: 192 MG/DL (ref 70–99)
GLUCOSE BLD-MCNC: 251 MG/DL (ref 70–99)
GLUCOSE BLD-MCNC: 97 MG/DL (ref 70–99)
HCO3 ARTERIAL: 21 MMOL/L (ref 21–29)
HCT VFR BLD CALC: 30.2 % (ref 36–48)
HEMOGLOBIN, ART, EXTENDED: 6.1 G/DL
HEMOGLOBIN: 9.4 G/DL (ref 12–16)
LYMPHOCYTES ABSOLUTE: 0.9 K/UL (ref 1–5.1)
LYMPHOCYTES RELATIVE PERCENT: 9.1 %
MAGNESIUM: 1.9 MG/DL (ref 1.8–2.4)
MCH RBC QN AUTO: 30.4 PG (ref 26–34)
MCHC RBC AUTO-ENTMCNC: 31.2 G/DL (ref 31–36)
MCV RBC AUTO: 97.4 FL (ref 80–100)
METHEMOGLOBIN ARTERIAL: 0.7 % (ref 0–1.4)
MONOCYTES ABSOLUTE: 1.1 K/UL (ref 0–1.3)
MONOCYTES RELATIVE PERCENT: 11.5 %
NEUTROPHILS ABSOLUTE: 7.3 K/UL (ref 1.7–7.7)
NEUTROPHILS RELATIVE PERCENT: 77.9 %
O2 SAT, ARTERIAL: 94 % (ref 93–100)
PCO2 ARTERIAL: 41.1 MMHG (ref 35–45)
PDW BLD-RTO: 18.8 % (ref 12.4–15.4)
PERFORMED ON: ABNORMAL
PH ARTERIAL: 7.33 (ref 7.35–7.45)
PHOSPHORUS: 6.9 MG/DL (ref 2.5–4.9)
PLATELET # BLD: 149 K/UL (ref 135–450)
PMV BLD AUTO: 9 FL (ref 5–10.5)
PO2 ARTERIAL: 68 MMHG (ref 75–108)
POTASSIUM SERPL-SCNC: 4.2 MMOL/L (ref 3.5–5.1)
RBC # BLD: 3.1 M/UL (ref 4–5.2)
SODIUM BLD-SCNC: 146 MMOL/L (ref 136–145)
TCO2 ARTERIAL: 23 MMOL/L
TOTAL PROTEIN: 6.7 G/DL (ref 6.4–8.2)
WBC # BLD: 9.4 K/UL (ref 4–11)

## 2021-09-28 PROCEDURE — 6370000000 HC RX 637 (ALT 250 FOR IP): Performed by: STUDENT IN AN ORGANIZED HEALTH CARE EDUCATION/TRAINING PROGRAM

## 2021-09-28 PROCEDURE — 82803 BLOOD GASES ANY COMBINATION: CPT

## 2021-09-28 PROCEDURE — 94761 N-INVAS EAR/PLS OXIMETRY MLT: CPT

## 2021-09-28 PROCEDURE — 92526 ORAL FUNCTION THERAPY: CPT | Performed by: SPEECH-LANGUAGE PATHOLOGIST

## 2021-09-28 PROCEDURE — 36415 COLL VENOUS BLD VENIPUNCTURE: CPT

## 2021-09-28 PROCEDURE — 80053 COMPREHEN METABOLIC PANEL: CPT

## 2021-09-28 PROCEDURE — 94640 AIRWAY INHALATION TREATMENT: CPT

## 2021-09-28 PROCEDURE — 2580000003 HC RX 258: Performed by: STUDENT IN AN ORGANIZED HEALTH CARE EDUCATION/TRAINING PROGRAM

## 2021-09-28 PROCEDURE — 83735 ASSAY OF MAGNESIUM: CPT

## 2021-09-28 PROCEDURE — 97116 GAIT TRAINING THERAPY: CPT

## 2021-09-28 PROCEDURE — 2060000000 HC ICU INTERMEDIATE R&B

## 2021-09-28 PROCEDURE — 6360000002 HC RX W HCPCS: Performed by: STUDENT IN AN ORGANIZED HEALTH CARE EDUCATION/TRAINING PROGRAM

## 2021-09-28 PROCEDURE — 84100 ASSAY OF PHOSPHORUS: CPT

## 2021-09-28 PROCEDURE — 2500000003 HC RX 250 WO HCPCS

## 2021-09-28 PROCEDURE — 36600 WITHDRAWAL OF ARTERIAL BLOOD: CPT

## 2021-09-28 PROCEDURE — 99233 SBSQ HOSP IP/OBS HIGH 50: CPT | Performed by: INTERNAL MEDICINE

## 2021-09-28 PROCEDURE — 97530 THERAPEUTIC ACTIVITIES: CPT

## 2021-09-28 PROCEDURE — 85025 COMPLETE CBC W/AUTO DIFF WBC: CPT

## 2021-09-28 PROCEDURE — 6360000002 HC RX W HCPCS: Performed by: INTERNAL MEDICINE

## 2021-09-28 RX ADMIN — Medication 10 ML: at 09:21

## 2021-09-28 RX ADMIN — FAMOTIDINE 20 MG: 10 INJECTION, SOLUTION INTRAVENOUS at 09:19

## 2021-09-28 RX ADMIN — HEPARIN SODIUM 5000 UNITS: 5000 INJECTION INTRAVENOUS; SUBCUTANEOUS at 14:01

## 2021-09-28 RX ADMIN — INSULIN LISPRO 1 UNITS: 100 INJECTION, SOLUTION INTRAVENOUS; SUBCUTANEOUS at 20:37

## 2021-09-28 RX ADMIN — HEPARIN SODIUM 5000 UNITS: 5000 INJECTION INTRAVENOUS; SUBCUTANEOUS at 20:33

## 2021-09-28 RX ADMIN — FUROSEMIDE 60 MG: 10 INJECTION, SOLUTION INTRAMUSCULAR; INTRAVENOUS at 14:01

## 2021-09-28 RX ADMIN — FUROSEMIDE 60 MG: 10 INJECTION, SOLUTION INTRAMUSCULAR; INTRAVENOUS at 20:34

## 2021-09-28 RX ADMIN — Medication 10 ML: at 20:37

## 2021-09-28 RX ADMIN — INSULIN LISPRO 3 UNITS: 100 INJECTION, SOLUTION INTRAVENOUS; SUBCUTANEOUS at 13:58

## 2021-09-28 RX ADMIN — FUROSEMIDE 60 MG: 10 INJECTION, SOLUTION INTRAMUSCULAR; INTRAVENOUS at 09:19

## 2021-09-28 RX ADMIN — INSULIN LISPRO 1 UNITS: 100 INJECTION, SOLUTION INTRAVENOUS; SUBCUTANEOUS at 17:47

## 2021-09-28 RX ADMIN — HEPARIN SODIUM 5000 UNITS: 5000 INJECTION INTRAVENOUS; SUBCUTANEOUS at 05:02

## 2021-09-28 ASSESSMENT — PAIN DESCRIPTION - ORIENTATION: ORIENTATION: MID;LOWER

## 2021-09-28 ASSESSMENT — PAIN DESCRIPTION - PROGRESSION: CLINICAL_PROGRESSION: NOT CHANGED

## 2021-09-28 ASSESSMENT — PAIN SCALES - GENERAL
PAINLEVEL_OUTOF10: 8
PAINLEVEL_OUTOF10: 0

## 2021-09-28 ASSESSMENT — PAIN DESCRIPTION - DESCRIPTORS: DESCRIPTORS: SHARP

## 2021-09-28 ASSESSMENT — PAIN DESCRIPTION - LOCATION: LOCATION: ABDOMEN

## 2021-09-28 ASSESSMENT — PAIN DESCRIPTION - FREQUENCY: FREQUENCY: CONTINUOUS

## 2021-09-28 ASSESSMENT — PAIN DESCRIPTION - PAIN TYPE: TYPE: ACUTE PAIN

## 2021-09-28 ASSESSMENT — PAIN DESCRIPTION - ONSET: ONSET: ON-GOING

## 2021-09-28 ASSESSMENT — PAIN - FUNCTIONAL ASSESSMENT: PAIN_FUNCTIONAL_ASSESSMENT: ACTIVITIES ARE NOT PREVENTED

## 2021-09-28 NOTE — PROGRESS NOTES
Speech Language Pathology  Facility/Department: [facility]  Dysphagia Daily Treatment Note    NAME: Roberto Zapien  : 1950  MRN: [id number]    Patient Diagnosis(es):   Patient Active Problem List    Diagnosis Date Noted    Septic shock ([facility] [address] 2021    Acute cystitis without hematuria 2021    Pneumonia of left lower lobe due to infectious organism      novel coronavirus-infected pneumonia (NCIP)     Acute respiratory failure with hypoxia ([facility] [address]     Acute renal failure ([facility] [address] 09/15/2021     Recent Chest Xray 21      Coarse multifocal opacities most compatible with viral pneumonia. There is progression in the left lower lobe where there is obscuration of the left hemidiaphragm which is a new finding.       Stable cardiac mediastinal silhouette.       Lines and tubes without change.      Previous MBS; none  Chart reviewed. Medical Diagnosis: Metabolic Acidosis  Treatment Diagnosis: Dysphagia    BSE Impression :  21 Impression  Pt was intubated 9/15-21. Pt has fairly strong voice and strong cough and is coughing up secretions. Pt oriented to person and place and followed simple commands. Oral structures grossly intact, pt with absent top dentition, limited bottom teeth. Pt analyzed with ice chips, water via tsp/cup and straw, puree and solid. Adequate labial seal with cup and straw, no anterior loss. Prolonged mastication with solid, due to limited dentition and fatigue. Initially pt tolerated all with no overt signs of aspiration, with good swallow movement felt upon palpation of anterior neck. However as trials progressed, RR increased from lower 20's to 30. Pt began exhibiting coughing and required suctioning of secretions with yankauer.   Discontinued further PO trials due to concern for pt safety  Recommend aggressive oral care with suction toothbrush, and ice chips/tsps of water for comfort and plan     MBS results - not appropriate at this time d/t COVID positive. Will complete FEES as appropriate    Pain:none    Current Diet : Full liquid diet; upgrade to dysphagia II (minced & moist ) + thin liquids    Treatment:  Pt seen bedside to address the following goals:  1-The patient/caregiver will demonstrate understanding of compensatory strategies for improved swallowing safety  ; Educated pt to purpose of visit, s/s of aspiration, concern if aspiration occurs, explanation as to how intubation can affect swallowing and rationale for NPO, oral care and ice chips. Pt will require cont education /reinforcement  Cont goal    :  Educated pt to reason for visit, what aspiration is, education on strategies for swallowing and diet trial recommendation. Pt highly impulsive with amount of intake despite max verbal cues so recommend nursing feed at this time. Pt requires further reinforcement and education (questionable full understanding and follow through). : pt educated to purpose of visit. Explained desire to assess solids, pt declined, stating she didn't feel like chewing anything. After several minutes into session, pt stated she just found out 2 of her brothers , stated they were found in their house. Pt states she thinks they had covid. Provided emotional support and D/w RN Lance Winn. Cont goal  : Educated pt to current diet recommendation and recommendation for upgrade given tolerance of regular solid; pt expressed understanding and agreeable to diet upgrade this date. Expressed desire to remain on softer consistency, therefor recommending dysphagia II (minced & moist). Cont goal    2- The patient will tolerate repeat BSE when able.    : Pt alert, wanting to eat. Oral care given prior and after with suction swab. Pt with RR averaging around 25, occasional increase to 30 but decreased to 23 if took break. Pt analyzed with ice chips, water via cup/straw, puree and solid. Pt with 2 delayed coughs (around 45 seconds after po trials).   No immediate cough/wet vocal quality or throat clearing with any po trials. Able to masticate solid (mildly prolonged due to large bite pt took despite verbal/tactile cues). Good swallow felt upon anterior neck. Recommend trial of full liquid diet with nursing staff feeding for monitoring amount of intake and toleration of diet. Goal met    New goal  3- Pt will consume PO safely without signs or symptoms of aspiration  9/27: pt analyzed with liquids via cup and straw including 3 ounces of uninterrupted swallows. No overt signs of aspiration with liquids, voice remained clear, good swallow movement upon palpation of anterior neck. Pt declined trials of soft solids, as noted in goal above. Pt stated she doesn't want anything to chew. Explained rationale to determine if able to upgrade diet. Pt cont to decline further trials. Cont full liquids with close monitoring for s/s of aspiration  Cont goal  9/28: Pt noted to have congested cough prior to any PO trials; strong and able to clear thick secretions. Observed w/ thin liquids, puree, and regular solid this date; no overt s/s of aspiration. Voice remained clear t/o w/o changes in respirations. Consumed regular solid w/ adequate mastication and clearance of oral cavity; trace lingual residue on L that independently cleared. Recommend upgrade to dysphagia II (minced & moist) + thin liquids w/ close monitoring of respiratory status. Tolerating current diet w/o difficulties; ongoing monitoring for instrumental.  Cont goal    Patient/Family/Caregiver Education:   As above    Compensatory Strategies:  PO with nursing staff feeding only (to control amount of intake and to monitor for any difficulties. Take breaks between every couple bites/sips  Upright with po   Small amounts at slow rate       Plan:  Continued daily Dysphagia treatment with goals per  plan of care.   Diet recommendations:  Upgrade to dysphagia II (minced & moist) + thin liquids If any s/s of

## 2021-09-28 NOTE — PROGRESS NOTES
Physical Therapy  Facility/Department: [place] [address] [facility]U  Daily Treatment Note  NAME: Declan Napoles  : 1950  MRN: [id number]    Date of Service: 2021    Discharge Recommendations:  Declan Napoles scored a 16/24 on the AM-PAC short mobility form. Current research shows that an AM-PAC score of 17 or less is typically not associated with a discharge to the patient's home setting. Based on the patient's AM-PAC score and their current functional mobility deficits, it is recommended that the patient have 3-5 sessions per week of Physical Therapy at d/c to increase the patient's independence. Please see assessment section for further patient specific details. If patient discharges prior to next session this note will serve as a discharge summary. Please see below for the latest assessment towards goals. PT Equipment Recommendations  Other: plan to continue to assess pending progress and likely defer recommendations to next level of care - if patient were to d/c home, PT recommends RW and 24 hr (A)    Assessment   Body structures, Functions, Activity limitations: Decreased functional mobility ; Decreased ADL status; Decreased ROM; Decreased strength;Decreased endurance;Decreased balance  Assessment: Patient demonstrates impaired functional mobility, requiring (A) for all mobility including significant assist for sit -> stand transitions. Patient's mobility is well below her typically (I) baseline. Patient limited by low endurance and dyspnea with exertion. Patient will continue to benefit from additional skilled PT intervention to facilitate safe mobility and to optimize (I) to promote return to prior level of function. Patient lives alone and has 6 KATHLEEN to enter home - from a PT perspective, patient unsafe to d/c home with current level of (A) for mobility.   Treatment Diagnosis: impaired functional mobility  Prognosis: Good  Patient Education: Patient educated on role of PT, use of call light, and PT recommendations - patient verbalizes understanding. Barriers to Learning: none  REQUIRES PT FOLLOW UP: Yes  Activity Tolerance  Activity Tolerance: Patient limited by endurance; Patient limited by fatigue     Patient Diagnosis(es): The primary encounter diagnosis was Pneumonia of left lower lobe due to infectious organism. Diagnoses of Suspected COVID-19 virus infection, Acute respiratory failure with hypoxia ([facility] [address] Acute renal failure, unspecified acute renal failure type ([address] and Metabolic acidosis were also pertinent to this visit. has no past medical history on file. has no past surgical history on file. Restrictions  Restrictions/Precautions  Restrictions/Precautions: Fall Risk (high fall risk)  Position Activity Restriction  Other position/activity restrictions: strict I&O, up with assistance, adult diet - full liquid, droplet plus precautions, covid +  Subjective   General  Chart Reviewed: Yes  Additional Pertinent Hx: Pt to ED 9/15 with respiratory distress. Pt admitted to ICU for metabolic acidosis on vent and CRRT. COVID -19 (+). Pt extubated 9/23. CRRT stopped 9/23. Family / Caregiver Present: No  Subjective  Subjective: Patient denies pain, reports right hip \"bad\" at baseline. General Comment  Comments: Patient supine in bed upon arrival - agreeable to PT. Patient on 2L O2 via nasal cannula upon arrival - maintained throughout session. Orientation  Orientation  Overall Orientation Status: Within Functional Limits    Objective   Bed mobility  Supine to Sit: Minimal assistance (with elevated head of bed and use of rail)  Scooting: Stand by assistance (seated at edge of bed - cues and (A) for right foot placement)  Transfers  Sit to Stand:  Moderate Assistance (from edge of bed to RW; max A from toilet to RW; increased time to perform; cues and (A) for hand placement/sequencing)  Stand to sit: Minimal Assistance (from RW to toilet and to recliner)  Stand Pivot Transfers: Minimal Assistance (with RW)  Comment: effortful mobility, noted right hip dysfunction and leg length discrepancy impacting mobility, especially sit to stand transitions  Ambulation 1  Device: Rolling Walker  Other Apparatus: O2 (2L O2)  Assistance: Minimal assistance  Quality of Gait: forward flexed, leg length difference, right hip dysfunction, step to gait, heavy use of (B) UEs, effortful, SOB noted on 2L, HR 130s  Distance: 10ft x 2 - to/from toilet     Balance  Posture: Fair (forward flexed, rounded shoulders, lateral lean with compensation secondary to right hip)  Sitting - Static: Good  Sitting - Dynamic: Good  Standing - Static: Fair  Standing - Dynamic: Fair;-  Comments: min A with RW for standing mobility, effortful, low endurance               AM-PAC Score  AM-PAC Inpatient Mobility Raw Score : 16 (09/28/21 1300)  AM-PAC Inpatient T-Scale Score : 40.78 (09/28/21 1300)  Mobility Inpatient CMS 0-100% Score: 54.16 (09/28/21 1300)  Mobility Inpatient CMS G-Code Modifier : CK (09/28/21 1300)          Goals  Short term goals  Time Frame for Short term goals: Discharge - all goals ongoing 9/28  Short term goal 1: supine <> sit supervision  Short term goal 2: sit <> stand supervision  Short term goal 3: bed <> chair supervision  Short term goal 4: ambulate 100ft with rolling walker supervision  Patient Goals   Patient goals : \"to get better, go home\"    Plan    Plan  Times per week: 2-5  Current Treatment Recommendations: Transfer Training, Gait Training, Functional Mobility Training, Strengthening, ROM, Balance Training, Home Exercise Program, Safety Education & Training, Patient/Caregiver Education & Training, Equipment Evaluation, Education, & procurement, Endurance Training  Safety Devices  Type of devices: Call light within reach, Chair alarm in place, Left in chair, Nurse notified     Therapy Time   Individual Concurrent Group Co-treatment   Time In 1210         Time Out 1249         Minutes 39 Timed Code Treatment Minutes: 39 minutes    Total Treatment Minutes: 44 Minutes    If patient discharges prior to next treatment, this note will serve as discharge summary.     Jodie Ding PT, DPT #[id number]

## 2021-09-28 NOTE — PROGRESS NOTES
Patient is A&O x3.  O2 2L, sat 97%. No complaints of pain or SOB. Respirations appear to be easy and unlabored. Lungs diminished with expiratory wheezes. Respirations easy with productive yellow cough. Cardiac monitor in place, current rhythm NSR. Right neck vascath and left hand PIV intact. vascath pigtail lumen flushed with brisk blood return noted. Medicated as needed for c/o nausea with zofran IVP. Up with assist to the bathroom/BSC as needed. Tolerating full liquid diet. Plan of care and safety measures reviewed with the patient. Call light in reach and bed alarm in place. Will continue to monitor.   Electronically signed by Sonam Zimmerman RN on 9/27/2021 at 11:32 PM

## 2021-09-28 NOTE — PROGRESS NOTES
Progress Note  Admit Date: 9/15/2021              CC: F/U for COVID with respiratory failure    73 y.o. female with pmhx of CKD, current smoker, who presented with 3 days of SOB and increased work of breathing.     In the ED, patient tachypnic with accessory muscle use and hypoxic, SBP in 200s. Placed on Bipap per RT. Patient had lab work that was consistent with acute renal failure. Nephorology was consulted for CRRT. Patient was also severely acidotic and started on Bicarb gtt after one amp push. Broad spectrum abx were started in ED. Nitro gtt started.     Denies any sick contacts, not COVID vaccinated. Has not had any chest pain associated. No orthopnea, PND, swelling. Patient states that she has not had issues with breathing in past. Does not regularly follow with any physicians. Interval History:   Appears more fatigued today. Has some dry cough. Denies chest pain, abdominal pain, N/V, diarrhea or constipation. Review of Systems - Negative except  As in HPI. Scheduled Medications:    furosemide  60 mg IntraVENous TID    albuterol-ipratropium  1 puff Inhalation TID    insulin lispro  0-6 Units SubCUTAneous TID WC    insulin lispro  0-3 Units SubCUTAneous Nightly    nicotine  1 patch TransDERmal Daily    famotidine (PEPCID) injection  20 mg IntraVENous Daily    sodium chloride flush  5-40 mL IntraVENous 2 times per day    heparin (porcine)  5,000 Units SubCUTAneous 3 times per day      PRN Medications: methyl salicylate-menthol, albuterol sulfate HFA, heparin (porcine), perflutren lipid microspheres, labetalol, OLANZapine, hydrALAZINE, glucose, dextrose, glucagon (rDNA), dextrose, sodium chloride flush, sodium chloride, ondansetron **OR** ondansetron, polyethylene glycol, acetaminophen **OR** acetaminophen  Diet: ADULT DIET;  Full Liquid  Adult Oral Nutrition Supplement; Standard High Calorie/High Protein Oral Supplement    Continuous Infusions:   dextrose 100 mL/hr (09/24/21 0528)   Rosalino sodium chloride         PHYSICAL EXAM:  BP (!) 157/70   Pulse 106   Temp 98.6 °F (37 °C) (Oral)   Resp 18   Ht 5' 4\" (1.626 m)   Wt 137 lb 9.1 oz (62.4 kg)   SpO2 94%   BMI 23.61 kg/m²   Recent Labs     09/27/21  1214 09/27/21  1709 09/27/21  2025 09/28/21  0800 09/28/21  1212   POCGLU 211* 127* 196* 118* 251*       Intake/Output Summary (Last 24 hours) at 9/28/2021 1407  Last data filed at 9/28/2021 0910  Gross per 24 hour   Intake 700 ml   Output --   Net 700 ml       General appearance: alert, appears stated age and cooperative  Head: Normocephalic, without obvious abnormality, atraumatic  Lungs: Some mild wheezing. No rales  Heart: regular rate and rhythm, S1, S2 normal, no murmur, click, rub or gallop  Abdomen: Mild tenderness, LLQ. No rebound. BS present  Extremities: extremities normal, atraumatic, no cyanosis or edema  Pulses: 2+ and symmetric  Neurologic: Grossly normal    LABS:  Recent Labs     09/26/21  0535 09/27/21  0545 09/28/21  0449   WBC 14.2* 10.1 9.4   HGB 9.5* 8.4* 9.4*   HCT 28.7* 25.7* 30.2*    133* 149                                                                    Recent Labs     09/26/21  0535 09/27/21  0545 09/28/21  0449    143 146*   K 4.0 3.4* 4.2    107 110   CO2 22 19* 18*   BUN 37* 46* 51*   CREATININE 3.9* 4.9* 5.1*   GLUCOSE 96 80 97     Recent Labs     09/26/21  0535 09/27/21  0545 09/28/21  0449   AST 15 10* 13*   ALT 28 21 21   BILITOT 0.3 0.3 <0.2   ALKPHOS 126 66 103     No results for input(s): TROPONINI in the last 72 hours. No results for input(s): BNP in the last 72 hours. No results for input(s): CHOL, HDL in the last 72 hours. Invalid input(s): LDLCALCU  No results for input(s): INR in the last 72 hours. Assessment & Plan:    73 y.o. female with CKD, current smoker,admitted 9/15/2021 with COVID, resp failure.      Acute hypoxic/hypercapnic respiratory failure 2/2 Covid-19 PNA  COVID Pneumonia  - Extubated 9/23  - Good sats on NC  - S/p decadron 20 mg I/V for five days (9/17 tp 9/21)  - S/p tocilizumab (9/16  - Complete Decadron   - Wean O2 as able, down to 2 L     Sepsis  - Likely viral sepsis sec to COVID  - Possible superimposed bacterial pneumonia  - Respiratory cultures on  9/22 were negative. - Weaned off vasopressors, sepsis is resolving.  - Completed empiric txt course with Zosyn (9/16 until 9/26)    HTN   - Continue antihypertensives  - Monitor Bps     Acute Anemia  Acute anemia possibly sec to sepsis, infection  Likely chronic component sec to CKD  Hgb stable    Acute renal failure  CKD  Volume overload  Pt is off CRRT  Diuresing with IV lasix  - May not need further HD to manage the pt's volume status if responds to diuretics: Defer to Nephro  - Nephrology following, discussed renal function. She may still need dialysis. Will monitor response to IV lasix for 1-2 more days. Hyperglycemia  likely sec to steroids  - Possible underlying DM, A1C 6.6  - LDSSI    Full Code   Disposition: Transferred from ICU 9/25/2021  PT/OT eval ongoing, may need placement with HD.      Adelaida Moreno,

## 2021-09-28 NOTE — PROGRESS NOTES
Patient in bed resting comfortably. Respirations steady and unlabored. No signs of respiratory or cardiac distress. No complaints of pain at this time. No needs at this time. Call light in reach and bed alarm in place. Will continue to monitor.   Electronically signed by John Mistry RN on 9/28/2021 at 1:44 AM

## 2021-09-28 NOTE — PLAN OF CARE
Problem: Falls - Risk of:  Goal: Will remain free from falls  Description: Will remain free from falls  Outcome: Ongoing  Note: Pt has slip resistant on, bed in the lowest position, call light near the pt, and bed alarm on. Problem: Falls - Risk of:  Goal: Absence of physical injury  Description: Absence of physical injury  Outcome: Ongoing  Note: Pt remained free from injury for the duration of the shift. Problem: Airway Clearance - Ineffective  Goal: Achieve or maintain patent airway  Outcome: Ongoing  Note: Pt encouraged to cough, deep breath, and reposition to promote optimal lung function. Problem: Gas Exchange - Impaired  Goal: Absence of hypoxia  Outcome: Ongoing  Note: Pt's vitals remained stable while pt was on 2L O2. Pt encouraged to cough, deep breath, and reposition to promote optimal lung function. Problem: Gas Exchange - Impaired  Goal: Promote optimal lung function  Outcome: Ongoing  Note: Pt encouraged to cough, deep breath, and reposition to promote optimal lung function. Problem: Isolation Precautions - Risk of Spread of Infection  Goal: Prevent transmission of infection  Outcome: Ongoing     Problem: Nutrition Deficits  Goal: Optimize nutritional status  Outcome: Ongoing  Note: Pt encouraged to eat nutritious diet to promote optimal healing/recovery.

## 2021-09-29 ENCOUNTER — APPOINTMENT (OUTPATIENT)
Dept: GENERAL RADIOLOGY | Age: 71
DRG: 870 | End: 2021-09-29
Payer: MEDICARE

## 2021-09-29 LAB
A/G RATIO: 0.7 (ref 1.1–2.2)
ALBUMIN SERPL-MCNC: 2.8 G/DL (ref 3.4–5)
ALP BLD-CCNC: 72 U/L (ref 40–129)
ALT SERPL-CCNC: 18 U/L (ref 10–40)
ANION GAP SERPL CALCULATED.3IONS-SCNC: 18 MMOL/L (ref 3–16)
AST SERPL-CCNC: 10 U/L (ref 15–37)
BASOPHILS ABSOLUTE: 0 K/UL (ref 0–0.2)
BASOPHILS RELATIVE PERCENT: 0.4 %
BILIRUB SERPL-MCNC: 0.3 MG/DL (ref 0–1)
BUN BLDV-MCNC: 52 MG/DL (ref 7–20)
CALCIUM SERPL-MCNC: 8.6 MG/DL (ref 8.3–10.6)
CHLORIDE BLD-SCNC: 108 MMOL/L (ref 99–110)
CO2: 19 MMOL/L (ref 21–32)
CREAT SERPL-MCNC: 5 MG/DL (ref 0.6–1.2)
EOSINOPHILS ABSOLUTE: 0.1 K/UL (ref 0–0.6)
EOSINOPHILS RELATIVE PERCENT: 0.7 %
GFR AFRICAN AMERICAN: 10
GFR NON-AFRICAN AMERICAN: 9
GLOBULIN: 3.9 G/DL
GLUCOSE BLD-MCNC: 133 MG/DL (ref 70–99)
GLUCOSE BLD-MCNC: 135 MG/DL (ref 70–99)
GLUCOSE BLD-MCNC: 203 MG/DL (ref 70–99)
GLUCOSE BLD-MCNC: 209 MG/DL (ref 70–99)
GLUCOSE BLD-MCNC: 209 MG/DL (ref 70–99)
HCT VFR BLD CALC: 26.3 % (ref 36–48)
HEMOGLOBIN: 8.4 G/DL (ref 12–16)
LYMPHOCYTES ABSOLUTE: 0.9 K/UL (ref 1–5.1)
LYMPHOCYTES RELATIVE PERCENT: 8 %
MAGNESIUM: 1.7 MG/DL (ref 1.8–2.4)
MCH RBC QN AUTO: 30.3 PG (ref 26–34)
MCHC RBC AUTO-ENTMCNC: 31.9 G/DL (ref 31–36)
MCV RBC AUTO: 95.1 FL (ref 80–100)
MONOCYTES ABSOLUTE: 1.1 K/UL (ref 0–1.3)
MONOCYTES RELATIVE PERCENT: 10.1 %
NEUTROPHILS ABSOLUTE: 9.2 K/UL (ref 1.7–7.7)
NEUTROPHILS RELATIVE PERCENT: 80.8 %
PDW BLD-RTO: 18.7 % (ref 12.4–15.4)
PERFORMED ON: ABNORMAL
PHOSPHORUS: 5.2 MG/DL (ref 2.5–4.9)
PLATELET # BLD: 157 K/UL (ref 135–450)
PMV BLD AUTO: 9 FL (ref 5–10.5)
POTASSIUM SERPL-SCNC: 4 MMOL/L (ref 3.5–5.1)
RBC # BLD: 2.76 M/UL (ref 4–5.2)
SODIUM BLD-SCNC: 145 MMOL/L (ref 136–145)
TOTAL PROTEIN: 6.7 G/DL (ref 6.4–8.2)
WBC # BLD: 11.3 K/UL (ref 4–11)

## 2021-09-29 PROCEDURE — 84100 ASSAY OF PHOSPHORUS: CPT

## 2021-09-29 PROCEDURE — 2500000003 HC RX 250 WO HCPCS

## 2021-09-29 PROCEDURE — 2580000003 HC RX 258: Performed by: STUDENT IN AN ORGANIZED HEALTH CARE EDUCATION/TRAINING PROGRAM

## 2021-09-29 PROCEDURE — 2060000000 HC ICU INTERMEDIATE R&B

## 2021-09-29 PROCEDURE — 92526 ORAL FUNCTION THERAPY: CPT

## 2021-09-29 PROCEDURE — 71045 X-RAY EXAM CHEST 1 VIEW: CPT

## 2021-09-29 PROCEDURE — 97535 SELF CARE MNGMENT TRAINING: CPT

## 2021-09-29 PROCEDURE — 97530 THERAPEUTIC ACTIVITIES: CPT

## 2021-09-29 PROCEDURE — 36592 COLLECT BLOOD FROM PICC: CPT

## 2021-09-29 PROCEDURE — 94761 N-INVAS EAR/PLS OXIMETRY MLT: CPT

## 2021-09-29 PROCEDURE — 85025 COMPLETE CBC W/AUTO DIFF WBC: CPT

## 2021-09-29 PROCEDURE — 83735 ASSAY OF MAGNESIUM: CPT

## 2021-09-29 PROCEDURE — 97110 THERAPEUTIC EXERCISES: CPT

## 2021-09-29 PROCEDURE — 6370000000 HC RX 637 (ALT 250 FOR IP): Performed by: STUDENT IN AN ORGANIZED HEALTH CARE EDUCATION/TRAINING PROGRAM

## 2021-09-29 PROCEDURE — 2700000000 HC OXYGEN THERAPY PER DAY

## 2021-09-29 PROCEDURE — 99233 SBSQ HOSP IP/OBS HIGH 50: CPT | Performed by: INTERNAL MEDICINE

## 2021-09-29 PROCEDURE — 6360000002 HC RX W HCPCS: Performed by: INTERNAL MEDICINE

## 2021-09-29 PROCEDURE — 94640 AIRWAY INHALATION TREATMENT: CPT

## 2021-09-29 PROCEDURE — 80053 COMPREHEN METABOLIC PANEL: CPT

## 2021-09-29 PROCEDURE — 6360000002 HC RX W HCPCS: Performed by: STUDENT IN AN ORGANIZED HEALTH CARE EDUCATION/TRAINING PROGRAM

## 2021-09-29 RX ORDER — FUROSEMIDE 10 MG/ML
40 INJECTION INTRAMUSCULAR; INTRAVENOUS 3 TIMES DAILY
Status: DISCONTINUED | OUTPATIENT
Start: 2021-09-29 | End: 2021-10-04

## 2021-09-29 RX ADMIN — FUROSEMIDE 60 MG: 10 INJECTION, SOLUTION INTRAMUSCULAR; INTRAVENOUS at 14:17

## 2021-09-29 RX ADMIN — Medication 10 ML: at 10:01

## 2021-09-29 RX ADMIN — ACETAMINOPHEN 650 MG: 325 TABLET ORAL at 09:41

## 2021-09-29 RX ADMIN — FAMOTIDINE 20 MG: 10 INJECTION, SOLUTION INTRAVENOUS at 09:42

## 2021-09-29 RX ADMIN — HEPARIN SODIUM 5000 UNITS: 5000 INJECTION INTRAVENOUS; SUBCUTANEOUS at 07:11

## 2021-09-29 RX ADMIN — INSULIN LISPRO 2 UNITS: 100 INJECTION, SOLUTION INTRAVENOUS; SUBCUTANEOUS at 12:48

## 2021-09-29 RX ADMIN — Medication 10 ML: at 21:32

## 2021-09-29 RX ADMIN — INSULIN LISPRO 2 UNITS: 100 INJECTION, SOLUTION INTRAVENOUS; SUBCUTANEOUS at 09:42

## 2021-09-29 RX ADMIN — FUROSEMIDE 40 MG: 10 INJECTION, SOLUTION INTRAMUSCULAR; INTRAVENOUS at 21:32

## 2021-09-29 RX ADMIN — HEPARIN SODIUM 5000 UNITS: 5000 INJECTION INTRAVENOUS; SUBCUTANEOUS at 14:17

## 2021-09-29 RX ADMIN — INSULIN LISPRO 1 UNITS: 100 INJECTION, SOLUTION INTRAVENOUS; SUBCUTANEOUS at 21:32

## 2021-09-29 RX ADMIN — FUROSEMIDE 60 MG: 10 INJECTION, SOLUTION INTRAMUSCULAR; INTRAVENOUS at 09:40

## 2021-09-29 ASSESSMENT — PAIN - FUNCTIONAL ASSESSMENT
PAIN_FUNCTIONAL_ASSESSMENT: PREVENTS OR INTERFERES SOME ACTIVE ACTIVITIES AND ADLS
PAIN_FUNCTIONAL_ASSESSMENT: PREVENTS OR INTERFERES SOME ACTIVE ACTIVITIES AND ADLS

## 2021-09-29 ASSESSMENT — PAIN SCALES - GENERAL
PAINLEVEL_OUTOF10: 10
PAINLEVEL_OUTOF10: 0
PAINLEVEL_OUTOF10: 10
PAINLEVEL_OUTOF10: 0
PAINLEVEL_OUTOF10: 0
PAINLEVEL_OUTOF10: 10
PAINLEVEL_OUTOF10: 10

## 2021-09-29 ASSESSMENT — PAIN DESCRIPTION - DESCRIPTORS
DESCRIPTORS: ACHING
DESCRIPTORS: ACHING

## 2021-09-29 ASSESSMENT — PAIN DESCRIPTION - LOCATION
LOCATION: HAND
LOCATION: HAND

## 2021-09-29 ASSESSMENT — PAIN DESCRIPTION - PROGRESSION
CLINICAL_PROGRESSION: NOT CHANGED
CLINICAL_PROGRESSION: NOT CHANGED

## 2021-09-29 ASSESSMENT — PAIN DESCRIPTION - FREQUENCY
FREQUENCY: CONTINUOUS
FREQUENCY: CONTINUOUS

## 2021-09-29 ASSESSMENT — PAIN DESCRIPTION - PAIN TYPE: TYPE: CHRONIC PAIN

## 2021-09-29 ASSESSMENT — PAIN SCALES - WONG BAKER
WONGBAKER_NUMERICALRESPONSE: 0
WONGBAKER_NUMERICALRESPONSE: 0

## 2021-09-29 ASSESSMENT — PAIN DESCRIPTION - ONSET: ONSET: ON-GOING

## 2021-09-29 NOTE — CARE COORDINATION
Case Management Assessment           Daily Note                 Date/ Time of Note: 9/29/2021 2:54 PM         Note completed by: Tani Holly RN    Patient Name: Preet Jaeger  YOB: 1950    Diagnosis:Metabolic acidosis [W16.8]  Acute renal failure (ARF) ([facility] [N17.9]  Acute respiratory failure with hypoxia ([facility] [J96.01]  Acute renal failure, unspecified acute renal failure type ([facility] [N17.9]  Pneumonia of left lower lobe due to infectious organism [J18.9]  Suspected COVID-19 virus infection [Z20.822]  Patient Admission Status: Inpatient    Date of [place]  8:00 PM Length of Stay: 14 GLOS: GMLOS: 12.4    Current Plan of Care: 2L O2, monitoring need for HD  ________________________________________________________________________________________  PT AM-PAC: 16 / 24 per last evaluation on: 9/28    OT AM-PAC: 14 / 24 per last evaluation on: 9/28    DME Needs for discharge: defer  ________________________________________________________________________________________  Discharge Plan: SNF: tbd    Tentative discharge date: 1-2 days      Current barriers to discharge: medical clearance    Referrals completed: SNF: [facility], [address] [place] [address], [address], [facility] [place], [address] [facility] and [address] [facility] [address]    Resources/ information provided: SNF List  ________________________________________________________________________________________  Case Management Notes:  CM spoke with patient's son, Esperanza Go, over the phone as pt did not answer phone. We discussed SNF options (email SNF list) on the [address] [place] [address] S side. He is agreeable to referrals. CM faxed multiple referrals. Izabel Tomlinson and her family were provided with choice of provider; she and her family are in agreement with the discharge plan.     Care Transition Patient: Mariela Holly RN  [facility]  Case Management Department  Ph: [phone]  Fax: [phone]

## 2021-09-29 NOTE — PROGRESS NOTES
Progress Note  Admit Date: 9/15/2021              CC: F/U for COVID with respiratory failure    73 y.o. female with pmhx of CKD, current smoker, who presented with 3 days of SOB and increased work of breathing.     In the ED, patient tachypnic with accessory muscle use and hypoxic, SBP in 200s. Placed on Bipap per RT. Patient had lab work that was consistent with acute renal failure. Nephorology was consulted for CRRT. Patient was also severely acidotic and started on Bicarb gtt after one amp push. Broad spectrum abx were started in ED. Nitro gtt started.     Denies any sick contacts, not COVID vaccinated. Has not had any chest pain associated. No orthopnea, PND, swelling. Patient states that she has not had issues with breathing in past. Does not regularly follow with any physicians. Interval History:   Less fatigued, still has some cough but feels better today. Urine output is good. Review of Systems - Negative except  As in HPI. Scheduled Medications:    furosemide  60 mg IntraVENous TID    albuterol-ipratropium  1 puff Inhalation TID    insulin lispro  0-6 Units SubCUTAneous TID WC    insulin lispro  0-3 Units SubCUTAneous Nightly    nicotine  1 patch TransDERmal Daily    famotidine (PEPCID) injection  20 mg IntraVENous Daily    sodium chloride flush  5-40 mL IntraVENous 2 times per day    heparin (porcine)  5,000 Units SubCUTAneous 3 times per day      PRN Medications: methyl salicylate-menthol, albuterol sulfate HFA, heparin (porcine), perflutren lipid microspheres, labetalol, OLANZapine, hydrALAZINE, glucose, dextrose, glucagon (rDNA), dextrose, sodium chloride flush, sodium chloride, ondansetron **OR** ondansetron, polyethylene glycol, acetaminophen **OR** acetaminophen  Diet: Adult Oral Nutrition Supplement; Standard High Calorie/High Protein Oral Supplement  ADULT DIET;  Dysphagia - Minced and Moist    Continuous Infusions:   dextrose 100 mL/hr (09/24/21 0528)    sodium chloride PHYSICAL EXAM:  BP (!) 156/99   Pulse 87   Temp 97.4 °F (36.3 °C) (Oral)   Resp 20   Ht 5' 4\" (1.626 m)   Wt 137 lb 9.1 oz (62.4 kg)   SpO2 95%   BMI 23.61 kg/m²   Recent Labs     09/28/21  1212 09/28/21  1659 09/28/21  2037 09/29/21  0938 09/29/21  1129   POCGLU 251* 192* 180* 203* 209*       Intake/Output Summary (Last 24 hours) at 9/29/2021 1453  Last data filed at 9/29/2021 1427  Gross per 24 hour   Intake 880 ml   Output 1475 ml   Net -595 ml       General appearance: alert, appears stated age and cooperative  Head: Normocephalic, without obvious abnormality, atraumatic  Lungs: Some mild wheezing. No rales  Heart: regular rate and rhythm, S1, S2 normal, no murmur, click, rub or gallop  Abdomen: Mild tenderness, LLQ. No rebound. BS present  Extremities: extremities normal, atraumatic, no cyanosis or edema  Pulses: 2+ and symmetric  Neurologic: Grossly normal    LABS:  Recent Labs     09/27/21  0545 09/28/21  0449 09/29/21  0430   WBC 10.1 9.4 11.3*   HGB 8.4* 9.4* 8.4*   HCT 25.7* 30.2* 26.3*   * 149 157                                                                    Recent Labs     09/27/21  0545 09/28/21  0449 09/29/21  0430    146* 145   K 3.4* 4.2 4.0    110 108   CO2 19* 18* 19*   BUN 46* 51* 52*   CREATININE 4.9* 5.1* 5.0*   GLUCOSE 80 97 135*     Recent Labs     09/27/21  0545 09/28/21  0449 09/29/21  0430   AST 10* 13* 10*   ALT 21 21 18   BILITOT 0.3 <0.2 0.3   ALKPHOS 66 103 72     No results for input(s): TROPONINI in the last 72 hours. No results for input(s): BNP in the last 72 hours. No results for input(s): CHOL, HDL in the last 72 hours. Invalid input(s): LDLCALCU  No results for input(s): INR in the last 72 hours. Assessment & Plan:    73 y.o. female with CKD, current smoker,admitted 9/15/2021 with COVID, resp failure.      Acute hypoxic/hypercapnic respiratory failure 2/2 Covid-19 PNA  COVID Pneumonia  - Extubated 9/23  - S/p decadron 20 mg I/V for five days (9/17 tp 9/21)  - S/p tocilizumab (9/16  - Complete Decadron   - Wean O2 as able, stable at 2 L   - continue diuresis    Sepsis  - Likely viral sepsis sec to COVID  - Possible superimposed bacterial pneumonia  - Respiratory cultures on  9/22 were negative. - Weaned off vasopressors, sepsis is resolving.  - Completed empiric txt course with Zosyn (9/16 until 9/26)    HTN   - Continue antihypertensives  - Monitor Bps     Acute Anemia  Acute anemia possibly sec to sepsis, infection  Likely chronic component sec to CKD  Hgb stable    Acute renal failure  CKD  Volume overload  Pt is off CRRT  Diuresing with IV lasix  - May not need further HD to manage the pt's volume status if responds to diuretics: Defer to Nephro  - She may still need dialysis but labs today are encouraging.   - IV lasix to continue today, may be able to avoid HD    Hyperglycemia  likely sec to steroids  - Possible underlying DM, A1C 6.6  - LDSSI    Full Code   Disposition: Transferred from ICU 9/25/2021  PT/OT eval ongoing, planning for placement.      Taylor Pollack, DO

## 2021-09-29 NOTE — PROGRESS NOTES
Pt started coughing up bright red blood with clots after transfer from chair to bed via zoë hat[place]. Charge nurse notified and dr neri. Dr. Renzo Haywood to bedside. Stat CXR ordered. SQ Heparin held. Pulmonology consulted. Pt stopped coughing up blood at this time.  Vitals stable as documented in flowsheets  Electronically signed by Enrique Pressley RN on 9/29/2021 at 5:16 PM

## 2021-09-29 NOTE — PROGRESS NOTES
Physical Therapy  Facility/Department: [place] [address] [facility]  Daily Treatment Note  NAME: Justen Guerrero  : 1950  MRN: [id number]    Date of Service: 2021    Discharge Recommendations:  Justen Guerrero scored a 8/24 on the AM-PAC short mobility form. Current research shows that an AM-PAC score of 17 or less is typically not associated with a discharge to the patient's home setting. Based on the patient's AM-PAC score and their current functional mobility deficits, it is recommended that the patient have 3-5 sessions per week of Physical Therapy at d/c to increase the patient's independence. Please see assessment section for further patient specific details. If patient discharges prior to next session this note will serve as a discharge summary. Please see below for the latest assessment towards goals. Patient would benefit from continued therapy after discharge   PT Equipment Recommendations  Equipment Needed:  (defer)    Assessment   Body structures, Functions, Activity limitations: Decreased functional mobility ; Decreased ADL status; Decreased ROM; Decreased strength;Decreased endurance;Decreased balance  Assessment: Pt requiring significantly more assistance today as compared to the last 2 days. Balance as well as cognition also more impaired. Pt will require further inpt PT upon D/C. Will follow per plan of care. Treatment Diagnosis: impaired functional mobility  Prognosis: Fair  PT Education: PT Role;Transfer Training;General Safety  REQUIRES PT FOLLOW UP: Yes  Activity Tolerance  Activity Tolerance: Patient limited by endurance; Patient limited by fatigue;Patient limited by cognitive status     Patient Diagnosis(es): The primary encounter diagnosis was Pneumonia of left lower lobe due to infectious organism.  Diagnoses of Suspected COVID-19 virus infection, Acute respiratory failure with hypoxia ([facility] Acute renal failure, unspecified acute renal failure type ([facility] and Metabolic acidosis were also Poor  Exercises  Knee Long Arc Quad: x 10 bilat  Comments: attempted other exercises, however pt not following commands well.                                                                             AM-PAC Score  AM-PAC Inpatient Mobility Raw Score : 8 (09/29/21 1731)  AM-PAC Inpatient T-Scale Score : 28.52 (09/29/21 1731)  Mobility Inpatient CMS 0-100% Score: 86.62 (09/29/21 1731)  Mobility Inpatient CMS G-Code Modifier : CM (09/29/21 1731)          Goals  Short term goals  Time Frame for Short term goals: Discharge  Short term goal 1: supine <> sit supervision  Short term goal 2: sit <> stand supervision  Short term goal 3: bed <> chair supervision  Short term goal 4: ambulate 100ft with rolling walker supervision  Patient Goals   Patient goals : \"to get better, go home\"    Plan    Plan  Times per week: 2-5  Current Treatment Recommendations: Transfer Training, Gait Training, Functional Mobility Training, Strengthening, ROM, Balance Training, Home Exercise Program, Safety Education & Training, Patient/Caregiver Education & Training, Equipment Evaluation, Education, & procurement, Endurance Training  Safety Devices  Type of devices: Bed alarm in place, Call light within reach, Left in bed, Nurse notified (3 RN's in room)     Therapy Time   Individual Concurrent Group Co-treatment   Time In 1600         Time Out 1645         Minutes [address] PT

## 2021-09-29 NOTE — PROGRESS NOTES
Patient in bed resting comfortably. Respirations steady and unlabored. No signs of respiratory or cardiac distress. No complaints of pain at this time. No needs at this time. Call light in reach and bed alarm in place. Will continue to monitor.   Electronically signed by Dennie Salen, RN on 9/29/2021 at 1:37 AM

## 2021-09-29 NOTE — PROGRESS NOTES
Occupational Therapy  Daily Treatment Note  Patient Name: Rita Amezcua  MRN: [id number]     Assessment: Pt having more pain today, specifically to R hand and noted to have a possibly new cyst-like nodule over her carpal tunnel (RN made aware). Cognition a bit more impaired and pt needed more assist for transfers out of bed and to chair. Will continue OT. Anticipate need for continued inpt OT/PT at d/c. Discharge Recommendations: Rita Amezcua scored a 14/24 on the AM-PAC ADL Inpatient form. Current research shows that an AM-PAC score of 17 or less is typically not associated with a discharge to the patient's home setting. Based on the patient's AM-PAC score and their current ADL deficits, it is recommended that the patient have 3-5 sessions per week of Occupational Therapy at d/c to increase the patient's independence. Please see assessment section for further patient specific details. Equipment Needs:  No    Chart Reviewed: Yes   Restrictions/Precautions: Fall Risk (high fall risk) Other position/activity restrictions: strict I&O, up with assistance, adult diet - full liquid, droplet plus precautions, covid +   Additional Pertinent Hx: Pt to ED 9/15 with respiratory distress. Pt admitted to ICU for metabolic acidosis on vent and CRRT. COVID -19 (+). Pt extubated 9/23. CRRT stopped 9/23. Treatment Diagnosis: Decreased activity tolerance, impaired ADLs and mobility    Subjective: Pt in bed on entry. Worried about her lost cell phone, which was immediately discovered just under her blankets. Pt pleasant and cooperative.      Pain: Yes, body aches and R hand very painful, noted to have a fairly large nodule over her carpal tunnel which was sore to touch and new per pt, RN informed and stated she would take a look    Objective:    Cognition/Orientation: Impaired - alert, partially oriented, follows simple commands with increased time and repetition, impaired problem solving, motor planning, initiation (difficulty performing simple ROM exercises even after demonstration)    Bed mobility   Supine to sit: Max assist, mod cues  Scooting: Mod assist (for trunk support while scooting)    Functional Mobility   Sit to Stand: Mod assist, mod cues (positions R foot far posterior but likely baseline due to leg length discrepancy)  Stand to Sit: Min assist, min cues  Bed to Chair Transfer: Mod assist, mod cues, walker  Time in stance: 1 min     ADLs   Grooming: Mod I, set up (to wash face)    UE Exercises - required max cues for correct form, attention to task, and overall participation  Finger flex/ext: 5 reps  Elbow flex/ext: 5 reps  Shoulder flex/ext: 5 reps AAROM  Ankle pumps: 8 reps  Shoulder rolls: 10 reps    Activity Tolerance: Fair - limited more by pain today + decreased cognition. SOB after transfer - spO2 in 90s at rest and after transferring to EOB, but unable to obtain a reading in chair. Pt recovered with seated rest.     Patient Education: Activity promotion, transfers, exercises - needs reinforcement    Safety Devices in Place: left in chair, alarm on, reclined, needs in reach, RN aware    Goals:  Short term goals  Time Frame for Short term goals: by D/C  Short term goal 1: Increase standing/mobility tolerance to 5 min - Not met  Short term goal 2: Transfer to chair/commode with SBA - Not met  Short term goal 3: Participate in toileting via BSC/toilet - Not met  Short term goal 4: Dress lower body with SBA - Not met         Plan:      Times per week: 2-5x   Times per day: Daily    If patient is discharged prior to next treatment, this note will serve as the discharge summary.     Therapy Time   Individual Concurrent Group Co-treatment   Time In 1028         Time Out 1106         Minutes 38             Timed Code Treatment Minutes: 38  Total Treatment Time: 45       Era Wolfe, OT

## 2021-09-29 NOTE — PROGRESS NOTES
Patient is A&O x3.  O2 1L, sat 96%. No complaints of pain or SOB. Respirations appear to be easy and unlabored. Lungs diminished with expiratory wheezes. Respirations easy with productive yellow cough. Cardiac monitor in place, current rhythm NSR. Right neck vascath and left hand PIV intact. vascath pigtail lumen flushed with brisk blood return noted. Medicated as needed for c/o nausea with zofran IVP. Up with assist to the bathroom/BSC as needed. Camera to be placed in room for safety reasons. Patient tries to get out of bed without assistance. Tolerating full liquid diet. Plan of care and safety measures reviewed with the patient. Call light in reach and bed alarm in place. Will continue to monitor.   Electronically signed by Angel Valencia RN on 9/28/2021 at 11:32 PM

## 2021-09-29 NOTE — PROGRESS NOTES
This RN gave pt update to toño Kamara.  All questions answered    Electronically signed by Sophie Giles RN on 9/29/2021 at 6:51 PM

## 2021-09-29 NOTE — PROGRESS NOTES
Speech Language Pathology  Facility/Department: [facility] PCU  Dysphagia Daily Treatment Note    NAME: Terry Mcmillan  : 1950  MRN: [id number]    Patient Diagnosis(es):   Patient Active Problem List    Diagnosis Date Noted    Septic shock ([facility] 2021    Acute cystitis without hematuria 2021    Pneumonia of left lower lobe due to infectious organism      novel coronavirus-infected pneumonia (NCIP)     Acute respiratory failure with hypoxia ([facility]     Acute renal failure ([facility] [facility] 09/15/2021     Recent Chest Xray 21      Coarse multifocal opacities most compatible with viral pneumonia. There is progression in the left lower lobe where there is obscuration of the left hemidiaphragm which is a new finding.       Stable cardiac mediastinal silhouette.       Lines and tubes without change.      Previous MBS; none  Chart reviewed. Medical Diagnosis: Metabolic Acidosis  Treatment Diagnosis: Dysphagia    BSE Impression :  21 Impression  Pt was intubated 9/15-21. Pt has fairly strong voice and strong cough and is coughing up secretions. Pt oriented to person and place and followed simple commands. Oral structures grossly intact, pt with absent top dentition, limited bottom teeth. Pt analyzed with ice chips, water via tsp/cup and straw, puree and solid. Adequate labial seal with cup and straw, no anterior loss. Prolonged mastication with solid, due to limited dentition and fatigue. Initially pt tolerated all with no overt signs of aspiration, with good swallow movement felt upon palpation of anterior neck. However as trials progressed, RR increased from lower 20's to 30. Pt began exhibiting coughing and required suctioning of secretions with yankauer. Discontinued further PO trials due to concern for pt safety  Recommend aggressive oral care with suction toothbrush, and ice chips/tsps of water for comfort and plan     MBS results - n/a at this time d/t COVID positive.  FEES if indicated    Pain:none    Current Diet : Full liquid diet; upgrade to dysphagia II (minced & moist ) + thin liquids (recommended /)     Treatment:  Pt seen bedside to address the following goals:  1-The patient/caregiver will demonstrate understanding of compensatory strategies for improved swallowing safety  ; Educated pt to purpose of visit, s/s of aspiration, concern if aspiration occurs, explanation as to how intubation can affect swallowing and rationale for NPO, oral care and ice chips. Pt will require cont education /reinforcement  Cont goal    :  Educated pt to reason for visit, what aspiration is, education on strategies for swallowing and diet trial recommendation. Pt highly impulsive with amount of intake despite max verbal cues so recommend nursing feed at this time. Pt requires further reinforcement and education (questionable full understanding and follow through). : pt educated to purpose of visit. Explained desire to assess solids, pt declined, stating she didn't feel like chewing anything. After several minutes into session, pt stated she just found out 2 of her brothers , stated they were found in their house. Pt states she thinks they had covid. Provided emotional support and D/w RN Love Carissa. Cont goal  : Educated pt to current diet recommendation and recommendation for upgrade given tolerance of regular solid; pt expressed understanding and agreeable to diet upgrade this date. Expressed desire to remain on softer consistency, therefor recommending dysphagia II (minced & moist). Cont goal  :   Pt educated to purpose of visit, denies any problems with swallowing. Educated recommendation for upgrade of diet (not changed from last session). Reviewed strategies for improved safety of swallowing, including importance of sitting fully upright and ensuring clearance of oral cavity.  Pt stated comprehension  Cont goal    2- The patient will tolerate repeat BSE when able.    9/25: Pt alert, wanting to eat. Oral care given prior and after with suction swab. Pt with RR averaging around 25, occasional increase to 30 but decreased to 23 if took break. Pt analyzed with ice chips, water via cup/straw, puree and solid. Pt with 2 delayed coughs (around 45 seconds after po trials). No immediate cough/wet vocal quality or throat clearing with any po trials. Able to masticate solid (mildly prolonged due to large bite pt took despite verbal/tactile cues). Good swallow felt upon anterior neck. Recommend trial of full liquid diet with nursing staff feeding for monitoring amount of intake and toleration of diet. Goal met    3- Pt will consume PO safely without signs or symptoms of aspiration  9/27: pt analyzed with liquids via cup and straw including 3 ounces of uninterrupted swallows. No overt signs of aspiration with liquids, voice remained clear, good swallow movement upon palpation of anterior neck. Pt declined trials of soft solids, as noted in goal above. Pt stated she doesn't want anything to chew. Explained rationale to determine if able to upgrade diet. Pt cont to decline further trials. Cont full liquids with close monitoring for s/s of aspiration  Cont goal  9/28: Pt noted to have congested cough prior to any PO trials; strong and able to clear thick secretions. Observed w/ thin liquids, puree, and regular solid this date; no overt s/s of aspiration. Voice remained clear t/o w/o changes in respirations. Consumed regular solid w/ adequate mastication and clearance of oral cavity; trace lingual residue on L that independently cleared. Recommend upgrade to dysphagia II (minced & moist) + thin liquids w/ close monitoring of respiratory status. Tolerating current diet w/o difficulties; ongoing monitoring for instrumental.  Cont goal  9/29; d/w RN who states pt has had no respiratory decline, doing fairly well with swallowing.  diet remains full liquids, d/w RN recommendation to upgrade to minced and moist from last session. Pt analyzed with puree, thin liquids via cup and straw as well as soft solid. Pt demonstrated adequate labial seal with no anterior loss of bolus. No cough/throat clearing or change in voice occurred. Good swallow movement felt upon palpation of anterior neck. Pt demonstrated prolonged mastication with solid texture, mild lingual residue noted, cleared with liquid wash. Recommend dysphagia II minced and moist / thin liquids   Cont goal    Patient/Family/Caregiver Education:   As above    Compensatory Strategies:  PO with nursing staff feeding only (to control amount of intake and to monitor for any difficulties. Upright with po   Small amounts at slow rate    Plan:  Continued daily Dysphagia treatment with goals per  plan of care. Diet recommendations:  Upgrade to dysphagia II (minced & moist) + thin liquids If any s/s of aspiration or if lung status decline emerges, then d/c po until re-assessed by SLP  DC recommendation: TBD closer to discharge  Treatment:12  D/W nursing, Good and [address] met prior to leaving room, call button in reach. Hayder Salcido M.S./[facility]-SL[facility] #[id number]  Pg.  # [phone]  If patient is discharged prior to next treatment, this note will serve as the discharge summary

## 2021-09-29 NOTE — PROGRESS NOTES
Creatinine stable today  Urine output is good  We will keep holding dialysis for now  We will monitor for need for dialysis.

## 2021-09-29 NOTE — PROGRESS NOTES
Kelly Turnerier : [phone]     Fax :[phone]        Renal  Note    Patient:  Julito Lopez  MRN: [id number]    CHIEF COMPLAINT:  SOB, chills    History Obtained From:  electronic medical record      09/28/21    BP in good range   UO much better   On lasix           Past Medical History:     OA (osteoarthritis)    Hypertension    Personal history of tobacco use, presenting hazards to health    Lower limb length difference    Current smoker    Renal and perinephric abscess    Acute renal failure (HCC)    Anemia, unspecified    Routine adult health maintenance    Peptic ulcer, unspecified site, unspecified as acute or chronic, without mention of hemorrhage, perforation, or obstruction    Right hip pain    DDD (degenerative disc disease), lumbosacral    External thrombosed hemorrhoids    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 Medications Prior to Admission:    Prior to Admission medications    Medication Sig Start Date End Date Taking?  Authorizing Provider   albuterol sulfate  (90 Base) MCG/ACT inhaler  9/7/21   Historical Provider, MD   atenolol (TENORMIN) 25 MG tablet Take 25 mg by mouth daily    Historical Provider, MD   ibuprofen (ADVIL;MOTRIN) 600 MG tablet TAKE 1 TABLET BY MOUTH THREE TIMES A DAY WITH FOOD 8/2/21   Historical Provider, MD       Allergies:  Patient has no known allergies. Social History:   TOBACCO:   reports that she has been smoking cigarettes. She has been smoking about 0.50 packs per day. She has never used smokeless tobacco.  ETOH:   reports previous alcohol use. OCCUPATION:      Family History:   History reviewed. No pertinent family history. Physical Exam:    Vitals: /73   Pulse 99   Temp 99.8 °F (37.7 °C) (Oral)   Resp 20   Ht 5' 4\" (1.626 m)   Wt 137 lb 9.1 oz (62.4 kg)   SpO2 91%   BMI 23.61 kg/m²   Constitutional:  On 4L NC  HEENT:  ETT in place  Neck: unable to assess JVD  Cardiovascular:  S1, S2 reg  Respiratory: symmetric lung expansion  Abdomen:  +BS, soft, ND  Ext: + lower extremity edema  Skin: dry/intact  CNS: sedated     CBC:   Recent Labs     09/26/21  0535 09/27/21  0545 09/28/21 0449   WBC 14.2* 10.1 9.4   HGB 9.5* 8.4* 9.4*    133* 149     BMP:    Recent Labs     09/26/21  0535 09/27/21  0545 09/28/21 0449    143 146*   K 4.0 3.4* 4.2    107 110   CO2 22 19* 18*   BUN 37* 46* 51*   CREATININE 3.9* 4.9* 5.1*   GLUCOSE 96 80 97     Hepatic:   Recent Labs     09/26/21 0535 09/27/21  0545 09/28/21 0449   AST 15 10* 13*   ALT 28 21 21   BILITOT 0.3 0.3 <0.2   ALKPHOS 126 66 103     Troponin:   No results for input(s): TROPONINI in the last 72 hours. BNP: No results for input(s): BNP in the last 72 hours. Lipids: No results for input(s): CHOL, HDL in the last 72 hours. Invalid input(s): LDLCALCU  ABGs:   Lab Results   Component Value Date    PHART 7.325 09/28/2021    PO2ART 68.0 09/28/2021    EOI8PIL 41.1 09/28/2021     INR: No results for input(s): INR in the last 72 hours. -----------------------------------------------------------------      Assessment and Plan   1. Acute Renal Failure   W/ ho CKD. Pt non compliant    2.  Acid/Base electrolyte abnormalities    3. Anemia    4. Covid-19 infection    Patient Active Problem List   Diagnosis Code    Acute renal failure ([facility] [facility] N17.9    2019 novel coronavirus-infected pneumonia (NCIP) U07.1, J12.82    Acute respiratory failure with hypoxia (HCC) J96.01    Septic shock ([facility]) A41.9, R65.21    Acute cystitis without hematuria N30.00    Pneumonia of left lower lobe due to infectious organism J18.9       Plan   - HD as needed   - lasix IV   - UO good   - replace K   - UF as rodger   - replace lytes   - Monitor UO   - COVID -19 treatment per primary team        Maintain SBP> 90 mmHg   Daily weights   AVOID NSAIDs  Avoid Nephrotoxins  Monitor Intake/Output  Call if significant decrease in urine output         Thank you for allowing us to participate in care of [address] [place] [address] R. Lannette Mortimer, MD   [facility]  Office : [phone]  Fax :[phone]

## 2021-09-30 LAB
A/G RATIO: 0.7 (ref 1.1–2.2)
ALBUMIN SERPL-MCNC: 3 G/DL (ref 3.4–5)
ALP BLD-CCNC: 117 U/L (ref 40–129)
ALT SERPL-CCNC: 15 U/L (ref 10–40)
ANION GAP SERPL CALCULATED.3IONS-SCNC: 16 MMOL/L (ref 3–16)
AST SERPL-CCNC: 9 U/L (ref 15–37)
BASOPHILS ABSOLUTE: 0.1 K/UL (ref 0–0.2)
BASOPHILS RELATIVE PERCENT: 0.6 %
BILIRUB SERPL-MCNC: 0.3 MG/DL (ref 0–1)
BUN BLDV-MCNC: 55 MG/DL (ref 7–20)
CALCIUM SERPL-MCNC: 9.3 MG/DL (ref 8.3–10.6)
CHLORIDE BLD-SCNC: 109 MMOL/L (ref 99–110)
CO2: 19 MMOL/L (ref 21–32)
CREAT SERPL-MCNC: 4.8 MG/DL (ref 0.6–1.2)
EOSINOPHILS ABSOLUTE: 0.1 K/UL (ref 0–0.6)
EOSINOPHILS RELATIVE PERCENT: 0.6 %
GFR AFRICAN AMERICAN: 11
GFR NON-AFRICAN AMERICAN: 9
GLOBULIN: 4.4 G/DL
GLUCOSE BLD-MCNC: 137 MG/DL (ref 70–99)
GLUCOSE BLD-MCNC: 217 MG/DL (ref 70–99)
HCT VFR BLD CALC: 27.6 % (ref 36–48)
HEMOGLOBIN: 8.9 G/DL (ref 12–16)
LYMPHOCYTES ABSOLUTE: 0.8 K/UL (ref 1–5.1)
LYMPHOCYTES RELATIVE PERCENT: 7.6 %
MAGNESIUM: 1.8 MG/DL (ref 1.8–2.4)
MCH RBC QN AUTO: 30.7 PG (ref 26–34)
MCHC RBC AUTO-ENTMCNC: 32.4 G/DL (ref 31–36)
MCV RBC AUTO: 95 FL (ref 80–100)
MONOCYTES ABSOLUTE: 1 K/UL (ref 0–1.3)
MONOCYTES RELATIVE PERCENT: 9.1 %
NEUTROPHILS ABSOLUTE: 9 K/UL (ref 1.7–7.7)
NEUTROPHILS RELATIVE PERCENT: 82.1 %
PDW BLD-RTO: 18.3 % (ref 12.4–15.4)
PERFORMED ON: ABNORMAL
PHOSPHORUS: 4.8 MG/DL (ref 2.5–4.9)
PLATELET # BLD: 178 K/UL (ref 135–450)
PMV BLD AUTO: 8.9 FL (ref 5–10.5)
POTASSIUM SERPL-SCNC: 4.5 MMOL/L (ref 3.5–5.1)
RBC # BLD: 2.91 M/UL (ref 4–5.2)
SODIUM BLD-SCNC: 144 MMOL/L (ref 136–145)
TOTAL PROTEIN: 7.4 G/DL (ref 6.4–8.2)
WBC # BLD: 11 K/UL (ref 4–11)

## 2021-09-30 PROCEDURE — 99233 SBSQ HOSP IP/OBS HIGH 50: CPT | Performed by: INTERNAL MEDICINE

## 2021-09-30 PROCEDURE — 83735 ASSAY OF MAGNESIUM: CPT

## 2021-09-30 PROCEDURE — 6360000002 HC RX W HCPCS: Performed by: INTERNAL MEDICINE

## 2021-09-30 PROCEDURE — 85025 COMPLETE CBC W/AUTO DIFF WBC: CPT

## 2021-09-30 PROCEDURE — 6360000002 HC RX W HCPCS: Performed by: STUDENT IN AN ORGANIZED HEALTH CARE EDUCATION/TRAINING PROGRAM

## 2021-09-30 PROCEDURE — 2060000000 HC ICU INTERMEDIATE R&B

## 2021-09-30 PROCEDURE — 36415 COLL VENOUS BLD VENIPUNCTURE: CPT

## 2021-09-30 PROCEDURE — 6370000000 HC RX 637 (ALT 250 FOR IP): Performed by: STUDENT IN AN ORGANIZED HEALTH CARE EDUCATION/TRAINING PROGRAM

## 2021-09-30 PROCEDURE — 2500000003 HC RX 250 WO HCPCS

## 2021-09-30 PROCEDURE — 80053 COMPREHEN METABOLIC PANEL: CPT

## 2021-09-30 PROCEDURE — 84100 ASSAY OF PHOSPHORUS: CPT

## 2021-09-30 PROCEDURE — 97110 THERAPEUTIC EXERCISES: CPT

## 2021-09-30 PROCEDURE — 2580000003 HC RX 258: Performed by: STUDENT IN AN ORGANIZED HEALTH CARE EDUCATION/TRAINING PROGRAM

## 2021-09-30 PROCEDURE — 94640 AIRWAY INHALATION TREATMENT: CPT

## 2021-09-30 PROCEDURE — 97530 THERAPEUTIC ACTIVITIES: CPT

## 2021-09-30 RX ADMIN — FUROSEMIDE 40 MG: 10 INJECTION, SOLUTION INTRAMUSCULAR; INTRAVENOUS at 21:52

## 2021-09-30 RX ADMIN — Medication 10 ML: at 10:09

## 2021-09-30 RX ADMIN — INSULIN LISPRO 1 UNITS: 100 INJECTION, SOLUTION INTRAVENOUS; SUBCUTANEOUS at 21:52

## 2021-09-30 RX ADMIN — HEPARIN SODIUM 5000 UNITS: 5000 INJECTION INTRAVENOUS; SUBCUTANEOUS at 21:52

## 2021-09-30 RX ADMIN — Medication 10 ML: at 21:52

## 2021-09-30 RX ADMIN — FUROSEMIDE 40 MG: 10 INJECTION, SOLUTION INTRAMUSCULAR; INTRAVENOUS at 10:06

## 2021-09-30 RX ADMIN — FUROSEMIDE 40 MG: 10 INJECTION, SOLUTION INTRAMUSCULAR; INTRAVENOUS at 16:46

## 2021-09-30 RX ADMIN — FAMOTIDINE 20 MG: 10 INJECTION, SOLUTION INTRAVENOUS at 10:06

## 2021-09-30 RX ADMIN — MENTHOL, METHYL SALICYLATE: 10; 15 CREAM TOPICAL at 21:52

## 2021-09-30 ASSESSMENT — PAIN SCALES - GENERAL
PAINLEVEL_OUTOF10: 0

## 2021-09-30 NOTE — PROGRESS NOTES
Physical Therapy  Facility/Department: [place] [address] PCU  Daily Treatment Note  NAME: Fransisco Carvalho  : 1950  MRN: [id number]    Date of Service: 2021    Discharge Recommendations:  Fransisco Carvalho scored a  on the AM-PAC short mobility form. Current research shows that an AM-PAC score of 17 or less is typically not associated with a discharge to the patient's home setting. Based on the patient's AM-PAC score and their current functional mobility deficits, it is recommended that the patient have 3-5 sessions per week of Physical Therapy at d/c to increase the patient's independence. Please see assessment section for further patient specific details. If patient discharges prior to next session this note will serve as a discharge summary. Please see below for the latest assessment towards goals. Patient would benefit from continued therapy after discharge   PT Equipment Recommendations  Equipment Needed:  (defer)    Assessment   Body structures, Functions, Activity limitations: Decreased functional mobility ; Decreased ADL status; Decreased ROM; Decreased strength;Decreased endurance;Decreased balance  Assessment: Pt requires assist x 2 & use of zoë stedy for transfer to chair. Unable to ambulate. Recommend further inpt PT upon D/C. Will follow per plan of care. Treatment Diagnosis: impaired functional mobility  Prognosis: Fair  PT Education: Goals;PT Role;Plan of Care;Transfer Training;General Safety; Functional Mobility Training  REQUIRES PT FOLLOW UP: Yes  Activity Tolerance  Activity Tolerance: Patient limited by endurance; Patient limited by fatigue;Patient limited by cognitive status     Patient Diagnosis(es): The primary encounter diagnosis was Pneumonia of left lower lobe due to infectious organism.  Diagnoses of Suspected COVID-19 virus infection, Acute respiratory failure with hypoxia ([address] Acute renal failure, unspecified acute renal failure type ([facility] [address] and Metabolic acidosis were also pertinent to this visit. has no past medical history on file. has no past surgical history on file. Restrictions  Restrictions/Precautions  Restrictions/Precautions: Fall Risk  Position Activity Restriction  Other position/activity restrictions: strict I&O, up with assistance, adult diet - full liquid, droplet plus precautions, covid +  Subjective   General  Chart Reviewed: Yes  Additional Pertinent Hx: Pt to ED 9/15 with respiratory distress. Pt admitted to ICU for metabolic acidosis on vent and CRRT. COVID -19 (+). Pt extubated 9/23. CRRT stopped 9/23. Family / Caregiver Present: No  Subjective  Subjective: Pt supine in bed & agreeable to PT. Pt minimally verbal with flat affect & demonstrates slow processing. General Comment  Comments: Pt found to be incontinent of loose stool. RN assisted with clean up prior to transfer. Pain Screening  Patient Currently in Pain: Yes (right hand. not rated.)  Vital Signs  Patient Currently in Pain: Yes (right hand. not rated.)               Objective   Bed mobility  Rolling to Left: Moderate assistance  Rolling to Right: Maximum assistance  Supine to Sit: Maximum assistance (HOB flat.)  Scooting: Maximal assistance (seated)  Transfers  Sit to Stand: 2 Person Assistance (max A x 2 from bed to zoë stedy)  Stand to sit: Maximum Assistance  Bed to Chair: Dependent/Total (via zoë stedy)  Comment: attempted sit->stand from chair to RW & pt unable despite max A x 1. pt SOB with failed attempt.            Exercises  Heelslides: x 10 bilat  Hip Flexion: x 10 bilat  Hip Abduction: x 10 bilat  Knee Long Arc Quad: x 10 bilat  Ankle Pumps: x 10 bilat                                                                             AM-PAC Score  AM-PAC Inpatient Mobility Raw Score : 8 (09/30/21 1656)  AM-PAC Inpatient T-Scale Score : 28.52 (09/30/21 1656)  Mobility Inpatient CMS 0-100% Score: 86.62 (09/30/21 1656)  Mobility Inpatient CMS G-Code Modifier : CM (09/30/21 1656) Goals  Short term goals  Time Frame for Short term goals: Discharge  Short term goal 1: supine <> sit supervision  Short term goal 2: sit <> stand supervision  Short term goal 3: bed <> chair supervision  Short term goal 4: ambulate 100ft with rolling walker supervision  Patient Goals   Patient goals : \"to get better, go home\"    Plan    Plan  Times per week: 2-5  Current Treatment Recommendations: Transfer Training, Gait Training, Functional Mobility Training, Strengthening, ROM, Balance Training, Home Exercise Program, Safety Education & Training, Patient/Caregiver Education & Training, Equipment Evaluation, Education, & procurement, Endurance Training  Safety Devices  Type of devices: Call light within reach, Chair alarm in place, Left in chair, Nurse notified     Therapy Time   Individual Concurrent Group Co-treatment   Time In 1515         Time Out 1600         Minutes [address] [place] [address],

## 2021-09-30 NOTE — PROGRESS NOTES
[facility] : [phone]     Fax :[phone]        Renal  Note    Patient:  Maira Cramer  MRN: [id number]    CHIEF COMPLAINT:  SOB, chills    History Obtained From:  electronic medical record      09/29/21    BP in good range   UO much better   On lasix           Past Medical History:     OA (osteoarthritis)    Hypertension    Personal history of tobacco use, presenting hazards to health    Lower limb length difference    Current smoker    Renal and perinephric abscess    Acute renal failure (HCC)    Anemia, unspecified    Routine adult health maintenance    Peptic ulcer, unspecified site, unspecified as acute or chronic, without mention of hemorrhage, perforation, or obstruction    Right hip pain    DDD (degenerative disc disease), lumbosacral    External thrombosed hemorrhoids    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 Medications Prior to Admission:    Prior to Admission medications    Medication Sig Start Date End Date Taking?  Authorizing Provider   albuterol sulfate  (90 Base) MCG/ACT inhaler  9/7/21   Historical Provider, MD   atenolol (TENORMIN) 25 MG tablet Take 25 mg by mouth daily    Historical Provider, MD   ibuprofen (ADVIL;MOTRIN) 600 MG tablet TAKE 1 TABLET BY MOUTH THREE TIMES A DAY WITH FOOD 8/2/21   Historical Provider, MD       Allergies:  Patient has no known allergies. Social History:   TOBACCO:   reports that she has been smoking cigarettes. She has been smoking about 0.50 packs per day. She has never used smokeless tobacco.  ETOH:   reports previous alcohol use. OCCUPATION:      Family History:   History reviewed. No pertinent family history. Physical Exam:    Vitals: BP (!) 168/75   Pulse 89   Temp 97.9 °F (36.6 °C) (Axillary)   Resp 15   Ht 5' 4\" (1.626 m)   Wt 137 lb 9.1 oz (62.4 kg)   SpO2 93%   BMI 23.61 kg/m²   Constitutional:  On 4L NC  HEENT:  ETT in place  Neck: unable to assess JVD  Cardiovascular:  S1, S2 reg  Respiratory: symmetric lung expansion  Abdomen:  +BS, soft, ND  Ext: + lower extremity edema  Skin: dry/intact  CNS: sedated     CBC:   Recent Labs     09/27/21  0545 09/28/21 0449 09/29/21 0430   WBC 10.1 9.4 11.3*   HGB 8.4* 9.4* 8.4*   * 149 157     BMP:    Recent Labs     09/27/21  0545 09/28/21 0449 09/29/21  0430    146* 145   K 3.4* 4.2 4.0    110 108   CO2 19* 18* 19*   BUN 46* 51* 52*   CREATININE 4.9* 5.1* 5.0*   GLUCOSE 80 97 135*     Hepatic:   Recent Labs     09/27/21  0545 09/28/21 0449 09/29/21  0430   AST 10* 13* 10*   ALT 21 21 18   BILITOT 0.3 <0.2 0.3   ALKPHOS 66 103 72     Troponin:   No results for input(s): TROPONINI in the last 72 hours. BNP: No results for input(s): BNP in the last 72 hours. Lipids: No results for input(s): CHOL, HDL in the last 72 hours. Invalid input(s): LDLCALCU  ABGs:   Lab Results   Component Value Date    PHART 7.325 09/28/2021    PO2ART 68.0 09/28/2021    MBW9AZE 41.1 09/28/2021     INR: No results for input(s): INR in the last 72 hours. -----------------------------------------------------------------      Assessment and Plan   1. Acute Renal Failure   W/ ho CKD. Pt non compliant    2.  Acid/Base electrolyte abnormalities    3. Anemia    4. Covid-19 infection    Patient Active Problem List   Diagnosis Code    Acute renal failure ([facility] [address] N17.9    2019 novel coronavirus-infected pneumonia (NCIP) U07.1, J12.82    Acute respiratory failure with hypoxia (HCC) J96.01    Septic shock ([facility]) A41.9, R65.21    Acute cystitis without hematuria N30.00    Pneumonia of left lower lobe due to infectious organism J18.9       Plan   - HD as needed   - lasix IV   - UO good   - replace K   - UF as rodger   - replace lytes   - Monitor UO   - COVID -19 treatment per primary team        Maintain SBP> 90 mmHg   Daily weights   AVOID NSAIDs  Avoid Nephrotoxins  Monitor Intake/Output  Call if significant decrease in urine output         Thank you for allowing us to participate in care of [address] [place] [address] FLO Darden MD   [facility]  Office : [phone]  Fax :[phone]

## 2021-09-30 NOTE — PROGRESS NOTES
Speech Language Pathology    Attempted to see pt for dysphagia therapy follow-up. Received pt resting in bed, evidently sleeping soundly. Pt opened eyes briefly when spoken to, but would not fully awaken/participate despite verbal/tactile cues/encouragement. Will hold and re-attempt later as able.     Linda Lee M.A., Eduar [id number]  Speech-Language Pathologist  Pager: [phone]

## 2021-09-30 NOTE — PROGRESS NOTES
This RN assumed care at this time.     Electronically signed by David Patel RN on 9/30/2021 at 3:11 PM

## 2021-09-30 NOTE — PROGRESS NOTES
Renal function is slightly better. Good urine output with Lasix.   Will hold dialysis for now thank you

## 2021-09-30 NOTE — PROGRESS NOTES
Progress Note  Admit Date: 9/15/2021              CC: F/U for COVID with respiratory failure    73 y.o. female with pmhx of CKD, current smoker, who presented with 3 days of SOB and increased work of breathing.     In the ED, patient tachypnic with accessory muscle use and hypoxic, SBP in 200s. Placed on Bipap per RT. Patient had lab work that was consistent with acute renal failure. Nephorology was consulted for CRRT. Patient was also severely acidotic and started on Bicarb gtt after one amp push. Broad spectrum abx were started in ED. Nitro gtt started.     Denies any sick contacts, not COVID vaccinated. Has not had any chest pain associated. No orthopnea, PND, swelling. Patient states that she has not had issues with breathing in past. Does not regularly follow with any physicians. Interval History:   Yesterday evening developed hemoptysis. She reports cough this morning but says she didn't look at it to see if having hemoptysis. No chest pain. No abdominal pain. No shortness of breath. Weaned to room air. Review of Systems - Negative except  As in HPI.      Scheduled Medications:    furosemide  40 mg IntraVENous TID    albuterol-ipratropium  1 puff Inhalation TID    insulin lispro  0-6 Units SubCUTAneous TID WC    insulin lispro  0-3 Units SubCUTAneous Nightly    nicotine  1 patch TransDERmal Daily    famotidine (PEPCID) injection  20 mg IntraVENous Daily    sodium chloride flush  5-40 mL IntraVENous 2 times per day    [Held by provider] heparin (porcine)  5,000 Units SubCUTAneous 3 times per day      PRN Medications: methyl salicylate-menthol, albuterol sulfate HFA, heparin (porcine), perflutren lipid microspheres, labetalol, OLANZapine, hydrALAZINE, glucose, dextrose, glucagon (rDNA), dextrose, sodium chloride flush, sodium chloride, ondansetron **OR** ondansetron, polyethylene glycol, acetaminophen **OR** acetaminophen  Diet: Adult Oral Nutrition Supplement; Standard High Calorie/High Protein failure. Acute hypoxic/hypercapnic respiratory failure 2/2 Covid-19 PNA  COVID Pneumonia  - Extubated 9/23  - S/p decadron 20 mg I/V for five days (9/17 tp 9/21)  - S/p tocilizumab (9/16  - Complete Decadron   - Weaned to room air    Sepsis  - Likely viral sepsis sec to COVID  - Possible superimposed bacterial pneumonia  - Respiratory cultures on  9/22 were negative. - Weaned off vasopressors, sepsis is resolving.  - Completed empiric txt course with Zosyn (9/16 until 9/26)    Hemoptysis likely due to frequent cough  Heparin held  Pulmonology consulted  She is not having further episodes  Supportive care at this time    HTN   - Continue antihypertensives  - Monitor Bps     Acute Anemia  Acute anemia possibly sec to sepsis, infection  Likely chronic component sec to CKD  Hgb stable    Acute renal failure  CKD  Volume overload  Pt is off CRRT  Diuresing with IV lasix  - May not need further HD to manage the pt's volume status if responds to diuretics: Defer to Nephro  - She may still need dialysis but labs today are encouraging.   - IV lasix to continue today, may be able to avoid HD    Hyperglycemia likely sec to steroids  - Possible underlying DM, A1C 6.6  - LDSSI    Full Code   Disposition: Transferred from ICU 9/25/2021  PT/OT eval ongoing, planning for placement, like in am if no further IV lasix is needed.      Chilo Davis,

## 2021-09-30 NOTE — PROGRESS NOTES
Pulmonology Progress Note  PGY-2    Admit Date: 9/15/2021  Hospital Day: 16  Antibiotics: None  Diet: Diet NPO  Code Status: Full Code       Interval history:  Pt was seen and examined this morning. Pt has remained afebrile overnight. She is on room air with SpO2 of 94%. Patient mentions that she had one episode of hemoptysis yesterday. Reports that she did not have any other episode of hemoptysis. She denies fevers, chills, chest pain, shortness of breath, nausea, vomiting, diarrhea, or constipation. Medications:   Scheduled Meds:   furosemide  40 mg IntraVENous TID    albuterol-ipratropium  1 puff Inhalation TID    insulin lispro  0-6 Units SubCUTAneous TID WC    insulin lispro  0-3 Units SubCUTAneous Nightly    nicotine  1 patch TransDERmal Daily    famotidine (PEPCID) injection  20 mg IntraVENous Daily    sodium chloride flush  5-40 mL IntraVENous 2 times per day    [Held by provider] heparin (porcine)  5,000 Units SubCUTAneous 3 times per day       Continuous Infusions:   dextrose 100 mL/hr (09/24/21 0528)    sodium chloride         PRN Meds:  methyl salicylate-menthol, albuterol sulfate HFA, heparin (porcine), perflutren lipid microspheres, labetalol, OLANZapine, hydrALAZINE, glucose, dextrose, glucagon (rDNA), dextrose, sodium chloride flush, sodium chloride, ondansetron **OR** ondansetron, polyethylene glycol, acetaminophen **OR** acetaminophen    Vital/I&O/Physical examination:   VS:  BP (!) 151/80   Pulse 90   Temp 98.4 °F (36.9 °C) (Oral)   Resp 18   Ht 5' 4\" (1.626 m)   Wt 120 lb 2.4 oz (54.5 kg)   SpO2 94%   BMI 20.62 kg/m²     I/O:    Intake/Output Summary (Last 24 hours) at 9/30/2021 1306  Last data filed at 9/30/2021 0519  Gross per 24 hour   Intake 300 ml   Output 1550 ml   Net -1250 ml       PE:  Physical Exam  Vitals reviewed. HENT:      Head: Normocephalic and atraumatic.       Nose: Nose normal.      Mouth/Throat:      Mouth: Mucous membranes are moist.   Eyes: Extraocular Movements: Extraocular movements intact. Conjunctiva/sclera: Conjunctivae normal.      Pupils: Pupils are equal, round, and reactive to light. Cardiovascular:      Rate and Rhythm: Normal rate and regular rhythm. Pulses: Normal pulses. Heart sounds: Normal heart sounds. Pulmonary:      Effort: Pulmonary effort is normal.      Comments: Expiratory rhonchi present on lung auscultation  Abdominal:      Palpations: Abdomen is soft. Musculoskeletal:         General: Normal range of motion. Cervical back: Normal range of motion and neck supple. Neurological:      General: No focal deficit present. Mental Status: She is alert and oriented to person, place, and time. Labs & Imaging:   LABS:  Renal:   Recent Labs     09/28/21 0449 09/29/21  0430 09/30/21  0503   * 145 144   K 4.2 4.0 4.5    108 109   CO2 18* 19* 19*   BUN 51* 52* 55*   CREATININE 5.1* 5.0* 4.8*   GLUCOSE 97 135* 137*   ANIONGAP 18* 18* 16     CBC:   Recent Labs     09/28/21 0449 09/29/21  0430 09/30/21  0503   WBC 9.4 11.3* 11.0   HGB 9.4* 8.4* 8.9*   HCT 30.2* 26.3* 27.6*    157 178   MCV 97.4 95.1 95.0                            Hepatic:   Recent Labs     09/28/21 0449 09/29/21  0430 09/30/21  0503   AST 13* 10* 9*   ALT 21 18 15   BILITOT <0.2 0.3 0.3   ALKPHOS 103 72 117     Troponin: No results for input(s): TROPONINI in the last 72 hours. BNP: No results for input(s): BNP in the last 72 hours. Lipids: No results for input(s): CHOL, HDL in the last 72 hours. Invalid input(s): LDLCALCU, TRIGLYCERIDE  INR: No results for input(s): INR in the last 72 hours. Lactate: No results for input(s): LACTATE in the last 72 hours.   ABGs:  Recent Labs     09/28/21  0541   PHART 7.325*   [id number] 41.1   PO2ART 68.0*   VXM1BWK 21   BEART -4.3*   [id number] 94   [id number] 23       UA:No results for input(s): NITRITE, COLORU, PHUR, LABCAST, WBCUA, RBCUA, MUCUS, TRICHOMONAS, YEAST, BACTERIA, CLARITYU, SPECGRAV, LEUKOCYTESUR, UROBILINOGEN, BILIRUBINUR, BLOODU, GLUCOSEU, AMORPHOUS in the last 72 hours. Invalid input(s): Radha Prudent     IMAGING:  XR CHEST PORTABLE   Final Result      Airspace disease in both upper lungs greater on the left side                  XR ABDOMEN (KUB) (SINGLE AP VIEW)   Final Result      Nonspecific bowel gas pattern      XR CHEST PORTABLE   Final Result      Coarse multifocal opacities most compatible with viral pneumonia. There is progression in the left lower lobe where there is obscuration of the left hemidiaphragm which is a new finding. Stable cardiac mediastinal silhouette. Lines and tubes without change. XR CHEST PORTABLE   Final Result   Worsening bilateral pulmonary infiltrates. Placement of endotracheal    tube as above. XR CHEST PORTABLE   Final Result   1. Right IJ line projects over the mid SVC. No pneumothorax. 2.  Unchanged multifocal airspace disease. XR CHEST PORTABLE   Final Result    Patchy bilateral airspace opacities greatest in the left lung base suggestive of multifocal pneumonia. Assessment & Plan:    Roberto Zapien is a 73 y.o. female with PMHx of CKD, current smoker, who presented with 3 days of SOB and increased work of breathing. Hemoptysis:  - 2/2 to most likely acute bronchitis. - Patient was tested positive for Covid pneumonia. She was intubated and got extubated on 09/23/2021.   - Patient had one episode of hemoptysis yesterday.   - Vitals remained stable and Hb stable      Acute hypoxic respiratory failure 2/2 Covid-19 PNA  - Patient was intubated and got extubated on 09/23/2021.    - s/p decadron for 10 days   - s/p tocilizumab   - Hypoxia resolved and patient is on room air with SpO2 in mid 90s.      HTN   - Continue hypertensive meds  - Monitor BP       CKD  - Nephrology folllowing     Hyperglycemia   - Possible underlying DM, HbA1c 6.6 on 09/16/2021  - LDSS        Code Status: Full Code  Diet NPO

## 2021-09-30 NOTE — CARE COORDINATION
[facility]: unable to accept    [facility]: unable to accept    [facility]: able to accept    [facility]: able to accept    [facility]: out of network    CM called pt's son Iwona Borrero back with options available. No pre-cert needed. 12:57 PM  CM spoke with pt's son Iwona Borrero, who has chosen [facility] for SNF.       Maldonado Zuniga, RN, BSN,   4th Floor Progressive Care Unit  [phone]

## 2021-09-30 NOTE — PROGRESS NOTES
Offered to call son Yonatan Chou for update pt declined  \"He's at work, [employer] call him\"

## 2021-09-30 NOTE — PLAN OF CARE
Problem: Gas Exchange - Impaired  Goal: Absence of hypoxia    Problem: Falls - Risk of:  Goal: Will remain free from falls  Description: Will remain free from falls  Outcome: Ongoing  Note: Standard and safety fall precautions in place. Call light within reach and needs anticipated. [employer] camera in room for safety.    Outcome: Ongoing     Problem: Isolation Precautions - Risk of Spread of Infection  Goal: Prevent transmission of infection  Outcome: Ongoing

## 2021-09-30 NOTE — PROGRESS NOTES
Comprehensive Nutrition Assessment    RD did not conduct direct, in-person nutrition evaluation in efforts to reduce exposure and use of PPE for high risk persons, PUI persons, patients who have tested positive for Covid-19 or those awaiting respiratory panel results. EMR was screened for nutrition risk factors, as defined per nutrition standards of care. RECOMMENDATIONS:  1. PO Diet: Continue diet per SLP- dysphagia minced and moist  2. ONS- Add Glucerna with meals to promote adequate nutrient intakes    NUTRITION ASSESSMENT:   Type and Reason for Visit:  Type and Reason for Visit: Reassess   Nutritional summary & status: Pt diet advanced to dysphagia minced and moist diet on 9/30. Variable PO intakes per meal documentation. Noted elevated blood sugars with A1c of 6.6% per EMR. RD will add glucerna ONS to help promote adequate nutrient intakes. Will continue to monitor nutritional status through admission. Patient admitted d/t SOB, COVID with respiratory failure    PMH significant for: CKD, current smoker    MALNUTRITION ASSESSMENT  Context of Malnutrition: Acute Illness   Malnutrition Status:  At risk for malnutrition (Comment)  Findings of the 6 clinical characteristics of malnutrition (Minimum of 2 out of 6 clinical characteristics is required to make the diagnosis of moderate or severe Protein Calorie Malnutrition based on AND/ASPEN Guidelines):  Energy Intake: Less than/equal to 50% of estimated energy requirements    Energy Intake Time: Greater than or equal to 5 days      NUTRITION DIAGNOSIS   · Inadequate oral intake related to impaired respiratory function as evidenced by intake 0-25%, swallow study results    [address] and/or Nutrient Delivery:  Continue Current Diet, Modify Oral Nutrition Supplement  Nutrition Education/Counseling:  No recommendation at this time   Goals:  pt will tolerate PO diet per SLP to consume greater than 75% of meals and supplements offered through admission       Nutrition Monitoring and Evaluation:   Food/Nutrient Intake Outcomes:  Diet Advancement/Tolerance, Food and Nutrient Intake  Physical Signs/Symptoms Outcomes:  Biochemical Data, Weight, Chewing or Swallowing   OBJECTIVE DATA: Significant to nutrition assessment  · Nutrition-Focused Physical Findings: Nutrition Related Findings: lbm 9/30 soft  · Labs: Reviewed; , POC Glu 133-209, HbA1c 6.6% (9/16/21)  · Meds: Reviewed; lasix, insulin, 0.9%NS  · Wounds: Wound Type: None     CURRENT NUTRITION THERAPIES  ADULT DIET; Dysphagia - Minced and Moist   PO Intake: Average Meal Intake: 1-25%, 51-75%   PO Supplement Intake:Average Supplements Intake: %  IVF: n/a     ANTHROPOMETRICS  Current Height: 5' 4\" (162.6 cm)  Current Weight: 120 lb 2.4 oz (54.5 kg)    Admission weight: 140 lb (63.5 kg)  Ideal Body Weight (lbs) (Calculated): 120 lbs (Ideal Body Weight (Kg) (Calculated): 55 kg  Usual Bodyweight SHELLEY - no wt hx   Weight Changes SHELLEY         BMI (Calculated): 20.7    Wt Readings from Last 50 Encounters:   09/30/21 120 lb 2.4 oz (54.5 kg)       COMPARATIVE STANDARDS  Energy (kcal):  3158-0842 kcal/d (25-30 kcal/kg); Weight Used for Energy Requirements:  Current     Protein (g):  79-92 g/d (1.3-1.5 g/kg); Weight Used for Protein Requirements:  Current      Fluid (ml/day):  1526-1499 mL/d; Method Used for Fluid Requirements:  1 ml/kcal      The patient will still be monitored per nutrition standards of care. Consult dietitian if nutrition interventions essential to patient care is needed.      Anu Rice, [address] [place] [address]  [place]:  [phone]  Office:  [phone]

## 2021-10-01 LAB
A/G RATIO: 0.7 (ref 1.1–2.2)
ALBUMIN SERPL-MCNC: 3.1 G/DL (ref 3.4–5)
ALP BLD-CCNC: 80 U/L (ref 40–129)
ALT SERPL-CCNC: 15 U/L (ref 10–40)
ANION GAP SERPL CALCULATED.3IONS-SCNC: 17 MMOL/L (ref 3–16)
AST SERPL-CCNC: 12 U/L (ref 15–37)
BASOPHILS ABSOLUTE: 0.1 K/UL (ref 0–0.2)
BASOPHILS RELATIVE PERCENT: 1.2 %
BILIRUB SERPL-MCNC: 0.3 MG/DL (ref 0–1)
BUN BLDV-MCNC: 58 MG/DL (ref 7–20)
CALCIUM SERPL-MCNC: 9.6 MG/DL (ref 8.3–10.6)
CHLORIDE BLD-SCNC: 106 MMOL/L (ref 99–110)
CO2: 20 MMOL/L (ref 21–32)
CREAT SERPL-MCNC: 4.8 MG/DL (ref 0.6–1.2)
EOSINOPHILS ABSOLUTE: 0.1 K/UL (ref 0–0.6)
EOSINOPHILS RELATIVE PERCENT: 0.9 %
GFR AFRICAN AMERICAN: 11
GFR NON-AFRICAN AMERICAN: 9
GLOBULIN: 4.5 G/DL
GLUCOSE BLD-MCNC: 106 MG/DL (ref 70–99)
GLUCOSE BLD-MCNC: 111 MG/DL (ref 70–99)
GLUCOSE BLD-MCNC: 140 MG/DL (ref 70–99)
GLUCOSE BLD-MCNC: 200 MG/DL (ref 70–99)
GLUCOSE BLD-MCNC: 204 MG/DL (ref 70–99)
HCT VFR BLD CALC: 26.6 % (ref 36–48)
HEMOGLOBIN: 8.5 G/DL (ref 12–16)
LYMPHOCYTES ABSOLUTE: 0.9 K/UL (ref 1–5.1)
LYMPHOCYTES RELATIVE PERCENT: 10.4 %
MAGNESIUM: 1.7 MG/DL (ref 1.8–2.4)
MCH RBC QN AUTO: 30.5 PG (ref 26–34)
MCHC RBC AUTO-ENTMCNC: 32.1 G/DL (ref 31–36)
MCV RBC AUTO: 95 FL (ref 80–100)
MONOCYTES ABSOLUTE: 0.6 K/UL (ref 0–1.3)
MONOCYTES RELATIVE PERCENT: 6.4 %
NEUTROPHILS ABSOLUTE: 7.4 K/UL (ref 1.7–7.7)
NEUTROPHILS RELATIVE PERCENT: 81.1 %
PDW BLD-RTO: 18.2 % (ref 12.4–15.4)
PERFORMED ON: ABNORMAL
PHOSPHORUS: 5 MG/DL (ref 2.5–4.9)
PLATELET # BLD: 200 K/UL (ref 135–450)
PMV BLD AUTO: 9.4 FL (ref 5–10.5)
POTASSIUM SERPL-SCNC: 4.4 MMOL/L (ref 3.5–5.1)
RBC # BLD: 2.8 M/UL (ref 4–5.2)
SODIUM BLD-SCNC: 143 MMOL/L (ref 136–145)
TOTAL PROTEIN: 7.6 G/DL (ref 6.4–8.2)
WBC # BLD: 9.1 K/UL (ref 4–11)

## 2021-10-01 PROCEDURE — 6360000002 HC RX W HCPCS: Performed by: STUDENT IN AN ORGANIZED HEALTH CARE EDUCATION/TRAINING PROGRAM

## 2021-10-01 PROCEDURE — 99233 SBSQ HOSP IP/OBS HIGH 50: CPT | Performed by: INTERNAL MEDICINE

## 2021-10-01 PROCEDURE — 2580000003 HC RX 258: Performed by: STUDENT IN AN ORGANIZED HEALTH CARE EDUCATION/TRAINING PROGRAM

## 2021-10-01 PROCEDURE — 6360000002 HC RX W HCPCS: Performed by: INTERNAL MEDICINE

## 2021-10-01 PROCEDURE — 85025 COMPLETE CBC W/AUTO DIFF WBC: CPT

## 2021-10-01 PROCEDURE — 2060000000 HC ICU INTERMEDIATE R&B

## 2021-10-01 PROCEDURE — 80053 COMPREHEN METABOLIC PANEL: CPT

## 2021-10-01 PROCEDURE — 2500000003 HC RX 250 WO HCPCS

## 2021-10-01 PROCEDURE — 94640 AIRWAY INHALATION TREATMENT: CPT

## 2021-10-01 PROCEDURE — 6370000000 HC RX 637 (ALT 250 FOR IP): Performed by: STUDENT IN AN ORGANIZED HEALTH CARE EDUCATION/TRAINING PROGRAM

## 2021-10-01 PROCEDURE — 84100 ASSAY OF PHOSPHORUS: CPT

## 2021-10-01 PROCEDURE — 83735 ASSAY OF MAGNESIUM: CPT

## 2021-10-01 PROCEDURE — 36415 COLL VENOUS BLD VENIPUNCTURE: CPT

## 2021-10-01 RX ORDER — FAMOTIDINE 20 MG/1
20 TABLET, FILM COATED ORAL DAILY
Status: DISCONTINUED | OUTPATIENT
Start: 2021-10-02 | End: 2021-10-05 | Stop reason: HOSPADM

## 2021-10-01 RX ADMIN — INSULIN LISPRO 1 UNITS: 100 INJECTION, SOLUTION INTRAVENOUS; SUBCUTANEOUS at 09:33

## 2021-10-01 RX ADMIN — HEPARIN SODIUM 5000 UNITS: 5000 INJECTION INTRAVENOUS; SUBCUTANEOUS at 15:07

## 2021-10-01 RX ADMIN — Medication 10 ML: at 09:33

## 2021-10-01 RX ADMIN — HEPARIN SODIUM 5000 UNITS: 5000 INJECTION INTRAVENOUS; SUBCUTANEOUS at 22:32

## 2021-10-01 RX ADMIN — FAMOTIDINE 20 MG: 10 INJECTION, SOLUTION INTRAVENOUS at 09:32

## 2021-10-01 RX ADMIN — FUROSEMIDE 40 MG: 10 INJECTION, SOLUTION INTRAMUSCULAR; INTRAVENOUS at 09:32

## 2021-10-01 RX ADMIN — Medication 10 ML: at 20:56

## 2021-10-01 RX ADMIN — INSULIN LISPRO 2 UNITS: 100 INJECTION, SOLUTION INTRAVENOUS; SUBCUTANEOUS at 12:07

## 2021-10-01 RX ADMIN — HEPARIN SODIUM 5000 UNITS: 5000 INJECTION INTRAVENOUS; SUBCUTANEOUS at 05:21

## 2021-10-01 RX ADMIN — INSULIN LISPRO 1 UNITS: 100 INJECTION, SOLUTION INTRAVENOUS; SUBCUTANEOUS at 22:31

## 2021-10-01 RX ADMIN — FUROSEMIDE 40 MG: 10 INJECTION, SOLUTION INTRAMUSCULAR; INTRAVENOUS at 15:07

## 2021-10-01 RX ADMIN — FUROSEMIDE 40 MG: 10 INJECTION, SOLUTION INTRAMUSCULAR; INTRAVENOUS at 20:56

## 2021-10-01 ASSESSMENT — PAIN SCALES - GENERAL
PAINLEVEL_OUTOF10: 0

## 2021-10-01 NOTE — PLAN OF CARE
Problem: Nutrition  Goal: Optimal nutrition therapy  Outcome: Ongoing  Note: Nutrition Problem #1: Inadequate oral intake  Intervention: Food and/or Nutrient Delivery: Continue Current Diet, Modify Oral Nutrition Supplement  Nutritional Goals: pt will tolerate PO diet per SLP to consume greater than 75% of meals and supplements offered through admission

## 2021-10-01 NOTE — PROGRESS NOTES
Brandy Sax : [phone]     Fax :[phone]        Renal  Note    Patient:  Wild Michelle  MRN: [id number]    CHIEF COMPLAINT:  SOB, chills    History Obtained From:  electronic medical record      10/01/21    BP in good range   UO much better   On lasix           Past Medical History:     OA (osteoarthritis)    Hypertension    Personal history of tobacco use, presenting hazards to health    Lower limb length difference    Current smoker    Renal and perinephric abscess    Acute renal failure (HCC)    Anemia, unspecified    Routine adult health maintenance    Peptic ulcer, unspecified site, unspecified as acute or chronic, without mention of hemorrhage, perforation, or obstruction    Right hip pain    DDD (degenerative disc disease), lumbosacral    External thrombosed hemorrhoids    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 Medications Prior to Admission:    Prior to Admission medications    Medication Sig Start Date End Date Taking?  Authorizing Provider   albuterol sulfate  (90 Base) MCG/ACT inhaler  9/7/21   Historical Provider, MD   atenolol (TENORMIN) 25 MG tablet Take 25 mg by mouth daily    Historical Provider, MD   ibuprofen (ADVIL;MOTRIN) 600 MG tablet TAKE 1 TABLET BY MOUTH THREE TIMES A DAY WITH FOOD 8/2/21   Historical Provider, MD       Allergies:  Patient has no known allergies. Social History:   TOBACCO:   reports that she has been smoking cigarettes. She has been smoking about 0.50 packs per day. She has never used smokeless tobacco.  ETOH:   reports previous alcohol use. OCCUPATION:      Family History:   History reviewed. No pertinent family history. Physical Exam:    Vitals: BP (!) 164/81   Pulse 97   Temp 99.3 °F (37.4 °C) (Oral)   Resp 18   Ht 5' 4\" (1.626 m)   Wt 116 lb 6.5 oz (52.8 kg)   SpO2 91%   BMI 19.98 kg/m²   Constitutional:  On 4L NC  HEENT:  ETT in place  Neck: unable to assess JVD  Cardiovascular:  S1, S2 reg  Respiratory: symmetric lung expansion  Abdomen:  +BS, soft, ND  Ext: + lower extremity edema  Skin: dry/intact  CNS: sedated     CBC:   Recent Labs     09/29/21 0430 09/30/21  0503 10/01/21  0526   WBC 11.3* 11.0 9.1   HGB 8.4* 8.9* 8.5*    178 200     BMP:    Recent Labs     09/29/21 0430 09/30/21  0503 10/01/21  0526    144 143   K 4.0 4.5 4.4    109 106   CO2 19* 19* 20*   BUN 52* 55* 58*   CREATININE 5.0* 4.8* 4.8*   GLUCOSE 135* 137* 111*     Hepatic:   Recent Labs     09/29/21 0430 09/30/21  0503 10/01/21  0526   AST 10* 9* 12*   ALT 18 15 15   BILITOT 0.3 0.3 0.3   ALKPHOS 72 117 80     Troponin:   No results for input(s): TROPONINI in the last 72 hours. BNP: No results for input(s): BNP in the last 72 hours. Lipids: No results for input(s): CHOL, HDL in the last 72 hours. Invalid input(s): LDLCALCU  ABGs:   Lab Results   Component Value Date    PHART 7.325 09/28/2021    PO2ART 68.0 09/28/2021    AEP6ELG 41.1 09/28/2021     INR: No results for input(s): INR in the last 72 hours. -----------------------------------------------------------------      Assessment and Plan   1. Acute Renal Failure   W/ ho CKD. Pt non compliant    2.  Acid/Base electrolyte abnormalities    3. Anemia    4. Covid-19 infection    Patient Active Problem List   Diagnosis Code    Acute renal failure ([facility] N17.9    2019 novel coronavirus-infected pneumonia ([facility]) U07.1, J12.82    Acute respiratory failure with hypoxia ([facility]) J96.01    Septic shock ([facility]) A41.9, R65.21    Acute cystitis without hematuria N30.00    Pneumonia of left lower lobe due to infectious organism J18.9       Plan   - HD as needed   - lasix IV   - UO good   - replace K   - UF as rodger   - replace lytes   - Monitor UO   - COVID -19 treatment per primary team        Maintain SBP> 90 mmHg   Daily weights   AVOID NSAIDs  Avoid Nephrotoxins  Monitor Intake/Output  Call if significant decrease in urine output         Thank you for allowing us to participate in care of Didi Little MD   [facility]  Office : [phone]  Fax :[phone]

## 2021-10-01 NOTE — CARE COORDINATION
Case Management Assessment           Daily Note                 Date/ Time of Note: 10/1/2021 2:53 PM         Note completed by: David Potts RN    Patient Name: Gaurav Kee  YOB: 1950    Diagnosis:Metabolic acidosis [E73.9]  Acute renal failure (ARF) ([facility] [N17.9]  Acute respiratory failure with hypoxia ([facility] [J96.01]  Acute renal failure, unspecified acute renal failure type ([address] [N17.9]  Pneumonia of left lower lobe due to infectious organism [J18.9]  Suspected COVID-19 virus infection [Z20.822]  Patient Admission Status: Inpatient    Date of [facility]  8:00 PM Length of Stay: 16 GLOS: GMLOS: 12.4    Current Plan of Care: IV lasix, monitoring kidney function  ________________________________________________________________________________________  PT AM-PAC: 8 / 24 per last evaluation on: 9/30    OT AM-PAC: 14 / 24 per last evaluation on: 9/30    DME Needs for discharge: defer  ________________________________________________________________________________________  Discharge Plan: [facility]: [facility]    Tentative discharge date: 10/2    Current barriers to discharge: nephrology following      ________________________________________________________________________________________  Case Management Notes:  Patient will be discharging to [facility], will need H[facility] and transport. Cheo Sims and her family were provided with choice of provider; she and her family are in agreement with the discharge plan.     Care Transition Patient: Mariela Potts RN  [facility]  Case Management Department  Ph: [phone]  Fax: [phone]

## 2021-10-01 NOTE — PROGRESS NOTES
Sukhi Andersonberry : [phone]     Fax :[phone]        Renal  Note    Patient:  Kiki Slaughter  MRN: [id number]    CHIEF COMPLAINT:  SOB, chills    History Obtained From:  electronic medical record      09/30/21    BP in good range   UO much better   On lasix           Past Medical History:     OA (osteoarthritis)    Hypertension    Personal history of tobacco use, presenting hazards to health    Lower limb length difference    Current smoker    Renal and perinephric abscess    Acute renal failure (HCC)    Anemia, unspecified    Routine adult health maintenance    Peptic ulcer, unspecified site, unspecified as acute or chronic, without mention of hemorrhage, perforation, or obstruction    Right hip pain    DDD (degenerative disc disease), lumbosacral    External thrombosed hemorrhoids    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 Medications Prior to Admission:    Prior to Admission medications    Medication Sig Start Date End Date Taking?  Authorizing Provider   albuterol sulfate  (90 Base) MCG/ACT inhaler  9/7/21   Historical Provider, MD   atenolol (TENORMIN) 25 MG tablet Take 25 mg by mouth daily    Historical Provider, MD   ibuprofen (ADVIL;MOTRIN) 600 MG tablet TAKE 1 TABLET BY MOUTH THREE TIMES A DAY WITH FOOD 8/2/21   Historical Provider, MD       Allergies:  Patient has no known allergies. Social History:   TOBACCO:   reports that she has been smoking cigarettes. She has been smoking about 0.50 packs per day. She has never used smokeless tobacco.  ETOH:   reports previous alcohol use. OCCUPATION:      Family History:   History reviewed. No pertinent family history. Physical Exam:    Vitals: /78   Pulse 93   Temp 98.2 °F (36.8 °C) (Oral)   Resp 17   Ht 5' 4\" (1.626 m)   Wt 120 lb 2.4 oz (54.5 kg)   SpO2 94%   BMI 20.62 kg/m²   Constitutional:  On 4L NC  HEENT:  ETT in place  Neck: unable to assess JVD  Cardiovascular:  S1, S2 reg  Respiratory: symmetric lung expansion  Abdomen:  +BS, soft, ND  Ext: + lower extremity edema  Skin: dry/intact  CNS: sedated     CBC:   Recent Labs     09/28/21 0449 09/29/21  0430 09/30/21  0503   WBC 9.4 11.3* 11.0   HGB 9.4* 8.4* 8.9*    157 178     BMP:    Recent Labs     09/28/21 0449 09/29/21  0430 09/30/21  0503   * 145 144   K 4.2 4.0 4.5    108 109   CO2 18* 19* 19*   BUN 51* 52* 55*   CREATININE 5.1* 5.0* 4.8*   GLUCOSE 97 135* 137*     Hepatic:   Recent Labs     09/28/21 0449 09/29/21  0430 09/30/21  0503   AST 13* 10* 9*   ALT 21 18 15   BILITOT <0.2 0.3 0.3   ALKPHOS 103 72 117     Troponin:   No results for input(s): TROPONINI in the last 72 hours. BNP: No results for input(s): BNP in the last 72 hours. Lipids: No results for input(s): CHOL, HDL in the last 72 hours. Invalid input(s): LDLCALCU  ABGs:   Lab Results   Component Value Date    PHART 7.325 09/28/2021    PO2ART 68.0 09/28/2021    LIF3QAX 41.1 09/28/2021     INR: No results for input(s): INR in the last 72 hours. -----------------------------------------------------------------      Assessment and Plan   1. Acute Renal Failure   W/ ho CKD. Pt non compliant    2.  Acid/Base electrolyte abnormalities    3. Anemia    4. Covid-19 infection    Patient Active Problem List   Diagnosis Code    Acute renal failure ([facility] [address] N17.9    2019 novel coronavirus-infected pneumonia (NCIP) U07.1, J12.82    Acute respiratory failure with hypoxia ([facility]) J96.01    Septic shock ([facility]) A41.9, R65.21    Acute cystitis without hematuria N30.00    Pneumonia of left lower lobe due to infectious organism J18.9       Plan   - HD as needed   - lasix IV   - UO good   - replace K   - UF as rodger   - replace lytes   - Monitor UO   - COVID -19 treatment per primary team        Maintain SBP> 90 mmHg   Daily weights   AVOID NSAIDs  Avoid Nephrotoxins  Monitor Intake/Output  Call if significant decrease in urine output         Thank you for allowing us to participate in care of Izabel Mcnamara MD   [facility] [address]  Office : [phone]  Fax :[phone]

## 2021-10-01 NOTE — PROGRESS NOTES
Pulmonology Progress Note  PGY-2    Admit Date: 9/15/2021  Hospital Day: 17  Antibiotics: None  Diet: ADULT DIET; Dysphagia - Minced and Moist  Code Status: Full Code       Interval history:  Pt was seen and examined this morning. Pt has remained afebrile overnight. She is on room air with SpO2 of 94%. Patient mentions that she did not have any more episode of hemoptysis. She endorses occasional non productive cough. Denies fevers, chills, chest pain, shortness of breath, nausea, vomiting, diarrhea, or constipation. Medications:   Scheduled Meds:   furosemide  40 mg IntraVENous TID    albuterol-ipratropium  1 puff Inhalation TID    insulin lispro  0-6 Units SubCUTAneous TID WC    insulin lispro  0-3 Units SubCUTAneous Nightly    nicotine  1 patch TransDERmal Daily    famotidine (PEPCID) injection  20 mg IntraVENous Daily    sodium chloride flush  5-40 mL IntraVENous 2 times per day    heparin (porcine)  5,000 Units SubCUTAneous 3 times per day       Continuous Infusions:   dextrose 100 mL/hr (09/24/21 0528)    sodium chloride         PRN Meds:  methyl salicylate-menthol, albuterol sulfate HFA, heparin (porcine), perflutren lipid microspheres, labetalol, OLANZapine, hydrALAZINE, glucose, dextrose, glucagon (rDNA), dextrose, sodium chloride flush, sodium chloride, ondansetron **OR** ondansetron, polyethylene glycol, acetaminophen **OR** acetaminophen    Vital/I&O/Physical examination:   VS:  /70   Pulse 99   Temp 98.4 °F (36.9 °C) (Oral)   Resp 18   Ht 5' 4\" (1.626 m)   Wt 116 lb 6.5 oz (52.8 kg)   SpO2 91%   BMI 19.98 kg/m²     I/O:    Intake/Output Summary (Last 24 hours) at 10/1/2021 0714  Last data filed at 10/1/2021 0519  Gross per 24 hour   Intake 0 ml   Output 900 ml   Net -900 ml       PE:  Physical Exam  Vitals reviewed. HENT:      Head: Normocephalic and atraumatic.       Nose: Nose normal.      Mouth/Throat:      Mouth: Mucous membranes are moist.   Eyes:      Extraocular Movements: Extraocular movements intact. Conjunctiva/sclera: Conjunctivae normal.      Pupils: Pupils are equal, round, and reactive to light. Cardiovascular:      Rate and Rhythm: Normal rate and regular rhythm. Pulses: Normal pulses. Heart sounds: Normal heart sounds. Pulmonary:      Effort: Pulmonary effort is normal.      Comments: Expiratory rhonchi present on lung auscultation  Abdominal:      Palpations: Abdomen is soft. Musculoskeletal:         General: Normal range of motion. Cervical back: Normal range of motion and neck supple. Neurological:      General: No focal deficit present. Mental Status: She is alert and oriented to person, place, and time. Labs & Imaging:   LABS:  Renal:   Recent Labs     09/29/21 0430 09/30/21  0503 10/01/21  0526    144 143   K 4.0 4.5 4.4    109 106   CO2 19* 19* 20*   BUN 52* 55* 58*   CREATININE 5.0* 4.8* 4.8*   GLUCOSE 135* 137* 111*   ANIONGAP 18* 16 17*     CBC:   Recent Labs     09/29/21 0430 09/30/21  0503 10/01/21  0526   WBC 11.3* 11.0 9.1   HGB 8.4* 8.9* 8.5*   HCT 26.3* 27.6* 26.6*    178 200   MCV 95.1 95.0 95.0                            Hepatic:   Recent Labs     09/29/21 0430 09/30/21  0503 10/01/21  0526   AST 10* 9* 12*   ALT 18 15 15   BILITOT 0.3 0.3 0.3   ALKPHOS 72 117 80     Troponin: No results for input(s): TROPONINI in the last 72 hours. BNP: No results for input(s): BNP in the last 72 hours. Lipids: No results for input(s): CHOL, HDL in the last 72 hours. Invalid input(s): LDLCALCU, TRIGLYCERIDE  INR: No results for input(s): INR in the last 72 hours. Lactate: No results for input(s): LACTATE in the last 72 hours. ABGs:  No results for input(s): PHART, WWR3MJN, PO2ART, SMT2LIT, BEART, THGBART, V2LIRZWX, YXW9GOD in the last 72 hours.     UA:No results for input(s): NITRITE, COLORU, PHUR, LABCAST, WBCUA, RBCUA, MUCUS, TRICHOMONAS, YEAST, BACTERIA, CLARITYU, SPECGRAV, LEUKOCYTESUR, UROBILINOGEN, BILIRUBINUR, BLOODU, GLUCOSEU, AMORPHOUS in the last 72 hours. Invalid input(s): Rosi Lindatyler     IMAGING:  XR CHEST PORTABLE   Final Result      Airspace disease in both upper lungs greater on the left side                  XR ABDOMEN (KUB) (SINGLE AP VIEW)   Final Result      Nonspecific bowel gas pattern      XR CHEST PORTABLE   Final Result      Coarse multifocal opacities most compatible with viral pneumonia. There is progression in the left lower lobe where there is obscuration of the left hemidiaphragm which is a new finding. Stable cardiac mediastinal silhouette. Lines and tubes without change. XR CHEST PORTABLE   Final Result   Worsening bilateral pulmonary infiltrates. Placement of endotracheal    tube as above. XR CHEST PORTABLE   Final Result   1. Right IJ line projects over the mid SVC. No pneumothorax. 2.  Unchanged multifocal airspace disease. XR CHEST PORTABLE   Final Result    Patchy bilateral airspace opacities greatest in the left lung base suggestive of multifocal pneumonia. Assessment & Plan:    Gaurav Kee is a 73 y.o. female with PMHx of CKD, current smoker, who presented with 3 days of SOB and increased work of breathing. Cough and Hemoptysis:  - 2/2 to most likely acute bronchitis. - Patient was tested positive for Covid pneumonia. She was intubated and got extubated on 09/23/2021.   - Patient had one episode of hemoptysis. She endorses occasional non productive cough. - Vitals remained stable and Hb stable  - We will continue to monitor.        Acute hypoxic respiratory failure 2/2 Covid-19 PNA  - Patient was intubated and got extubated on 09/23/2021.    - s/p decadron for 10 days   - s/p tocilizumab   - Hypoxia resolved and patient is on room air with SpO2 in mid 90s.      HTN   - Continue hypertensive meds  - Monitor BP       CKD  - Nephrology folllowing     Hyperglycemia   - Possible underlying DM, HbA1c 6.6 on 09/16/2021  - LDSS        Code Status: Full Code  ADULT DIET; Dysphagia - Minced and Moist    PPX: Pepcid, Heparin       This patient will be discussed with attending, Dr. Ean Ventura MD.    Anand Crenshaw MD, PGY- 2  Contact via [address] [place] [address]  10/1/2021,  7:47 AM  Patient examined, findings as discussed with Dr. Cynthia Ricks. Agree with assessment and plan. No further episodes of hemoptysis. Respiratory status overall is improving, has no oxygen requirement. She continues on bronchodilator therapy. Renal function is improving, and she is making urine, but still has significant fluid volume overload. This is expected to clear gradually. Anticipate she will be discharged to rehab facility. I will be happy to follow-up with her as an outpatient for chronic lung disease. We discussed smoking cessation, and she feels committed to doing this. We also discussed the unexpected death of her 2 brothers, with whom she was living.

## 2021-10-01 NOTE — PLAN OF CARE
Problem: Falls - Risk of:  Goal: Will remain free from falls  Description: Will remain free from falls  Outcome: Ongoing  Note: Standard and safety precautions in place. Call light within reach and needs anticipeted. Will continue to monitor. Problem: Gas Exchange - Impaired  Goal: Absence of hypoxia  Outcome: Ongoing  Note: Satting well above 92% on room air, still with occasional productive cough no hemoptysis noted at this time. Will continue to monitor.

## 2021-10-01 NOTE — PROGRESS NOTES
Progress Note  Admit Date: 9/15/2021              CC: F/U for COVID with respiratory failure    73 y.o. female with pmhx of CKD, current smoker, who presented with 3 days of SOB and increased work of breathing.     In the ED, patient tachypnic with accessory muscle use and hypoxic, SBP in 200s. Placed on Bipap per RT. Patient had lab work that was consistent with acute renal failure. Nephorology was consulted for CRRT. Patient was also severely acidotic and started on Bicarb gtt after one amp push. Broad spectrum abx were started in ED. Nitro gtt started.     Denies any sick contacts, not COVID vaccinated. Has not had any chest pain associated. No orthopnea, PND, swelling. Patient states that she has not had issues with breathing in past. Does not regularly follow with any physicians. Interval History:   Not having much cough today. Feels ok. Eating well. Not short of breath. Review of Systems - Negative except  As in HPI. Scheduled Medications:    [START ON 10/2/2021] famotidine  20 mg Oral Daily    furosemide  40 mg IntraVENous TID    albuterol-ipratropium  1 puff Inhalation TID    insulin lispro  0-6 Units SubCUTAneous TID WC    insulin lispro  0-3 Units SubCUTAneous Nightly    nicotine  1 patch TransDERmal Daily    sodium chloride flush  5-40 mL IntraVENous 2 times per day    heparin (porcine)  5,000 Units SubCUTAneous 3 times per day      PRN Medications: methyl salicylate-menthol, albuterol sulfate HFA, heparin (porcine), perflutren lipid microspheres, labetalol, OLANZapine, hydrALAZINE, glucose, dextrose, glucagon (rDNA), dextrose, sodium chloride flush, sodium chloride, ondansetron **OR** ondansetron, polyethylene glycol, acetaminophen **OR** acetaminophen  Diet: ADULT DIET;  Dysphagia - Minced and Moist  Adult Oral Nutrition Supplement; Diabetic Oral Supplement    Continuous Infusions:   dextrose 100 mL/hr (09/24/21 0528)    sodium chloride         PHYSICAL EXAM:  BP (!) 164/81 Pulse 97   Temp 99.3 °F (37.4 °C) (Oral)   Resp 18   Ht 5' 4\" (1.626 m)   Wt 116 lb 6.5 oz (52.8 kg)   SpO2 91%   BMI 19.98 kg/m²   Recent Labs     09/29/21 2131 09/30/21 1956 10/01/21  0754 10/01/21  1206 10/01/21  1701   POCGLU 209* 217* 140* 204* 106*       Intake/Output Summary (Last 24 hours) at 10/1/2021 1758  Last data filed at 10/1/2021 1313  Gross per 24 hour   Intake 240 ml   Output 1250 ml   Net -1010 ml       General appearance: alert, appears stated age and cooperative  Head: Normocephalic, without obvious abnormality, atraumatic  Lungs: Some mild wheezing. No rales  Heart: regular rate and rhythm, S1, S2 normal, no murmur, click, rub or gallop  Abdomen: Mild tenderness, LLQ. No rebound. BS present  Extremities: extremities normal, atraumatic, no cyanosis or edema  Pulses: 2+ and symmetric  Neurologic: Grossly normal    LABS:  Recent Labs     09/29/21  0430 09/30/21  0503 10/01/21  0526   WBC 11.3* 11.0 9.1   HGB 8.4* 8.9* 8.5*   HCT 26.3* 27.6* 26.6*    178 200                                                                    Recent Labs     09/29/21  0430 09/30/21  0503 10/01/21  0526    144 143   K 4.0 4.5 4.4    109 106   CO2 19* 19* 20*   BUN 52* 55* 58*   CREATININE 5.0* 4.8* 4.8*   GLUCOSE 135* 137* 111*     Recent Labs     09/29/21  0430 09/30/21  0503 10/01/21  0526   AST 10* 9* 12*   ALT 18 15 15   BILITOT 0.3 0.3 0.3   ALKPHOS 72 117 80     No results for input(s): TROPONINI in the last 72 hours. No results for input(s): BNP in the last 72 hours. No results for input(s): CHOL, HDL in the last 72 hours. Invalid input(s): LDLCALCU  No results for input(s): INR in the last 72 hours. Assessment & Plan:    73 y.o. female with CKD, current smoker, admitted 9/15/2021 with COVID, resp failure.      Acute hypoxic/hypercapnic respiratory failure 2/2 Covid-19 PNA  COVID Pneumonia  Volume overload  - Extubated 9/23  - S/p decadron 20 mg I/V for five days (9/17 tp 9/21)  - S/p tocilizumab (9/16  - Complete Decadron   - Weaned to room air  - Will follow-up with Dr. Rene Gregorio as outpatient    Sepsis  - Likely viral sepsis sec to COVID  - Possible superimposed bacterial pneumonia  - Respiratory cultures on  9/22 were negative. - Weaned off vasopressors, sepsis is resolving.  - Completed empiric txt course with Zosyn (9/16 until 9/26)    Hemoptysis likely due to frequent cough  Pulmonology consulted, appreciate recs  She is not having further episodes  Supportive care at this time    HTN   - Continue antihypertensives  - Monitor Bps     Acute Anemia  Acute anemia possibly sec to sepsis, infection  Likely chronic component sec to CKD  Hgb stable    Acute renal failure  CKD  Volume overload  Pt is off CRRT  Diuresing with IV lasix  - May not need further HD to manage the pt's volume status if responds to diuretics: Defer to Nephro  - She may still need dialysis but hoping kidney function will continue to recover    Hyperglycemia likely sec to steroids  - Possible underlying DM, A1C 6.6  - LDSSI    Full Code   Disposition: Transferred from ICU 9/25/2021  PT/OT eval ongoing, planning for placement when stable from nephrology standpoint.    DVT ppx: Heparin subq     Luann Fritz DO

## 2021-10-01 NOTE — DISCHARGE INSTR - COC
Continuity of Care Form    Patient Name: Terry Mcmillan   :  1950  MRN:  [id number]    Admit date:  9/15/2021  Discharge date:  10/5/21    Code Status Order: Full Code   Advance Directives:      Admitting Physician:  Dede Lewis MD  PCP: Referring Not In System (Inactive)    Discharging Nurse: [facility] Unit/Room#: 1881/8635-69  Discharging Unit Phone Number: [phone]    Emergency Contact:   Extended Emergency Contact Information  Primary Emergency Contact: [employer] Phone: [phone]  Mobile Phone: [phone]  Relation: Child    Past Surgical History:  History reviewed. No pertinent surgical history. Immunization History: There is no immunization history on file for this patient.     Active Problems:  Patient Active Problem List   Diagnosis Code    Acute renal failure ([facility] [address] N17.9    2019 novel coronavirus-infected pneumonia (NCIP) U07.1, J12.82    Acute respiratory failure with hypoxia (HCC) J96.01    Septic shock ([facility] [address] A41.9, R65.21    Acute cystitis without hematuria N30.00    Pneumonia of left lower lobe due to infectious organism J18.9       Isolation/Infection:   Isolation            Droplet Plus          Patient Infection Status       Infection Onset Added Last Indicated Last Indicated By Review Planned Expiration Resolved Resolved By    COVID-19 09/15/21 09/16/21 09/15/21 COVID-19 09/23/21 10/13/21      Resolved    COVID-19 Rule Out 09/15/21 09/15/21 09/15/21 COVID-19, Rapid (Ordered)   21 Rule-Out Test Resulted            Nurse Assessment:  Last Vital Signs: BP (!) 147/72   Pulse 88   Temp 98.4 °F (36.9 °C) (Oral)   Resp 16   Ht 5' 4\" (1.626 m)   Wt 116 lb 6.5 oz (52.8 kg)   SpO2 93%   BMI 19.98 kg/m²     Last documented pain score (0-10 scale): Pain Level: 0  Last Weight:   Wt Readings from Last 1 Encounters:   10/01/21 116 lb 6.5 oz (52.8 kg)     Mental Status:  alert    IV Access:  - None    Nursing Mobility/ADLs:  Walking Assisted  Transfer  Assisted  Bathing  Assisted  Dressing  Assisted  Toileting  Assisted  Feeding  Independent  Med Admin  Assisted  Med Delivery   whole    Wound Care Documentation and Therapy:        Elimination:  Continence:   · Bowel: No  · Bladder: No  Urinary Catheter: None   Colostomy/Ileostomy/Ileal Conduit: No  [REMOVED] Rectal Tube-Stool Appearance: Loose  [REMOVED] Rectal Tube-Stool Color: Brown  [REMOVED] Rectal Tube-Stool Amount: Medium    Date of Last BM: 10/5/21    Intake/Output Summary (Last 24 hours) at 10/1/2021 1523  Last data filed at 10/1/2021 1206  Gross per 24 hour   Intake 0 ml   Output 1250 ml   Net -1250 ml     I/O last 3 completed shifts: In: 0   Out: 1250 [Urine:1250]    Safety Concerns: At Risk for Falls    Impairments/Disabilities:      None    Nutrition Therapy:  Current Nutrition Therapy:   - Oral Diet:  General    Routes of Feeding: Oral  Liquids: No Restrictions  Daily Fluid Restriction: no  Last Modified Barium Swallow with Video (Video Swallowing Test): not done    Treatments at the Time of Hospital Discharge:   Respiratory Treatments: Inhaler   Oxygen Therapy:  is not on home oxygen therapy.   Ventilator:    - No ventilator support    Rehab Therapies: Physical Therapy, Occupational Therapy and Speech/Language Therapy  Weight Bearing Status/Restrictions: No weight bearing restirctions  Other Medical Equipment (for information only, NOT a DME order):  walker  Other Treatments: NA    Patient's personal belongings (please select all that are sent with patient):  None    RN SIGNATURE:  Electronically signed by Chilo Lucas RN on 10/5/21 at 3:46 PM EDT    CASE MANAGEMENT/SOCIAL WORK SECTION    Inpatient Status Date: ***    Readmission Risk Assessment Score:  Readmission Risk              Risk of Unplanned Readmission:  19           Discharging to Facility/ Agency   Name:   [facility] [address] [address]       Phone: [phone] Fax: [phone]        ·   ·     / signature: Electronically signed by Demetri Momin RN on 10/1/21 at 3:23 PM EDT    PHYSICIAN SECTION    Prognosis: Fair    Condition at Discharge: Stable    Rehab Potential (if transferring to Rehab): Fair    Recommended Labs or Other Treatments After Discharge:   Low potassium diet  BMP in 3-5 days  Follow up with Nephrology/MAGNUS Potter      Physician Certification: I certify the above information and transfer of Cheli Catalan  is necessary for the continuing treatment of the diagnosis listed and that she requires [facility] for greater 30 days.      Update Admission H&P: No change in H&P    PHYSICIAN SIGNATURE:  Electronically signed by Leticia Parker MD on 10/5/21 at 1:45 PM EDT

## 2021-10-01 NOTE — PLAN OF CARE
Problem: Falls - Risk of:  Goal: Will remain free from falls  Description: Will remain free from falls  Outcome: Met This Shift     Problem: Airway Clearance - Ineffective  Goal: Achieve or maintain patent airway  Outcome: Met This Shift     Problem: Gas Exchange - Impaired  Goal: Absence of hypoxia  Outcome: Ongoing     Problem: Nutrition  Goal: Optimal nutrition therapy  10/1/2021 1053 by Rufino Banks, MS, RD, LD  Outcome: Ongoing  Note: Nutrition Problem #1: Inadequate oral intake  Intervention: Food and/or Nutrient Delivery: Continue Current Diet, Modify Oral Nutrition Supplement  Nutritional Goals: pt will tolerate PO diet per SLP to consume greater than 75% of meals and supplements offered through admission

## 2021-10-01 NOTE — PROGRESS NOTES
Notification of IV to PO Conversion     IV To Po Conversion:   · Will convert Famotidine 20 mg IV daily to 20 mg PO daily based on [facility] IV to PO policy (see below). Please call with questions.   Ghulam Kendall PharmD, BCPS  Wireless: [phone]   10/1/2021 12:44 PM      Criteria for conversion from IV to PO therapy per [facility] IV to PO Protocol:   Patients should meet all of the following inclusion criteria and none of the exclusion criteria    Criteria to initiate medication route switch (Inclusion Criteria)   IV therapy for > 24 hours (antibiotics only)   Tolerating diet more advanced than clear liquids   Tolerating oral (PO) medications   Does not require vasopressor therapy for blood pressure support   No seizures for 72hrs (antiepileptic medications only)      Criteria indicating that the patient is NOT a candidate for IV to PO conversion (Exclusion Criteria)   Infections requiring IV therapy (ie: meningitis, endocarditis, osteomyelitis, pancreatitis)   Nausea and/or vomiting or severe diarrhea within past 24 hours   Has gastrectomy, ileus, gastric outlet or bowel obstruction, or malabsorption syndromes   Has significant, painful oral ulceration   TPN with an NPO order   Active GI bleed   Unable to swallow   Nothing by Mouth (NPO) status   Febrile in the last 24 hours- (antibiotics only)   Clinical deteriorating or unstable - (antibiotics only)   Pediatric patients and patients who are not euthyroid (not on oral levothyroxine/not stabilized on oral levothyroxine) - (Levothyroxine only)   Patients being treated for myxedema coma or during the organ donation process - (Levothyroxine only)    Other notes-    Patients may be given suppositories when available for the product being ordered if no contraindications exist (ie: rectal surgery or infection)    Oral solutions/suspensions may be considered for patients with a functioning enteral tube not on continuous suction (if medication is available in this formulation)

## 2021-10-02 LAB
A/G RATIO: 0.7 (ref 1.1–2.2)
ALBUMIN SERPL-MCNC: 3 G/DL (ref 3.4–5)
ALP BLD-CCNC: 90 U/L (ref 40–129)
ALT SERPL-CCNC: 19 U/L (ref 10–40)
ANION GAP SERPL CALCULATED.3IONS-SCNC: 17 MMOL/L (ref 3–16)
AST SERPL-CCNC: 11 U/L (ref 15–37)
BASOPHILS ABSOLUTE: 0 K/UL (ref 0–0.2)
BASOPHILS RELATIVE PERCENT: 0.7 %
BILIRUB SERPL-MCNC: 0.3 MG/DL (ref 0–1)
BUN BLDV-MCNC: 56 MG/DL (ref 7–20)
CALCIUM SERPL-MCNC: 9.2 MG/DL (ref 8.3–10.6)
CHLORIDE BLD-SCNC: 108 MMOL/L (ref 99–110)
CO2: 17 MMOL/L (ref 21–32)
CREAT SERPL-MCNC: 4.6 MG/DL (ref 0.6–1.2)
EOSINOPHILS ABSOLUTE: 0.1 K/UL (ref 0–0.6)
EOSINOPHILS RELATIVE PERCENT: 1 %
GFR AFRICAN AMERICAN: 11
GFR NON-AFRICAN AMERICAN: 9
GLOBULIN: 4.5 G/DL
GLUCOSE BLD-MCNC: 139 MG/DL (ref 70–99)
GLUCOSE BLD-MCNC: 148 MG/DL (ref 70–99)
GLUCOSE BLD-MCNC: 153 MG/DL (ref 70–99)
GLUCOSE BLD-MCNC: 194 MG/DL (ref 70–99)
GLUCOSE BLD-MCNC: 96 MG/DL (ref 70–99)
HCT VFR BLD CALC: 27 % (ref 36–48)
HEMOGLOBIN: 8.7 G/DL (ref 12–16)
LYMPHOCYTES ABSOLUTE: 0.8 K/UL (ref 1–5.1)
LYMPHOCYTES RELATIVE PERCENT: 11.2 %
MAGNESIUM: 1.7 MG/DL (ref 1.8–2.4)
MCH RBC QN AUTO: 30.6 PG (ref 26–34)
MCHC RBC AUTO-ENTMCNC: 32.4 G/DL (ref 31–36)
MCV RBC AUTO: 94.5 FL (ref 80–100)
MONOCYTES ABSOLUTE: 0.5 K/UL (ref 0–1.3)
MONOCYTES RELATIVE PERCENT: 7.3 %
NEUTROPHILS ABSOLUTE: 5.6 K/UL (ref 1.7–7.7)
NEUTROPHILS RELATIVE PERCENT: 79.8 %
PDW BLD-RTO: 18.3 % (ref 12.4–15.4)
PERFORMED ON: ABNORMAL
PERFORMED ON: NORMAL
PHOSPHORUS: 4.7 MG/DL (ref 2.5–4.9)
PLATELET # BLD: 190 K/UL (ref 135–450)
PMV BLD AUTO: 8.8 FL (ref 5–10.5)
POTASSIUM SERPL-SCNC: 4.1 MMOL/L (ref 3.5–5.1)
RBC # BLD: 2.86 M/UL (ref 4–5.2)
SODIUM BLD-SCNC: 142 MMOL/L (ref 136–145)
TOTAL PROTEIN: 7.5 G/DL (ref 6.4–8.2)
WBC # BLD: 7 K/UL (ref 4–11)

## 2021-10-02 PROCEDURE — 99233 SBSQ HOSP IP/OBS HIGH 50: CPT | Performed by: INTERNAL MEDICINE

## 2021-10-02 PROCEDURE — 6370000000 HC RX 637 (ALT 250 FOR IP)

## 2021-10-02 PROCEDURE — 94640 AIRWAY INHALATION TREATMENT: CPT

## 2021-10-02 PROCEDURE — 6370000000 HC RX 637 (ALT 250 FOR IP): Performed by: STUDENT IN AN ORGANIZED HEALTH CARE EDUCATION/TRAINING PROGRAM

## 2021-10-02 PROCEDURE — 94761 N-INVAS EAR/PLS OXIMETRY MLT: CPT

## 2021-10-02 PROCEDURE — 2700000000 HC OXYGEN THERAPY PER DAY

## 2021-10-02 PROCEDURE — 2580000003 HC RX 258: Performed by: STUDENT IN AN ORGANIZED HEALTH CARE EDUCATION/TRAINING PROGRAM

## 2021-10-02 PROCEDURE — 80053 COMPREHEN METABOLIC PANEL: CPT

## 2021-10-02 PROCEDURE — 92526 ORAL FUNCTION THERAPY: CPT

## 2021-10-02 PROCEDURE — 6360000002 HC RX W HCPCS: Performed by: STUDENT IN AN ORGANIZED HEALTH CARE EDUCATION/TRAINING PROGRAM

## 2021-10-02 PROCEDURE — 6360000002 HC RX W HCPCS: Performed by: INTERNAL MEDICINE

## 2021-10-02 PROCEDURE — 84100 ASSAY OF PHOSPHORUS: CPT

## 2021-10-02 PROCEDURE — 94664 DEMO&/EVAL PT USE INHALER: CPT

## 2021-10-02 PROCEDURE — 85025 COMPLETE CBC W/AUTO DIFF WBC: CPT

## 2021-10-02 PROCEDURE — 36415 COLL VENOUS BLD VENIPUNCTURE: CPT

## 2021-10-02 PROCEDURE — 2060000000 HC ICU INTERMEDIATE R&B

## 2021-10-02 PROCEDURE — 83735 ASSAY OF MAGNESIUM: CPT

## 2021-10-02 RX ADMIN — FUROSEMIDE 40 MG: 10 INJECTION, SOLUTION INTRAMUSCULAR; INTRAVENOUS at 22:56

## 2021-10-02 RX ADMIN — HEPARIN SODIUM 5000 UNITS: 5000 INJECTION INTRAVENOUS; SUBCUTANEOUS at 14:32

## 2021-10-02 RX ADMIN — FUROSEMIDE 40 MG: 10 INJECTION, SOLUTION INTRAMUSCULAR; INTRAVENOUS at 09:46

## 2021-10-02 RX ADMIN — INSULIN LISPRO 1 UNITS: 100 INJECTION, SOLUTION INTRAVENOUS; SUBCUTANEOUS at 09:47

## 2021-10-02 RX ADMIN — FAMOTIDINE 20 MG: 20 TABLET, FILM COATED ORAL at 09:47

## 2021-10-02 RX ADMIN — INSULIN LISPRO 1 UNITS: 100 INJECTION, SOLUTION INTRAVENOUS; SUBCUTANEOUS at 13:11

## 2021-10-02 RX ADMIN — INSULIN LISPRO 1 UNITS: 100 INJECTION, SOLUTION INTRAVENOUS; SUBCUTANEOUS at 17:55

## 2021-10-02 RX ADMIN — HEPARIN SODIUM 5000 UNITS: 5000 INJECTION INTRAVENOUS; SUBCUTANEOUS at 05:29

## 2021-10-02 RX ADMIN — Medication 10 ML: at 22:59

## 2021-10-02 RX ADMIN — FUROSEMIDE 40 MG: 10 INJECTION, SOLUTION INTRAMUSCULAR; INTRAVENOUS at 14:32

## 2021-10-02 RX ADMIN — HEPARIN SODIUM 5000 UNITS: 5000 INJECTION INTRAVENOUS; SUBCUTANEOUS at 22:59

## 2021-10-02 RX ADMIN — Medication 10 ML: at 09:52

## 2021-10-02 ASSESSMENT — PAIN SCALES - WONG BAKER
WONGBAKER_NUMERICALRESPONSE: 0

## 2021-10-02 ASSESSMENT — PAIN SCALES - GENERAL
PAINLEVEL_OUTOF10: 0

## 2021-10-02 NOTE — PLAN OF CARE
Problem: Falls - Risk of:  Goal: Will remain free from falls  Description: Will remain free from falls  Note: Bed low with wheels locked  bed alarm on  call light in reach at all times  continue fall prevention measures     Problem: Gas Exchange - Impaired  Goal: Absence of hypoxia  Note: Patient alert and orient x4  RR 16-18/, deep and regular  lungs diminished with auscultation  room air pulse ox 92-94%  continue assess      Problem:  Body Temperature -  Risk of, Imbalanced  Goal: Ability to maintain a body temperature within defined limits  Outcome: Met This Shift     Problem: Isolation Precautions - Risk of Spread of Infection  Goal: Prevent transmission of infection  Note: Patient remains droplet plus isolation per covid protocol

## 2021-10-02 NOTE — PROGRESS NOTES
Speech Language Pathology  Facility/Department: [facility]  Dysphagia Daily Treatment Note    NAME: Maira Cramer  : 1950  MRN: [id number]    Patient Diagnosis(es):   Patient Active Problem List    Diagnosis Date Noted    Septic shock ([facility] 2021    Acute cystitis without hematuria 2021    Pneumonia of left lower lobe due to infectious organism      novel coronavirus-infected pneumonia (NCIP)     Acute respiratory failure with hypoxia ([facility]     Acute renal failure ([facility] 09/15/2021     Recent Chest Xray 21      Coarse multifocal opacities most compatible with viral pneumonia. There is progression in the left lower lobe where there is obscuration of the left hemidiaphragm which is a new finding.       Stable cardiac mediastinal silhouette.       Lines and tubes without change.      Previous MBS; none  Chart reviewed. Medical Diagnosis: Metabolic Acidosis  Treatment Diagnosis: Dysphagia    BSE Impression :  21 Impression  Pt was intubated 9/15-21. Pt has fairly strong voice and strong cough and is coughing up secretions. Pt oriented to person and place and followed simple commands. Oral structures grossly intact, pt with absent top dentition, limited bottom teeth. Pt analyzed with ice chips, water via tsp/cup and straw, puree and solid. Adequate labial seal with cup and straw, no anterior loss. Prolonged mastication with solid, due to limited dentition and fatigue. Initially pt tolerated all with no overt signs of aspiration, with good swallow movement felt upon palpation of anterior neck. However as trials progressed, RR increased from lower 20's to 30. Pt began exhibiting coughing and required suctioning of secretions with yankauer. Discontinued further PO trials due to concern for pt safety  Recommend aggressive oral care with suction toothbrush, and ice chips/tsps of water for comfort and plan     MBS results - n/a at this time d/t COVID positive.  FEES if indicated    Pain:none    Current Diet : Full liquid diet; upgrade to dysphagia II (minced & moist ) + thin liquids (recommended /)     Treatment:  Pt seen bedside to address the following goals:  1-The patient/caregiver will demonstrate understanding of compensatory strategies for improved swallowing safety  ; Educated pt to purpose of visit, s/s of aspiration, concern if aspiration occurs, explanation as to how intubation can affect swallowing and rationale for NPO, oral care and ice chips. Pt will require cont education /reinforcement  Cont goal    :  Educated pt to reason for visit, what aspiration is, education on strategies for swallowing and diet trial recommendation. Pt highly impulsive with amount of intake despite max verbal cues so recommend nursing feed at this time. Pt requires further reinforcement and education (questionable full understanding and follow through). : pt educated to purpose of visit. Explained desire to assess solids, pt declined, stating she didn't feel like chewing anything. After several minutes into session, pt stated she just found out 2 of her brothers , stated they were found in their house. Pt states she thinks they had covid. Provided emotional support and D/w RN Anupam Amin. Cont goal  : Educated pt to current diet recommendation and recommendation for upgrade given tolerance of regular solid; pt expressed understanding and agreeable to diet upgrade this date. Expressed desire to remain on softer consistency, therefor recommending dysphagia II (minced & moist). Cont goal  :   Pt educated to purpose of visit, denies any problems with swallowing. Educated recommendation for upgrade of diet (not changed from last session). Reviewed strategies for improved safety of swallowing, including importance of sitting fully upright and ensuring clearance of oral cavity.  Pt stated comprehension  Cont goal    2- The patient will tolerate repeat BSE when able.    9/25: Pt alert, wanting to eat. Oral care given prior and after with suction swab. Pt with RR averaging around 25, occasional increase to 30 but decreased to 23 if took break. Pt analyzed with ice chips, water via cup/straw, puree and solid. Pt with 2 delayed coughs (around 45 seconds after po trials). No immediate cough/wet vocal quality or throat clearing with any po trials. Able to masticate solid (mildly prolonged due to large bite pt took despite verbal/tactile cues). Good swallow felt upon anterior neck. Recommend trial of full liquid diet with nursing staff feeding for monitoring amount of intake and toleration of diet. Goal met    3- Pt will consume PO safely without signs or symptoms of aspiration  9/27: pt analyzed with liquids via cup and straw including 3 ounces of uninterrupted swallows. No overt signs of aspiration with liquids, voice remained clear, good swallow movement upon palpation of anterior neck. Pt declined trials of soft solids, as noted in goal above. Pt stated she doesn't want anything to chew. Explained rationale to determine if able to upgrade diet. Pt cont to decline further trials. Cont full liquids with close monitoring for s/s of aspiration  Cont goal  9/28: Pt noted to have congested cough prior to any PO trials; strong and able to clear thick secretions. Observed w/ thin liquids, puree, and regular solid this date; no overt s/s of aspiration. Voice remained clear t/o w/o changes in respirations. Consumed regular solid w/ adequate mastication and clearance of oral cavity; trace lingual residue on L that independently cleared. Recommend upgrade to dysphagia II (minced & moist) + thin liquids w/ close monitoring of respiratory status. Tolerating current diet w/o difficulties; ongoing monitoring for instrumental.  Cont goal  9/29; d/w RN who states pt has had no respiratory decline, doing fairly well with swallowing.  diet remains full liquids, d/w RN recommendation to upgrade to minced and moist from last session. Pt analyzed with puree, thin liquids via cup and straw as well as soft solid. Pt demonstrated adequate labial seal with no anterior loss of bolus. No cough/throat clearing or change in voice occurred. Good swallow movement felt upon palpation of anterior neck. Pt demonstrated prolonged mastication with solid texture, mild lingual residue noted, cleared with liquid wash. Recommend dysphagia II minced and moist / thin liquids   Cont goal    Patient/Family/Caregiver Education:   As above    Compensatory Strategies:  PO with nursing staff feeding only (to control amount of intake and to monitor for any difficulties. Upright with po   Small amounts at slow rate    Plan:  Continued daily Dysphagia treatment with goals per  plan of care. Diet recommendations:  Upgrade to dysphagia II (minced & moist) + thin liquids If any s/s of aspiration or if lung status decline emerges, then d/c po until re-assessed by SLP  DC recommendation: TBD closer to discharge  Treatment:12  D/W nursing, Good and [address] 137 met prior to leaving room, call button in reach. Clemencia Marley M.S./[facility]SLP #[phone]  Pg.  # [phone]  If patient is discharged prior to next treatment, this note will serve as the discharge summary

## 2021-10-02 NOTE — PROGRESS NOTES
9/7/21   Historical Provider, MD   atenolol (TENORMIN) 25 MG tablet Take 25 mg by mouth daily    Historical Provider, MD   ibuprofen (ADVIL;MOTRIN) 600 MG tablet TAKE 1 TABLET BY MOUTH THREE TIMES A DAY WITH FOOD 8/2/21   Historical Provider, MD             Physical Exam:    Vitals: /69   Pulse 81   Temp 98.8 °F (37.1 °C) (Oral)   Resp 18   Ht 5' 4\" (1.626 m)   Wt 116 lb 6.5 oz (52.8 kg)   SpO2 94%   BMI 19.98 kg/m²   Constitutional:  On 4L NC  HEENT:  ETT in place  Neck: unable to assess JVD  Cardiovascular:  S1, S2 reg  Respiratory: symmetric lung expansion  Abdomen:  +BS, soft, ND  Ext: + lower extremity edema  Skin: dry/intact  CNS: sedated     CBC:   Recent Labs     09/30/21  0503 10/01/21  0526 10/02/21  0642   WBC 11.0 9.1 7.0   HGB 8.9* 8.5* 8.7*    200 190     BMP:    Recent Labs     09/30/21  0503 10/01/21  0526 10/02/21  0642    143 142   K 4.5 4.4 4.1    106 108   CO2 19* 20* 17*   BUN 55* 58* 56*   CREATININE 4.8* 4.8* 4.6*   GLUCOSE 137* 111* 139*     Hepatic:   Recent Labs     09/30/21  0503 10/01/21  0526 10/02/21  0642   AST 9* 12* 11*   ALT 15 15 19   BILITOT 0.3 0.3 0.3   ALKPHOS 117 80 90     Troponin:   No results for input(s): TROPONINI in the last 72 hours. BNP: No results for input(s): BNP in the last 72 hours. Lipids: No results for input(s): CHOL, HDL in the last 72 hours. Invalid input(s): LDLCALCU  ABGs:   Lab Results   Component Value Date    PHART 7.325 09/28/2021    PO2ART 68.0 09/28/2021    DXS8UCT 41.1 09/28/2021     INR: No results for input(s): INR in the last 72 hours. -----------------------------------------------------------------      Assessment and Plan   1. Acute Renal Failure   W/ ho CKD. Pt non compliant    2. Acid/Base electrolyte abnormalities    3. Anemia    4.  Covid-19 infection    Patient Active Problem List   Diagnosis Code    [facility] N17.9    2019 novel coronavirus-infected pneumonia (NCIP) U07.1, J12.82    Acute respiratory failure with hypoxia (HCC) J96.01    Septic shock (HCC) A41.9, R65.21    Acute cystitis without hematuria N30.00    Pneumonia of left lower lobe due to infectious organism J18.9       Plan   - HD as needed   - lasix IV   - UO good   - replace K as needed   - COVID -19 treatment per primary team        Maintain SBP> 90 mmHg   Daily weights   AVOID NSAIDs  Avoid Nephrotoxins  Monitor Intake/Output  Call if significant decrease in urine output         Thank you for allowing us to participate in care of Kamilah Machado MD   [facility] [address]  Office : [phone]  Fax :[phone]

## 2021-10-02 NOTE — PROGRESS NOTES
Progress Note  Admit Date: 9/15/2021              CC: F/U for COVID with respiratory failure    73 y.o. female with pmhx of CKD, current smoker, who presented with 3 days of SOB and increased work of breathing.     In the ED, patient tachypnic with accessory muscle use and hypoxic, SBP in 200s. Placed on Bipap per RT. Patient had lab work that was consistent with acute renal failure. Nephorology was consulted for CRRT. Patient was also severely acidotic and started on Bicarb gtt after one amp push. Broad spectrum abx were started in ED. Nitro gtt started.     Denies any sick contacts, not COVID vaccinated. Has not had any chest pain associated. No orthopnea, PND, swelling. Patient states that she has not had issues with breathing in past. Does not regularly follow with any physicians. Interval History:   She is feeling \"fine. \" On O2 this am. Says her cough is better. Eating well. Endorses constipation. Denies vomiting. Review of Systems - Negative except  As in HPI. Scheduled Medications:    famotidine  20 mg Oral Daily    furosemide  40 mg IntraVENous TID    albuterol-ipratropium  1 puff Inhalation TID    insulin lispro  0-6 Units SubCUTAneous TID WC    insulin lispro  0-3 Units SubCUTAneous Nightly    nicotine  1 patch TransDERmal Daily    sodium chloride flush  5-40 mL IntraVENous 2 times per day    heparin (porcine)  5,000 Units SubCUTAneous 3 times per day      PRN Medications: methyl salicylate-menthol, albuterol sulfate HFA, heparin (porcine), perflutren lipid microspheres, labetalol, OLANZapine, hydrALAZINE, glucose, dextrose, glucagon (rDNA), dextrose, sodium chloride flush, sodium chloride, ondansetron **OR** ondansetron, polyethylene glycol, acetaminophen **OR** acetaminophen  Diet: ADULT DIET;  Dysphagia - Minced and Moist  Adult Oral Nutrition Supplement; Diabetic Oral Supplement    Continuous Infusions:   dextrose 100 mL/hr (09/24/21 0528)    sodium chloride         PHYSICAL EXAM:  /69   Pulse 81   Temp 98.8 °F (37.1 °C) (Oral)   Resp 18   Ht 5' 4\" (1.626 m)   Wt 116 lb 6.5 oz (52.8 kg)   SpO2 94%   BMI 19.98 kg/m²   Recent Labs     10/01/21  0754 10/01/21  1206 10/01/21  1701 10/01/21  2230 10/02/21  0747   POCGLU 140* 204* 106* 200* 148*       Intake/Output Summary (Last 24 hours) at 10/2/2021 0858  Last data filed at 10/2/2021 0100  Gross per 24 hour   Intake 480 ml   Output 1300 ml   Net -820 ml       General appearance: alert, appears stated age and cooperative  Head: Normocephalic, without obvious abnormality, atraumatic  Lungs: Some mild wheezing. No rales  Heart: regular rate and rhythm, S1, S2 normal, no murmur, click, rub or gallop  Abdomen: Mild tenderness, LLQ. No rebound. BS present  Extremities: extremities normal, atraumatic, no cyanosis or edema  Pulses: 2+ and symmetric  Neurologic: Grossly normal    LABS:  Recent Labs     09/30/21  0503 10/01/21  0526 10/02/21  0642   WBC 11.0 9.1 7.0   HGB 8.9* 8.5* 8.7*   HCT 27.6* 26.6* 27.0*    200 190                                                                    Recent Labs     09/30/21  0503 10/01/21  0526 10/02/21  0642    143 142   K 4.5 4.4 4.1    106 108   CO2 19* 20* 17*   BUN 55* 58* 56*   CREATININE 4.8* 4.8* 4.6*   GLUCOSE 137* 111* 139*     Recent Labs     09/30/21  0503 10/01/21  0526 10/02/21  0642   AST 9* 12* 11*   ALT 15 15 19   BILITOT 0.3 0.3 0.3   ALKPHOS 117 80 90     No results for input(s): TROPONINI in the last 72 hours. No results for input(s): BNP in the last 72 hours. No results for input(s): CHOL, HDL in the last 72 hours. Invalid input(s): LDLCALCU  No results for input(s): INR in the last 72 hours. Assessment & Plan:    73 y.o. female with CKD, current smoker, admitted 9/15/2021 with COVID, resp failure.      Acute hypoxic/hypercapnic respiratory failure 2/2 Covid-19 PNA  COVID Pneumonia  Volume overload  - Extubated 9/23  - S/p decadron 20 mg I/V for five days (9/17 tp 9/21)  - S/p tocilizumab (9/16  - Will follow-up with Dr. Alyssia Vines as outpatient  - Still has some volume overload    Sepsis  - Likely viral sepsis sec to COVID  - Possible superimposed bacterial pneumonia  - Respiratory cultures on  9/22 were negative. - Weaned off vasopressors  - Completed empiric txt course with Zosyn (9/16 until 9/26)    Hemoptysis likely due to frequent cough  Pulmonology consulted, appreciate recs  She is not having further episodes  Supportive care at this time    HTN   - Continue antihypertensives  - Monitor Bps     Acute Anemia  Acute anemia possibly sec to sepsis, infection  Likely chronic component sec to CKD  Hgb stable    Acute renal failure  CKD  Volume overload  Pt is off CRRT  Diuresing with IV lasix  - May not need further HD to manage the pt's volume status if responds to diuretics: Defer to Nephro  - She may still need dialysis but hoping kidney function will continue to recover    Hyperglycemia likely sec to steroids  - Possible underlying DM, A1C 6.6  - LDSSI    Full Code   Disposition: Transferred from ICU 9/25/2021  PT/OT eval ongoing, planning for placement when stable from nephrology standpoint.    DVT ppx: Heparin subq     [place], DO

## 2021-10-02 NOTE — PROGRESS NOTES
time    Pain:none    Current Diet : Dysphagia II (minced & moist ) + thin liquids     Treatment:  Pt seen bedside to address the following goals:  1-The patient/caregiver will demonstrate understanding of compensatory strategies for improved swallowing safety  ; Educated pt to purpose of visit, s/s of aspiration, concern if aspiration occurs, explanation as to how intubation can affect swallowing and rationale for NPO, oral care and ice chips. Pt will require cont education /reinforcement  Cont goal    :  Educated pt to reason for visit, what aspiration is, education on strategies for swallowing and diet trial recommendation. Pt highly impulsive with amount of intake despite max verbal cues so recommend nursing feed at this time. Pt requires further reinforcement and education (questionable full understanding and follow through). : pt educated to purpose of visit. Explained desire to assess solids, pt declined, stating she didn't feel like chewing anything. After several minutes into session, pt stated she just found out 2 of her brothers , stated they were found in their house. Pt states she thinks they had covid. Provided emotional support and D/w RN Nahomi Mcintyre. Cont goal  : Educated pt to current diet recommendation and recommendation for upgrade given tolerance of regular solid; pt expressed understanding and agreeable to diet upgrade this date. Expressed desire to remain on softer consistency, therefor recommending dysphagia II (minced & moist). Cont goal  :   Pt educated to purpose of visit, denies any problems with swallowing. Educated recommendation for upgrade of diet (not changed from last session). Reviewed strategies for improved safety of swallowing, including importance of sitting fully upright and ensuring clearance of oral cavity.  Pt stated comprehension  Cont goal  10/2- Pt educated on risk of dysphagia/aspiration with COVID/SOB/acute O2 demands, importance of oral care. No oral care supplies in room. These were obtained at end of session and left in room at pt request on shelving. Educated on importance of external pacing to reduce risk of aspiration. Educated on role of SLP in dysphagia and assessing for diet advancement. The patient indicated that she does not desire to advance diet stating it is too much work to chew at this time but does endorse general diet at baseline. The patient reports she will go to SNF from hospital; educated on role of SLP in SNF in dysphagia. GOAL MET. 2- The patient will tolerate repeat BSE when able.    9/25: Pt alert, wanting to eat. Oral care given prior and after with suction swab. Pt with RR averaging around 25, occasional increase to 30 but decreased to 23 if took break. Pt analyzed with ice chips, water via cup/straw, puree and solid. Pt with 2 delayed coughs (around 45 seconds after po trials). No immediate cough/wet vocal quality or throat clearing with any po trials. Able to masticate solid (mildly prolonged due to large bite pt took despite verbal/tactile cues). Good swallow felt upon anterior neck. Recommend trial of full liquid diet with nursing staff feeding for monitoring amount of intake and toleration of diet. Goal met    3- Pt will consume PO safely without signs or symptoms of aspiration  9/27: pt analyzed with liquids via cup and straw including 3 ounces of uninterrupted swallows. No overt signs of aspiration with liquids, voice remained clear, good swallow movement upon palpation of anterior neck. Pt declined trials of soft solids, as noted in goal above. Pt stated she doesn't want anything to chew. Explained rationale to determine if able to upgrade diet. Pt cont to decline further trials. Cont full liquids with close monitoring for s/s of aspiration  Cont goal  9/28: Pt noted to have congested cough prior to any PO trials; strong and able to clear thick secretions.  Observed w/ thin liquids, puree, and regular solid this date; no overt s/s of aspiration. Voice remained clear t/o w/o changes in respirations. Consumed regular solid w/ adequate mastication and clearance of oral cavity; trace lingual residue on L that independently cleared. Recommend upgrade to dysphagia II (minced & moist) + thin liquids w/ close monitoring of respiratory status. Tolerating current diet w/o difficulties; ongoing monitoring for instrumental.  Cont goal  9/29; d/w RN who states pt has had no respiratory decline, doing fairly well with swallowing. diet remains full liquids, d/w RN recommendation to upgrade to minced and moist from last session. Pt analyzed with puree, thin liquids via cup and straw as well as soft solid. Pt demonstrated adequate labial seal with no anterior loss of bolus. No cough/throat clearing or change in voice occurred. Good swallow movement felt upon palpation of anterior neck. Pt demonstrated prolonged mastication with solid texture, mild lingual residue noted, cleared with liquid wash. Recommend dysphagia II minced and moist / thin liquids   Cont goal  10/2- Pt declines any PO this time despite encouragement stating she is full. Chart review does not reveal any documented difficulty on current recommended diet level. RN does endorse very audible swallow but no s/s associated with aspiration. The pt denies any perceptual dysphagia. GOAL MET. Patient/Family/Caregiver Education:   No family present - as above    Compensatory Strategies:  Upright with po   Small amounts at slow rate    Plan:  DISCHARGE secondary to diet tolerance established and pt does not desire diet advancement. Diet recommendations:  Dysphagia Minced and Moist / thins / meds whole with liquid  DC recommendation: Re-consult if s/s aspiration arise or if pt wants to be assessed for diet advancement. Treatment: 10 minutes  D/W nursingJet   Needs met prior to leaving room, call button in reach.     Sorin Santo M.A., [address] [facility] [id number]  Speech-Language Pathologist  Pg.  # [phone]

## 2021-10-02 NOTE — PROGRESS NOTES
RR 20-22, intermittent pulse ox 81% RA, checked on several fingers with reading remaining 81-83%  NC 2L oxygen applied, pulse ox 90-92%  Continue monitor

## 2021-10-03 LAB
A/G RATIO: 0.6 (ref 1.1–2.2)
ALBUMIN SERPL-MCNC: 2.9 G/DL (ref 3.4–5)
ALP BLD-CCNC: 99 U/L (ref 40–129)
ALT SERPL-CCNC: 27 U/L (ref 10–40)
ANION GAP SERPL CALCULATED.3IONS-SCNC: 18 MMOL/L (ref 3–16)
AST SERPL-CCNC: 16 U/L (ref 15–37)
BASOPHILS ABSOLUTE: 0 K/UL (ref 0–0.2)
BASOPHILS RELATIVE PERCENT: 0.7 %
BILIRUB SERPL-MCNC: 0.4 MG/DL (ref 0–1)
BUN BLDV-MCNC: 59 MG/DL (ref 7–20)
CALCIUM SERPL-MCNC: 9.4 MG/DL (ref 8.3–10.6)
CHLORIDE BLD-SCNC: 108 MMOL/L (ref 99–110)
CO2: 15 MMOL/L (ref 21–32)
CREAT SERPL-MCNC: 4.6 MG/DL (ref 0.6–1.2)
EOSINOPHILS ABSOLUTE: 0.1 K/UL (ref 0–0.6)
EOSINOPHILS RELATIVE PERCENT: 1.1 %
GFR AFRICAN AMERICAN: 11
GFR NON-AFRICAN AMERICAN: 9
GLOBULIN: 4.7 G/DL
GLUCOSE BLD-MCNC: 114 MG/DL (ref 70–99)
GLUCOSE BLD-MCNC: 122 MG/DL (ref 70–99)
GLUCOSE BLD-MCNC: 226 MG/DL (ref 70–99)
GLUCOSE BLD-MCNC: 265 MG/DL (ref 70–99)
HCT VFR BLD CALC: 26.6 % (ref 36–48)
HEMOGLOBIN: 8.7 G/DL (ref 12–16)
LYMPHOCYTES ABSOLUTE: 0.9 K/UL (ref 1–5.1)
LYMPHOCYTES RELATIVE PERCENT: 12.1 %
MAGNESIUM: 1.7 MG/DL (ref 1.8–2.4)
MCH RBC QN AUTO: 31.3 PG (ref 26–34)
MCHC RBC AUTO-ENTMCNC: 32.7 G/DL (ref 31–36)
MCV RBC AUTO: 95.6 FL (ref 80–100)
MONOCYTES ABSOLUTE: 0.5 K/UL (ref 0–1.3)
MONOCYTES RELATIVE PERCENT: 6.2 %
NEUTROPHILS ABSOLUTE: 5.9 K/UL (ref 1.7–7.7)
NEUTROPHILS RELATIVE PERCENT: 79.9 %
PDW BLD-RTO: 18.5 % (ref 12.4–15.4)
PERFORMED ON: ABNORMAL
PHOSPHORUS: 5.1 MG/DL (ref 2.5–4.9)
PLATELET # BLD: 172 K/UL (ref 135–450)
PMV BLD AUTO: 9 FL (ref 5–10.5)
POTASSIUM SERPL-SCNC: 4.8 MMOL/L (ref 3.5–5.1)
RBC # BLD: 2.78 M/UL (ref 4–5.2)
SODIUM BLD-SCNC: 141 MMOL/L (ref 136–145)
TOTAL PROTEIN: 7.6 G/DL (ref 6.4–8.2)
WBC # BLD: 7.4 K/UL (ref 4–11)

## 2021-10-03 PROCEDURE — 83735 ASSAY OF MAGNESIUM: CPT

## 2021-10-03 PROCEDURE — 94640 AIRWAY INHALATION TREATMENT: CPT

## 2021-10-03 PROCEDURE — 2580000003 HC RX 258: Performed by: STUDENT IN AN ORGANIZED HEALTH CARE EDUCATION/TRAINING PROGRAM

## 2021-10-03 PROCEDURE — 84100 ASSAY OF PHOSPHORUS: CPT

## 2021-10-03 PROCEDURE — 2060000000 HC ICU INTERMEDIATE R&B

## 2021-10-03 PROCEDURE — 6370000000 HC RX 637 (ALT 250 FOR IP): Performed by: INTERNAL MEDICINE

## 2021-10-03 PROCEDURE — 80053 COMPREHEN METABOLIC PANEL: CPT

## 2021-10-03 PROCEDURE — 36415 COLL VENOUS BLD VENIPUNCTURE: CPT

## 2021-10-03 PROCEDURE — 99233 SBSQ HOSP IP/OBS HIGH 50: CPT | Performed by: INTERNAL MEDICINE

## 2021-10-03 PROCEDURE — 6360000002 HC RX W HCPCS: Performed by: INTERNAL MEDICINE

## 2021-10-03 PROCEDURE — 85025 COMPLETE CBC W/AUTO DIFF WBC: CPT

## 2021-10-03 PROCEDURE — 6370000000 HC RX 637 (ALT 250 FOR IP)

## 2021-10-03 PROCEDURE — 6370000000 HC RX 637 (ALT 250 FOR IP): Performed by: STUDENT IN AN ORGANIZED HEALTH CARE EDUCATION/TRAINING PROGRAM

## 2021-10-03 PROCEDURE — 6360000002 HC RX W HCPCS: Performed by: STUDENT IN AN ORGANIZED HEALTH CARE EDUCATION/TRAINING PROGRAM

## 2021-10-03 RX ORDER — SODIUM BICARBONATE 650 MG/1
650 TABLET ORAL 3 TIMES DAILY
Status: DISCONTINUED | OUTPATIENT
Start: 2021-10-03 | End: 2021-10-05 | Stop reason: HOSPADM

## 2021-10-03 RX ADMIN — FAMOTIDINE 20 MG: 20 TABLET, FILM COATED ORAL at 08:54

## 2021-10-03 RX ADMIN — INSULIN LISPRO 2 UNITS: 100 INJECTION, SOLUTION INTRAVENOUS; SUBCUTANEOUS at 13:10

## 2021-10-03 RX ADMIN — SODIUM BICARBONATE 650 MG: 650 TABLET ORAL at 21:19

## 2021-10-03 RX ADMIN — INSULIN LISPRO 2 UNITS: 100 INJECTION, SOLUTION INTRAVENOUS; SUBCUTANEOUS at 21:23

## 2021-10-03 RX ADMIN — HEPARIN SODIUM 5000 UNITS: 5000 INJECTION INTRAVENOUS; SUBCUTANEOUS at 21:20

## 2021-10-03 RX ADMIN — Medication 10 ML: at 08:56

## 2021-10-03 RX ADMIN — FUROSEMIDE 40 MG: 10 INJECTION, SOLUTION INTRAMUSCULAR; INTRAVENOUS at 21:20

## 2021-10-03 RX ADMIN — FUROSEMIDE 40 MG: 10 INJECTION, SOLUTION INTRAMUSCULAR; INTRAVENOUS at 14:05

## 2021-10-03 RX ADMIN — Medication 10 ML: at 21:19

## 2021-10-03 RX ADMIN — FUROSEMIDE 40 MG: 10 INJECTION, SOLUTION INTRAMUSCULAR; INTRAVENOUS at 08:54

## 2021-10-03 RX ADMIN — HEPARIN SODIUM 5000 UNITS: 5000 INJECTION INTRAVENOUS; SUBCUTANEOUS at 06:30

## 2021-10-03 RX ADMIN — SODIUM BICARBONATE 650 MG: 650 TABLET ORAL at 14:05

## 2021-10-03 RX ADMIN — Medication 10 ML: at 22:45

## 2021-10-03 RX ADMIN — Medication 10 ML: at 21:30

## 2021-10-03 RX ADMIN — HEPARIN SODIUM 5000 UNITS: 5000 INJECTION INTRAVENOUS; SUBCUTANEOUS at 14:05

## 2021-10-03 ASSESSMENT — PAIN SCALES - GENERAL
PAINLEVEL_OUTOF10: 0

## 2021-10-03 NOTE — RT PROTOCOL NOTE
RT Inhaler-Nebulizer Bronchodilator Protocol Note    There is a bronchodilator order in the chart from a provider indicating to follow the RT Bronchodilator Protocol and there is an Initiate RT Inhaler-Nebulizer Bronchodilator Protocol order as well (see protocol at bottom of note). CXR Findings:  No results found. The findings from the last RT Protocol Assessment were as follows:   History Pulmonary Disease: Smoker 15 pack years or more  Respiratory Pattern: Regular pattern and RR 12-20 bpm  Breath Sounds: Clear breath sounds  Cough: Strong, spontaneous, non-productive  Indication for Bronchodilator Therapy: Decreased or absent breath sounds, On home bronchodilators  Bronchodilator Assessment Score: 1    Aerosolized bronchodilator medication orders have been revised according to the RT Inhaler-Nebulizer Bronchodilator Protocol below. Respiratory Therapist to perform RT Therapy Protocol Assessment initially then follow the protocol. Repeat RT Therapy Protocol Assessment PRN for score 0-3 or on second treatment, BID, and PRN for scores above 3. No Indications - adjust the frequency to every 6 hours PRN wheezing or bronchospasm, if no treatments needed after 48 hours then discontinue using Per Protocol order mode. If indication present, adjust the RT bronchodilator orders based on the Bronchodilator Assessment Score as indicated below. Use Inhaler orders unless patient has one or more of the following: on home nebulizer, not able to hold breath for 10 seconds, is not alert and oriented, cannot activate and use MDI correctly, or respiratory rate 25 breaths per minute or more, then use the equivalent nebulizer order(s) with same Frequency and PRN reasons based on the score. If a patient is on this medication at home then do not decrease Frequency below that used at home.     0-3 - enter or revise RT bronchodilator order(s) to equivalent RT Bronchodilator order with Frequency of every 4 hours PRN for

## 2021-10-03 NOTE — PROGRESS NOTES
Progress Note  Admit Date: 9/15/2021              CC: F/U for COVID with respiratory failure    73 y.o. female with pmhx of CKD, current smoker, who presented with 3 days of SOB and increased work of breathing.     In the ED, patient tachypnic with accessory muscle use and hypoxic, SBP in 200s. Placed on Bipap per RT. Patient had lab work that was consistent with acute renal failure. Nephorology was consulted for CRRT. Patient was also severely acidotic and started on Bicarb gtt after one amp push. Broad spectrum abx were started in ED. Nitro gtt started.     Denies any sick contacts, not COVID vaccinated. Has not had any chest pain associated. No orthopnea, PND, swelling. Patient states that she has not had issues with breathing in past. Does not regularly follow with any physicians. Interval History:   Denies any new complaints, feels tired. Review of Systems - Negative except  As in HPI. Scheduled Medications:    influenza virus vaccine  0.5 mL IntraMUSCular Prior to discharge    famotidine  20 mg Oral Daily    furosemide  40 mg IntraVENous TID    albuterol-ipratropium  1 puff Inhalation TID    insulin lispro  0-6 Units SubCUTAneous TID WC    insulin lispro  0-3 Units SubCUTAneous Nightly    nicotine  1 patch TransDERmal Daily    sodium chloride flush  5-40 mL IntraVENous 2 times per day    heparin (porcine)  5,000 Units SubCUTAneous 3 times per day      PRN Medications: methyl salicylate-menthol, albuterol sulfate HFA, heparin (porcine), perflutren lipid microspheres, labetalol, OLANZapine, hydrALAZINE, glucose, dextrose, glucagon (rDNA), dextrose, sodium chloride flush, sodium chloride, ondansetron **OR** ondansetron, polyethylene glycol, acetaminophen **OR** acetaminophen  Diet: ADULT DIET;  Dysphagia - Minced and Moist  Adult Oral Nutrition Supplement; Diabetic Oral Supplement    Continuous Infusions:   dextrose 100 mL/hr (09/24/21 0528)    sodium chloride         PHYSICAL EXAM:  BP 108/72   Pulse 72   Temp 98 °F (36.7 °C) (Oral)   Resp 18   Ht 5' 4\" (1.626 m)   Wt 119 lb 0.8 oz (54 kg)   SpO2 90%   BMI 20.43 kg/m²   Recent Labs     10/01/21  2230 10/02/21  0747 10/02/21  1208 10/02/21  1753 10/02/21  2259   POCGLU 200* 148* 194* 153* 96       Intake/Output Summary (Last 24 hours) at 10/3/2021 6193  Last data filed at 10/3/2021 0400  Gross per 24 hour   Intake 600 ml   Output 700 ml   Net -100 ml       General appearance: alert, appears stated age and cooperative  Head: Normocephalic, without obvious abnormality, atraumatic  Lungs: Some mild wheezing. No rales  Heart: regular rate and rhythm, S1, S2 normal, no murmur, click, rub or gallop  Abdomen: Mild tenderness, LLQ. No rebound. BS present  Extremities: extremities normal, atraumatic, no cyanosis or edema  Pulses: 2+ and symmetric  Neurologic: Grossly normal    LABS:  Recent Labs     10/01/21  0526 10/02/21  0642 10/03/21  0548   WBC 9.1 7.0 7.4   HGB 8.5* 8.7* 8.7*   HCT 26.6* 27.0* 26.6*    190 172                                                                    Recent Labs     10/01/21  0526 10/02/21  0642 10/03/21  0548    142 141   K 4.4 4.1 4.8    108 108   CO2 20* 17* 15*   BUN 58* 56* 59*   CREATININE 4.8* 4.6* 4.6*   GLUCOSE 111* 139* 122*     Recent Labs     10/01/21  0526 10/02/21  0642 10/03/21  0548   AST 12* 11* 16   ALT 15 19 27   BILITOT 0.3 0.3 0.4   ALKPHOS 80 90 99     No results for input(s): TROPONINI in the last 72 hours. No results for input(s): BNP in the last 72 hours. No results for input(s): CHOL, HDL in the last 72 hours. Invalid input(s): LDLCALCU  No results for input(s): INR in the last 72 hours. Assessment & Plan:    73 y.o. female with CKD, current smoker, admitted 9/15/2021 with COVID, resp failure.      Acute hypoxic/hypercapnic respiratory failure 2/2 Covid-19 PNA  COVID Pneumonia  Volume overload  - Extubated 9/23  - S/p decadron 20 mg I/V for five days (9/17 tp 9/21)  - S/p tocilizumab (9/16  - Will follow-up with Dr. Arelis Koehler as outpatient  - Still has volume overload and has had recurrent O2 req  - On IV lasix  - Weaned to room air    Sepsis  - Likely viral sepsis sec to COVID  - Possible superimposed bacterial pneumonia  - Respiratory cultures on  9/22 were negative. - Weaned off vasopressors  - Completed empiric txt course with Zosyn (9/16 until 9/26)    Hemoptysis likely due to frequent cough  Pulmonology consulted, appreciate recs  She is not having further episodes  Supportive care at this time    HTN   - Continue antihypertensives  - Monitor Bps     Acute Anemia  Acute anemia possibly sec to sepsis, infection  Likely chronic component sec to CKD  Hgb stable    Acute renal failure  CKD  Volume overload  Pt is off CRRT  Diuresing with IV lasix  - May not need further HD to manage the pt's volume status if responds to diuretics: Defer to Nephro  - She may still need dialysis but hoping kidney function will continue to recover    Hyperglycemia likely sec to steroids  - Possible underlying DM, A1C 6.6  - LDSSI    Full Code   Disposition: Transferred from ICU 9/25/2021  PT/OT eval ongoing, planning for placement when stable from nephrology standpoint. Currently on IV lasix.    DVT ppx: Heparin subq     Liset ,

## 2021-10-03 NOTE — PROGRESS NOTES
Janette Jefferson : [phone]     Fax :[phone]        Renal  Note    Patient:  Deandre Jones  MRN: [id number]    CHIEF COMPLAINT:  SOB, chills        10/03/21    BP stable. UO much better   On lasix . UOP is not accurate because of external catheter    I/O last 3 completed shifts: In: 600 [P.O.:600]  Out: 700 [Urine:700]  No intake/output data recorded. Past Medical History:     OA (osteoarthritis)    Hypertension    Personal history of tobacco use, presenting hazards to health    Lower limb length difference    Current smoker    Renal and perinephric abscess    Acute renal failure (HCC)    Anemia, unspecified    Routine adult health maintenance    Peptic ulcer, unspecified site, unspecified as acute or chronic, without mention of hemorrhage, perforation, or obstruction    Right hip pain    DDD (degenerative disc disease), lumbosacral    External thrombosed hemorrhoids    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 Medications Prior to Admission:    Prior to Admission medications    Medication Sig Start Date End Date Taking?  Authorizing Provider   albuterol sulfate  (90 Base) MCG/ACT inhaler  9/7/21   Historical Provider, MD   atenolol (TENORMIN) 25 MG tablet Take 25 mg by mouth daily    Historical Provider, MD   ibuprofen (ADVIL;MOTRIN) 600 MG tablet TAKE 1 TABLET BY MOUTH THREE TIMES A DAY WITH FOOD 8/2/21   Historical Provider, MD             Physical Exam:    Vitals: /72   Pulse 72   Temp 98 °F (36.7 °C) (Oral)   Resp 18   Ht 5' 4\" (1.626 m)   Wt 119 lb 0.8 oz (54 kg)   SpO2 93%   BMI 20.43 kg/m²   Constitutional:  On 4L NC  HEENT:  ETT in place  Neck: unable to assess JVD  Cardiovascular:  S1, S2 reg  Respiratory: symmetric lung expansion  Abdomen:  +BS, soft, ND  Ext: + lower extremity edema  Skin: dry/intact  CNS: sedated     CBC:   Recent Labs     10/01/21  0526 10/02/21  0642 10/03/21  0548   WBC 9.1 7.0 7.4   HGB 8.5* 8.7* 8.7*    190 172     BMP:    Recent Labs     10/01/21  0526 10/02/21  0642 10/03/21  0548    142 141   K 4.4 4.1 4.8    108 108   CO2 20* 17* 15*   BUN 58* 56* 59*   CREATININE 4.8* 4.6* 4.6*   GLUCOSE 111* 139* 122*     Hepatic:   Recent Labs     10/01/21  0526 10/02/21  0642 10/03/21  0548   AST 12* 11* 16   ALT 15 19 27   BILITOT 0.3 0.3 0.4   ALKPHOS 80 90 99     Troponin:   No results for input(s): TROPONINI in the last 72 hours. BNP: No results for input(s): BNP in the last 72 hours. Lipids: No results for input(s): CHOL, HDL in the last 72 hours. Invalid input(s): LDLCALCU  ABGs:   Lab Results   Component Value Date    PHART 7.325 09/28/2021    PO2ART 68.0 09/28/2021    AFN6WHK 41.1 09/28/2021     INR: No results for input(s): INR in the last 72 hours. -----------------------------------------------------------------      Assessment and Plan   1. Acute Renal Failure   W/ ho CKD. Pt non compliant    2. Acid/Base electrolyte abnormalities- Metabolic acidosis     3. Anemia    4.  Covid-19 infection    Patient Active Problem List   Diagnosis Code    Acute renal failure ([facility] [address] N17.9    2019 novel coronavirus-infected pneumonia ([facility]) U07.1, J12.82    Acute respiratory failure with hypoxia ([address] J96.01    Septic shock (HCC) A41.9, R65.21    Acute cystitis without hematuria N30.00    Pneumonia of left lower lobe due to infectious organism J18.9       Plan   - HD as needed . No need today  - continue lasix IV   - UO good   - replace K as needed   - COVID -19 treatment per primary team  - add sodium bicarbonate 650 mg po tid       Maintain SBP> 90 mmHg   Daily weights   AVOID NSAIDs  Avoid Nephrotoxins  Monitor Intake/Output  Call if significant decrease in urine output         Thank you for allowing us to participate in care of Mary Ford MD   [facility] [address]  Office : [phone]  Fax :[phone]

## 2021-10-03 NOTE — PROGRESS NOTES
10/03/21 0851   RT Protocol   History Pulmonary Disease 1   Respiratory pattern 0   Breath sounds 0   Cough 0   Bronchodilator Assessment Score 1

## 2021-10-04 LAB
A/G RATIO: 0.6 (ref 1.1–2.2)
ALBUMIN SERPL-MCNC: 3.1 G/DL (ref 3.4–5)
ALP BLD-CCNC: 92 U/L (ref 40–129)
ALT SERPL-CCNC: 38 U/L (ref 10–40)
ANION GAP SERPL CALCULATED.3IONS-SCNC: 16 MMOL/L (ref 3–16)
AST SERPL-CCNC: 20 U/L (ref 15–37)
BASOPHILS ABSOLUTE: 0.1 K/UL (ref 0–0.2)
BASOPHILS RELATIVE PERCENT: 0.8 %
BILIRUB SERPL-MCNC: 0.3 MG/DL (ref 0–1)
BUN BLDV-MCNC: 61 MG/DL (ref 7–20)
CALCIUM SERPL-MCNC: 9.4 MG/DL (ref 8.3–10.6)
CHLORIDE BLD-SCNC: 107 MMOL/L (ref 99–110)
CO2: 17 MMOL/L (ref 21–32)
CREAT SERPL-MCNC: 4.6 MG/DL (ref 0.6–1.2)
EOSINOPHILS ABSOLUTE: 0.2 K/UL (ref 0–0.6)
EOSINOPHILS RELATIVE PERCENT: 2.4 %
GFR AFRICAN AMERICAN: 11
GFR NON-AFRICAN AMERICAN: 9
GLOBULIN: 5 G/DL
GLUCOSE BLD-MCNC: 105 MG/DL (ref 70–99)
GLUCOSE BLD-MCNC: 121 MG/DL (ref 70–99)
GLUCOSE BLD-MCNC: 141 MG/DL (ref 70–99)
GLUCOSE BLD-MCNC: 180 MG/DL (ref 70–99)
GLUCOSE BLD-MCNC: 181 MG/DL (ref 70–99)
GLUCOSE BLD-MCNC: 250 MG/DL (ref 70–99)
HCT VFR BLD CALC: 28.6 % (ref 36–48)
HEMOGLOBIN: 9.1 G/DL (ref 12–16)
LYMPHOCYTES ABSOLUTE: 1.1 K/UL (ref 1–5.1)
LYMPHOCYTES RELATIVE PERCENT: 13.4 %
MAGNESIUM: 1.8 MG/DL (ref 1.8–2.4)
MCH RBC QN AUTO: 30.6 PG (ref 26–34)
MCHC RBC AUTO-ENTMCNC: 32 G/DL (ref 31–36)
MCV RBC AUTO: 95.7 FL (ref 80–100)
MONOCYTES ABSOLUTE: 0.4 K/UL (ref 0–1.3)
MONOCYTES RELATIVE PERCENT: 5.2 %
NEUTROPHILS ABSOLUTE: 6.2 K/UL (ref 1.7–7.7)
NEUTROPHILS RELATIVE PERCENT: 78.2 %
PDW BLD-RTO: 18.5 % (ref 12.4–15.4)
PERFORMED ON: ABNORMAL
PHOSPHORUS: 5 MG/DL (ref 2.5–4.9)
PLATELET # BLD: 159 K/UL (ref 135–450)
PMV BLD AUTO: 8.7 FL (ref 5–10.5)
POTASSIUM SERPL-SCNC: 4.9 MMOL/L (ref 3.5–5.1)
RBC # BLD: 2.99 M/UL (ref 4–5.2)
SODIUM BLD-SCNC: 140 MMOL/L (ref 136–145)
TOTAL PROTEIN: 8.1 G/DL (ref 6.4–8.2)
WBC # BLD: 8 K/UL (ref 4–11)

## 2021-10-04 PROCEDURE — 6370000000 HC RX 637 (ALT 250 FOR IP)

## 2021-10-04 PROCEDURE — 2060000000 HC ICU INTERMEDIATE R&B

## 2021-10-04 PROCEDURE — 83735 ASSAY OF MAGNESIUM: CPT

## 2021-10-04 PROCEDURE — 99233 SBSQ HOSP IP/OBS HIGH 50: CPT | Performed by: INTERNAL MEDICINE

## 2021-10-04 PROCEDURE — 6370000000 HC RX 637 (ALT 250 FOR IP): Performed by: INTERNAL MEDICINE

## 2021-10-04 PROCEDURE — 85025 COMPLETE CBC W/AUTO DIFF WBC: CPT

## 2021-10-04 PROCEDURE — 6370000000 HC RX 637 (ALT 250 FOR IP): Performed by: STUDENT IN AN ORGANIZED HEALTH CARE EDUCATION/TRAINING PROGRAM

## 2021-10-04 PROCEDURE — 80053 COMPREHEN METABOLIC PANEL: CPT

## 2021-10-04 PROCEDURE — 6360000002 HC RX W HCPCS: Performed by: STUDENT IN AN ORGANIZED HEALTH CARE EDUCATION/TRAINING PROGRAM

## 2021-10-04 PROCEDURE — 2580000003 HC RX 258: Performed by: STUDENT IN AN ORGANIZED HEALTH CARE EDUCATION/TRAINING PROGRAM

## 2021-10-04 PROCEDURE — 6360000002 HC RX W HCPCS: Performed by: INTERNAL MEDICINE

## 2021-10-04 PROCEDURE — 36415 COLL VENOUS BLD VENIPUNCTURE: CPT

## 2021-10-04 PROCEDURE — 84100 ASSAY OF PHOSPHORUS: CPT

## 2021-10-04 RX ADMIN — INSULIN LISPRO 1 UNITS: 100 INJECTION, SOLUTION INTRAVENOUS; SUBCUTANEOUS at 09:08

## 2021-10-04 RX ADMIN — INSULIN LISPRO 1 UNITS: 100 INJECTION, SOLUTION INTRAVENOUS; SUBCUTANEOUS at 22:45

## 2021-10-04 RX ADMIN — HEPARIN SODIUM 5000 UNITS: 5000 INJECTION INTRAVENOUS; SUBCUTANEOUS at 14:59

## 2021-10-04 RX ADMIN — SODIUM BICARBONATE 650 MG: 650 TABLET ORAL at 22:46

## 2021-10-04 RX ADMIN — Medication 10 ML: at 22:45

## 2021-10-04 RX ADMIN — FAMOTIDINE 20 MG: 20 TABLET, FILM COATED ORAL at 09:07

## 2021-10-04 RX ADMIN — SODIUM BICARBONATE 650 MG: 650 TABLET ORAL at 14:59

## 2021-10-04 RX ADMIN — INSULIN LISPRO 3 UNITS: 100 INJECTION, SOLUTION INTRAVENOUS; SUBCUTANEOUS at 12:15

## 2021-10-04 RX ADMIN — HEPARIN SODIUM 5000 UNITS: 5000 INJECTION INTRAVENOUS; SUBCUTANEOUS at 04:48

## 2021-10-04 RX ADMIN — FUROSEMIDE 40 MG: 10 INJECTION, SOLUTION INTRAMUSCULAR; INTRAVENOUS at 09:07

## 2021-10-04 RX ADMIN — SODIUM BICARBONATE 650 MG: 650 TABLET ORAL at 09:07

## 2021-10-04 RX ADMIN — Medication 10 ML: at 09:08

## 2021-10-04 RX ADMIN — HEPARIN SODIUM 5000 UNITS: 5000 INJECTION INTRAVENOUS; SUBCUTANEOUS at 22:44

## 2021-10-04 RX ADMIN — INSULIN LISPRO 1 UNITS: 100 INJECTION, SOLUTION INTRAVENOUS; SUBCUTANEOUS at 17:46

## 2021-10-04 ASSESSMENT — PAIN SCALES - GENERAL
PAINLEVEL_OUTOF10: 0

## 2021-10-04 NOTE — PROGRESS NOTES
Comprehensive Nutrition Assessment    RD did not conduct direct, in-person nutrition evaluation in efforts to reduce exposure and use of PPE for high risk persons, PUI persons, patients who have tested positive for Covid-19 or those awaiting respiratory panel results. EMR was screened for nutrition risk factors, as defined per nutrition standards of care. RECOMMENDATIONS:  1. PO Diet: Continue diet per SLP- dysphagia minced and moist; low K  2. ONS- Continue renal oral nutrition supplement with meals to promote adequate nutrient intakes    NUTRITION ASSESSMENT:   Type and Reason for Visit:  Type and Reason for Visit: Reassess   Nutritional summary & status: Po intakes continue to vary per EMR; avg of ~50% meal intakes. Unable to reach pt by phone, called but no answer. Trending wt loss since adm; likely d/t fluid shifts as pt on lasix and receiving HD as needed. Noted MD switched pt to renal ons. Continue to send to increase protein/calorie intakes. Patient admitted d/t SOB, COVID with respiratory failure    PMH significant for: CKD, current smoker    MALNUTRITION ASSESSMENT  Context of Malnutrition: Acute Illness   Malnutrition Status:  At risk for malnutrition (Comment)  Findings of the 6 clinical characteristics of malnutrition (Minimum of 2 out of 6 clinical characteristics is required to make the diagnosis of moderate or severe Protein Calorie Malnutrition based on AND/ASPEN Guidelines):  Energy Intake: Less than/equal to 50% of estimated energy requirements    Energy Intake Time: Greater than or equal to 7 days   Weight Loss %: Unable to assess    Weight loss Time: Unable to assess     NUTRITION DIAGNOSIS   · Inadequate oral intake related to impaired respiratory function as evidenced by intake 0-25%, swallow study results    [address] and/or Nutrient Delivery:  Continue Current Diet, Continue Oral Nutrition Supplement  Nutrition Education/Counseling:  No recommendation at this time Goals:  pt will tolerate PO diet per SLP to consume greater than 75% of meals and supplements offered through admission       Nutrition Monitoring and Evaluation:   Food/Nutrient Intake Outcomes:  Diet Advancement/Tolerance, Food and Nutrient Intake  Physical Signs/Symptoms Outcomes:  Biochemical Data, Weight, Chewing or Swallowing   OBJECTIVE DATA: Significant to nutrition assessment  · Nutrition-Focused Physical Findings: Nutrition Related Findings: BM 10/4  · Labs: Reviewed; POC Glu 114-250, HbA1c 6.6% (9/16/21)  · Meds: Reviewed  · Wounds: Wound Type: None     CURRENT NUTRITION THERAPIES  ADULT DIET; Dysphagia - Minced and Moist; Low Potassium (Less than 3000 mg/day)  Adult Oral Nutrition Supplement; Renal Oral Supplement   PO Intake: Average Meal Intake: 26-50%, 51-75%   PO Supplement Intake:Average Supplements Intake: Unable to assess  IVF: n/a     ANTHROPOMETRICS  Current Height: 5' 4\" (162.6 cm)  Current Weight: 117 lb 15.1 oz (53.5 kg)    Admission weight: 140 lb (63.5 kg)  Ideal Body Weight (lbs) (Calculated): 120 lbs (Ideal Body Weight (Kg) (Calculated): 55 kg  Usual Bodyweight SHELLEY - no wt hx   Weight Changes SHELLEY         BMI (Calculated): 20.3    Wt Readings from Last 50 Encounters:   10/04/21 117 lb 15.1 oz (53.5 kg)       COMPARATIVE STANDARDS  Energy (kcal):  5006-6900 kcal/d (25-30 kcal/kg); Weight Used for Energy Requirements:  Current     Protein (g):  79-92 g/d (1.3-1.5 g/kg); Weight Used for Protein Requirements:  Current      Fluid (ml/day):  3425-9901 mL/d; Method Used for Fluid Requirements:  1 ml/kcal      The patient will still be monitored per nutrition standards of care. Consult dietitian if nutrition interventions essential to patient care is needed.      Beatriz Reed, [address] [place] [address]: [phone]  Office:  [phone]

## 2021-10-04 NOTE — CARE COORDINATION
Patient is accepted and plans for discharge to SNF at [facility], no pre-cert needed, will need a plan from nephrology if dialysis is needed. Will need hens and transport at discharge.     Kavitha Hutchison RN, BSN,   4th Floor Progressive Care Unit  [phone]

## 2021-10-04 NOTE — FLOWSHEET NOTE
10/04/21 1458   Encounter Summary   Services provided to: Patient   Referral/Consult From: Nurse   Continue Visiting   (10/4, jacqueline )   Complexity of Encounter Moderate   Length of Encounter 15 minutes   Routine   Type Initial   Assessment Calm; Approachable   Intervention Active listening   Outcome Comfort;Expressed gratitude   Staff Marshall [place]

## 2021-10-04 NOTE — PLAN OF CARE
Problem: Falls - Risk of:  Goal: Will remain free from falls  Description: Will remain free from falls  10/4/2021 1356 by Katt Sandra RN  Note: Call light in reach at all times  bed low with wheels locked  bed alarm on  OOB staff assist x2  side rails up x2  continue fall prevention measures     Problem: Airway Clearance - Ineffective  Goal: Achieve or maintain patent airway  10/4/2021 1356 by Katt Sandra RN  Outcome: Met This Shift     Problem: Gas Exchange - Impaired  Goal: Absence of hypoxia  10/4/2021 1356 by Katt Sandra RN  Note: No s/s hypoxia, continue assess     Problem: Breathing Pattern - Ineffective  Goal: Ability to achieve and maintain a regular respiratory rate  10/4/2021 1356 by Katt Sandra RN  Note: BRYANT and at rest  lungs diminished throughout with auscultation  RR 18-20  shallow, nonlabored  room air pulse ox low to mid 90's  assist ADL for energy conservation  continue monitor and assess     Problem: Body Temperature -  Risk of, Imbalanced  Goal: Ability to maintain a body temperature within defined limits  10/4/2021 1356 by Katt Sandra RN  Outcome: Met This Shift     Problem:  Body Temperature -  Risk of, Imbalanced  Goal: Will regain or maintain usual level of consciousness  10/4/2021 1356 by Katt Sandra RN  Outcome: Met This Shift     Problem: Skin Integrity:  Goal: Absence of new skin breakdown  Description: Absence of new skin breakdown  Note: Vilma care per staff  assist patient turn and reposition moisture barrier to vilma area prn  continue assess tissue integrity

## 2021-10-04 NOTE — PLAN OF CARE
Problem: Nutrition  Goal: Optimal nutrition therapy  Note: Nutrition Problem #1: Inadequate oral intake  Intervention: Food and/or Nutrient Delivery: Continue Current Diet, Continue Oral Nutrition Supplement  Nutritional Goals: pt will tolerate PO diet per SLP to consume greater than 75% of meals and supplements offered through admission

## 2021-10-04 NOTE — PROGRESS NOTES
Pulse 103   Temp 98.3 °F (36.8 °C) (Oral)   Resp 18   Ht 5' 4\" (1.626 m)   Wt 117 lb 15.1 oz (53.5 kg)   SpO2 94%   BMI 20.25 kg/m²   Recent Labs     10/03/21  2112 10/04/21  0743 10/04/21  1157 10/04/21  1742 10/04/21  1838   POCGLU 265* 141* 250* 181* 105*       Intake/Output Summary (Last 24 hours) at 10/4/2021 1922  Last data filed at 10/4/2021 1743  Gross per 24 hour   Intake 980 ml   Output 850 ml   Net 130 ml       General appearance: alert, appears stated age and cooperative  Head: Normocephalic, without obvious abnormality, atraumatic  Lungs: Some mild wheezing. No rales  Heart: regular rate and rhythm, S1, S2 normal, no murmur, click, rub or gallop  Abdomen: Mild tenderness, LLQ. No rebound. BS present  Extremities: extremities normal, atraumatic, no cyanosis or edema  Pulses: 2+ and symmetric  Neurologic: Grossly normal    LABS:  Recent Labs     10/02/21  0642 10/03/21  0548 10/04/21  0515   WBC 7.0 7.4 8.0   HGB 8.7* 8.7* 9.1*   HCT 27.0* 26.6* 28.6*    172 159                                                                    Recent Labs     10/02/21  0642 10/03/21  0548 10/04/21  0515    141 140   K 4.1 4.8 4.9    108 107   CO2 17* 15* 17*   BUN 56* 59* 61*   CREATININE 4.6* 4.6* 4.6*   GLUCOSE 139* 122* 121*     Recent Labs     10/02/21  0642 10/03/21  0548 10/04/21  0515   AST 11* 16 20   ALT 19 27 38   BILITOT 0.3 0.4 0.3   ALKPHOS 90 99 92     No results for input(s): TROPONINI in the last 72 hours. No results for input(s): BNP in the last 72 hours. No results for input(s): CHOL, HDL in the last 72 hours. Invalid input(s): LDLCALCU  No results for input(s): INR in the last 72 hours. Assessment & Plan:    73 y.o. female with CKD, current smoker, admitted 9/15/2021 with COVID, resp failure.      Acute hypoxic/hypercapnic respiratory failure 2/2 Covid-19 PNA  COVID Pneumonia  Volume overload  - Extubated 9/23  - S/p decadron 20 mg I/V for five days (9/17 tp 9/21)  - S/p tocilizumab (9/16  - Will follow-up with Dr. Vinh Ortiz as outpatient  - Still has volume overload and has had recurrent O2 req  - On IV lasix  - Weaned to room air    Sepsis  - Likely viral sepsis sec to COVID  - Possible superimposed bacterial pneumonia  - Respiratory cultures on  9/22 were negative. - Weaned off vasopressors  - Completed empiric txt course with Zosyn (9/16 until 9/26)    Hemoptysis likely due to frequent cough  Pulmonology consulted, appreciate recs  She is not having further episodes  Supportive care at this time    HTN   - Continue antihypertensives  - Monitor Bps     Acute Anemia  Acute anemia possibly sec to sepsis, infection  Likely chronic component sec to CKD  Hgb stable    Acute renal failure  CKD; probably stage 4  Volume overload  - No recent Cr PTA, but has Cr 1.7-2.7 in 2017  - Pt is off CRRT  - Diuresing with IV lasix  - May not need further HD to manage the pt's volume status if responds to diuretics: Defer to Nephro  - She may still need dialysis but hoping kidney function will continue to recover    Hyperglycemia likely sec to steroids  - Possible underlying DM, A1C 6.6  - LDSSI      Full Code       Disposition: Transferred from ICU 9/25/2021  -Medically stable for discharge. - Discussed with nephrology. Still hoping to avoid HD at this time. Her Cr appears plateauxed at 4.6 but Potassium seems to be rising in spite of lasix at 4.9 today.    - Immediate plans likely to be clearer with AM labs tomorrow.  - PT/OT eval : Planned SNF at [facility], no pre-cert needed      Gilberto Barone MD

## 2021-10-05 VITALS
HEIGHT: 64 IN | HEART RATE: 107 BPM | WEIGHT: 127.65 LBS | RESPIRATION RATE: 16 BRPM | SYSTOLIC BLOOD PRESSURE: 112 MMHG | BODY MASS INDEX: 21.79 KG/M2 | DIASTOLIC BLOOD PRESSURE: 69 MMHG | OXYGEN SATURATION: 94 % | TEMPERATURE: 97.6 F

## 2021-10-05 LAB
ANION GAP SERPL CALCULATED.3IONS-SCNC: 14 MMOL/L (ref 3–16)
BUN BLDV-MCNC: 59 MG/DL (ref 7–20)
CALCIUM SERPL-MCNC: 9.6 MG/DL (ref 8.3–10.6)
CHLORIDE BLD-SCNC: 106 MMOL/L (ref 99–110)
CO2: 19 MMOL/L (ref 21–32)
CREAT SERPL-MCNC: 4.2 MG/DL (ref 0.6–1.2)
GFR AFRICAN AMERICAN: 13
GFR NON-AFRICAN AMERICAN: 10
GLUCOSE BLD-MCNC: 129 MG/DL (ref 70–99)
GLUCOSE BLD-MCNC: 135 MG/DL (ref 70–99)
GLUCOSE BLD-MCNC: 231 MG/DL (ref 70–99)
HCT VFR BLD CALC: 25.2 % (ref 36–48)
HEMOGLOBIN: 8.4 G/DL (ref 12–16)
MAGNESIUM: 1.7 MG/DL (ref 1.8–2.4)
MCH RBC QN AUTO: 31.5 PG (ref 26–34)
MCHC RBC AUTO-ENTMCNC: 33.2 G/DL (ref 31–36)
MCV RBC AUTO: 95 FL (ref 80–100)
PDW BLD-RTO: 18.5 % (ref 12.4–15.4)
PERFORMED ON: ABNORMAL
PERFORMED ON: ABNORMAL
PHOSPHORUS: 4.2 MG/DL (ref 2.5–4.9)
PLATELET # BLD: 154 K/UL (ref 135–450)
PMV BLD AUTO: 8.4 FL (ref 5–10.5)
POTASSIUM SERPL-SCNC: 5.2 MMOL/L (ref 3.5–5.1)
RBC # BLD: 2.66 M/UL (ref 4–5.2)
SODIUM BLD-SCNC: 139 MMOL/L (ref 136–145)
WBC # BLD: 7.4 K/UL (ref 4–11)

## 2021-10-05 PROCEDURE — 2580000003 HC RX 258: Performed by: STUDENT IN AN ORGANIZED HEALTH CARE EDUCATION/TRAINING PROGRAM

## 2021-10-05 PROCEDURE — 99233 SBSQ HOSP IP/OBS HIGH 50: CPT | Performed by: INTERNAL MEDICINE

## 2021-10-05 PROCEDURE — 6360000002 HC RX W HCPCS: Performed by: STUDENT IN AN ORGANIZED HEALTH CARE EDUCATION/TRAINING PROGRAM

## 2021-10-05 PROCEDURE — 80048 BASIC METABOLIC PNL TOTAL CA: CPT

## 2021-10-05 PROCEDURE — 97110 THERAPEUTIC EXERCISES: CPT

## 2021-10-05 PROCEDURE — 6370000000 HC RX 637 (ALT 250 FOR IP): Performed by: STUDENT IN AN ORGANIZED HEALTH CARE EDUCATION/TRAINING PROGRAM

## 2021-10-05 PROCEDURE — 83735 ASSAY OF MAGNESIUM: CPT

## 2021-10-05 PROCEDURE — 36415 COLL VENOUS BLD VENIPUNCTURE: CPT

## 2021-10-05 PROCEDURE — 85027 COMPLETE CBC AUTOMATED: CPT

## 2021-10-05 PROCEDURE — 6370000000 HC RX 637 (ALT 250 FOR IP): Performed by: INTERNAL MEDICINE

## 2021-10-05 PROCEDURE — 84100 ASSAY OF PHOSPHORUS: CPT

## 2021-10-05 PROCEDURE — 6370000000 HC RX 637 (ALT 250 FOR IP)

## 2021-10-05 PROCEDURE — 6360000002 HC RX W HCPCS: Performed by: INTERNAL MEDICINE

## 2021-10-05 RX ORDER — MAGNESIUM SULFATE IN WATER 40 MG/ML
2000 INJECTION, SOLUTION INTRAVENOUS ONCE
Status: COMPLETED | OUTPATIENT
Start: 2021-10-05 | End: 2021-10-05

## 2021-10-05 RX ORDER — SODIUM BICARBONATE 650 MG/1
650 TABLET ORAL 3 TIMES DAILY
Qty: 90 TABLET | Refills: 3
Start: 2021-10-05 | End: 2022-04-12 | Stop reason: SDUPTHER

## 2021-10-05 RX ORDER — POLYETHYLENE GLYCOL 3350 17 G/17G
17 POWDER, FOR SOLUTION ORAL DAILY PRN
Qty: 527 G | Refills: 1 | Status: SHIPPED | OUTPATIENT
Start: 2021-10-05 | End: 2021-11-04

## 2021-10-05 RX ORDER — FAMOTIDINE 20 MG/1
20 TABLET, FILM COATED ORAL DAILY
Qty: 60 TABLET | Refills: 3 | Status: SHIPPED | OUTPATIENT
Start: 2021-10-06 | End: 2022-04-12 | Stop reason: ALTCHOICE

## 2021-10-05 RX ADMIN — HEPARIN SODIUM 5000 UNITS: 5000 INJECTION INTRAVENOUS; SUBCUTANEOUS at 14:34

## 2021-10-05 RX ADMIN — SODIUM BICARBONATE 650 MG: 650 TABLET ORAL at 14:34

## 2021-10-05 RX ADMIN — HEPARIN SODIUM 5000 UNITS: 5000 INJECTION INTRAVENOUS; SUBCUTANEOUS at 07:00

## 2021-10-05 RX ADMIN — SODIUM BICARBONATE 650 MG: 650 TABLET ORAL at 09:06

## 2021-10-05 RX ADMIN — MAGNESIUM SULFATE HEPTAHYDRATE 2000 MG: 2 INJECTION, SOLUTION INTRAVENOUS at 12:37

## 2021-10-05 RX ADMIN — INSULIN LISPRO 2 UNITS: 100 INJECTION, SOLUTION INTRAVENOUS; SUBCUTANEOUS at 12:06

## 2021-10-05 RX ADMIN — Medication 10 ML: at 09:06

## 2021-10-05 RX ADMIN — FAMOTIDINE 20 MG: 20 TABLET, FILM COATED ORAL at 09:06

## 2021-10-05 ASSESSMENT — PAIN SCALES - GENERAL
PAINLEVEL_OUTOF10: 0

## 2021-10-05 NOTE — PROGRESS NOTES
Physical Therapy  Daily Treatment Note    Discharge Recommendations: Kiki Slaughter scored a 8/24 on the AM-PAC short mobility form. Current research shows that an AM-PAC score of 17 or less is typically not associated with a discharge to the patient's home setting. Based on the patient's AM-PAC score and their current functional mobility deficits, it is recommended that the patient have 3-5 sessions per week of Physical Therapy at d/c to increase the patient's independence. Please see assessment section for further patient specific details. Assessment:  Pt with minimal participation for exercises this session despite heavy encouragement. (\"I just want to sleep! \") Pt would benefit from continued IP PT at D/C to maximize strength and independence, assuming that pt is agreeable to mobilizing. Plan is for SNF. Equipment Needs: Defer to next level of care    Chart Reviewed: Yes   Restrictions/Precautions: Fall Risk Other position/activity restrictions: strict I&O, up with assistance, adult diet - full liquid, droplet plus precautions, covid +   Additional Pertinent Hx: Pt to ED 9/15 with respiratory distress. Pt admitted to ICU for metabolic acidosis on vent and CRRT. COVID -19 (+). Pt extubated 9/23. CRRT stopped 9/23. Diagnosis: Metabolic Acidosis   Treatment Diagnosis: impaired functional mobility    Subjective: Pt in bed. Sleeping initially, but easily awakened. Needing heavy encouragement to participate with exercises. \"How about you just let me sleep. \"    Pain: No c/o voiced    Objective:    Exercises  10 reps B ankle pumps, heel slides, hip abd/add (AAROM--pt needing heavy encouragement/cues to participate)  5 reps B gentle heel cord stretch. Patient Education  Role of PT. Importance of mobility to regain strength and function. Safety Devices  Pt left with needs in reach. In bed with bed alarm on. RN updated.      AM-PAC score  AM-PAC Inpatient Mobility Raw Score : 8  AM-PAC Inpatient T-Scale Score : 28.52  Mobility Inpatient CMS 0-100% Score: 86.62  Mobility Inpatient CMS G-Code Modifier : CM    Goals: (as determined and assessed by primary PT)  Time Frame for Short term goals: Discharge  Short term goal 1: supine <> sit supervision   Short term goal 2: sit <> stand supervision   Short term goal 3: bed <> chair supervision  Short term goal 4: ambulate 100ft with rolling walker supervision     Plan:  Times per week: 2-5;    Current Treatment Recommendations: Transfer Training, Gait Training, Functional Mobility Training, Strengthening, ROM, Balance Training, Home Exercise Program, Safety Education & Training, Patient/Caregiver Education & Training, Equipment Evaluation, Education, & procurement, Endurance Training    Therapy Time    Individual  Concurrent  Group  Co-treatment    Time In  1447            Time Out  1510            Minutes  23              Timed Code Treatment Minutes: 23  Total Treatment Minutes: 23    Will continue per plan of care if not D/Cesar to SNF later today. If patient is discharged prior to next treatment, this note will serve as the discharge summary.     Delicia Curran #[id number]

## 2021-10-05 NOTE — DISCHARGE SUMMARY
Hospital Discharge Summary    Patient's PCP: Referring Not In System (Inactive)  Admit Date: 9/15/2021   Discharge Date: 10/5/2021    Admitting Physician: Dr. Brennan Diamond MD  Discharge Physician: Dr. Ramakrishna Walter MD   Consults: pulmonary/intensive care and nephrology    HPI: 73 y.o. female with pmhx of CKD, current smoker, who presented with 3 days of SOB and increased work of breathing.     In the ED, patient tachypnic with accessory muscle use and hypoxic, SBP in 200s. Placed on Bipap per RT. Patient had lab work that was consistent with acute renal failure. Nephorology was consulted for CRRT. Patient was also severely acidotic and started on Bicarb gtt after one amp push. Broad spectrum abx were started in ED. Nitro gtt started.     Denies any sick contacts, not COVID vaccinated. Has not had any chest pain associated. No orthopnea, PND, swelling. Patient states that she has not had issues with breathing in past. Does not regularly follow with any physicians. Brief hospital course:  Given the concern of the patients presentation and the concern of the possible multi-factorial etiology contributing to patients symptomatology. Patient was admitted and evaluated and found to have:       Discharge Diagnoses:   Acute hypoxic/hypercapnic respiratory failure 2/2 Covid-19 PNA  COVID Pneumonia  Volume overload  - Extubated 9/23  - S/p decadron 20 mg I/V for five days (9/17 tp 9/21)  - S/p tocilizumab (9/16  - Will follow-up with Dr. Edmundo Portillo as outpatient  - Still has volume overload and has had recurrent O2 req  - Diuresed on IV lasix  - Weaned to room air       Sepsis  - Likely viral sepsis sec to COVID  - Possible superimposed bacterial pneumonia  - Respiratory cultures on  9/22 were negative.    - Weaned off vasopressors  - Completed empiric txt course with Zosyn (9/16 until 9/26)  - Resolved       Hemoptysis likely due to frequent cough  Pulmonology consulted, appreciate recs  She is not having further episodes  Supportive care at this time       HTN   - Continue antihypertensives       Acute Anemia  Acute anemia possibly sec to sepsis, infection  Likely chronic component sec to CKD  Hgb stable       Acute renal failure  CKD; probably stage 4  Volume overload  Hyperkalemia  - No recent Cr PTA, but has Cr 1.7-2.7 in 2017  - Pt is off CRRT  - Diuresed well with IV lasix  - May not need further HD to manage the pt's volume status if responds to diuretics: Defer to Nephro: OK to DC per nephrology  - O/P BMP  - Low K diet.     Hyperglycemia likely sec to steroids  - Possible underlying DM, A1C 6.6  - Diabetic diet. Physical Exam: /82   Pulse 105   Temp 98.1 °F (36.7 °C) (Oral)   Resp 18   Ht 5' 4\" (1.626 m)   Wt 127 lb 10.3 oz (57.9 kg)   SpO2 94%   BMI 21.91 kg/m²     Recent Labs     10/04/21  1742 10/04/21  1838 10/04/21  2238 10/05/21  0841 10/05/21  1159   POCGLU 181* 105* 180* 135* 231*       General appearance: alert, appears stated age and cooperative  Head: Normocephalic, without obvious abnormality, atraumatic  Lungs: CTAB. No rales  Heart: regular rate and rhythm, S1, S2 normal, no murmur, click, rub or gallop  Abdomen: Mild tenderness, LLQ. No rebound. BS present  Extremities: extremities normal, atraumatic, no cyanosis or edema  Pulses: 2+ and symmetric  Neurologic: Grossly normal        LABS:  Recent Labs     10/05/21  0504   WBC 7.4   HGB 8.4*         Recent Labs     10/05/21  0504      K 5.2*      CO2 19*   BUN 59*   CREATININE 4.2*   GLUCOSE 129*     No results for input(s): INR in the last 72 hours.         Discharge Medications:   Eve Oz   Home Medication Instructions LPV:[id number]    Printed on:10/05/21 9183   Medication Information                      albuterol sulfate  (90 Base) MCG/ACT inhaler               albuterol-ipratropium (COMBIVENT RESPIMAT)  MCG/ACT AERS inhaler  Inhale 1 puff into the lungs every 4 hours as needed for Wheezing             atenolol (TENORMIN) 25 MG tablet  Take 25 mg by mouth daily             famotidine (PEPCID) 20 MG tablet  Take 1 tablet by mouth daily             polyethylene glycol (GLYCOLAX) 17 g packet  Take 17 g by mouth daily as needed for Constipation             sodium bicarbonate 650 MG tablet  Take 1 tablet by mouth 3 times daily                Activity: activity as tolerated  Diet: diabetic diet and renal diet  Wound Care: none needed    Disposition: SNF  Discharged Condition: Stable  Follow Up: Primary Care Physician in one week    Total time spent on discharge, finalizing medications, referrals and arranging outpatient follow up was more than 45 minutes    Thank you Dr. Long Not In System (Inactive) for the opportunity to be involved in this patients care. If you have any questions or concerns please feel free to contact me at [phone].

## 2021-10-05 NOTE — PLAN OF CARE
Problem: Falls - Risk of:  Goal: Will remain free from falls  Description: Will remain free from falls  Note: Pt is a Fall Risk. See Marshall Stanton Fall Risk Score. Pt bed in low position and side rails up. Call light and belongings in reach. Pt encouraged to call for assistance. Will continue with hourly rounds for PO intake, pain needs, toileting, and repositioning as needed. Problem: Airway Clearance - Ineffective  Goal: Achieve or maintain patent airway  Note: SpO2 >90% on RA so far this shift. No reports of SOB. Problem: Skin Integrity:  Goal: Will show no infection signs and symptoms  Description: Will show no infection signs and symptoms  Note: Q2 turns remain in place. Jigna care provided as needed for incont. Episodes.

## 2021-10-05 NOTE — CARE COORDINATION
Case Management Assessment            Discharge Note                    Date / Time of Note: 10/5/2021 2:54 PM                  Discharge Note Completed by: James Chavarria RN    Patient Name: Darian Waldron   YOB: 1950  Diagnosis: Metabolic acidosis [S07.4]  Acute renal failure (ARF) ([facility] [N17.9]  Acute respiratory failure with hypoxia ([facility] [J96.01]  Acute renal failure, unspecified acute renal failure type ([address] [N17.9]  Pneumonia of left lower lobe due to infectious organism [J18.9]  Suspected COVID-19 virus infection [Z20.822]   Date / Time: 9/15/2021  8:00 PM    Current PCP: Referring Not In System (Inactive)  Clinic patient: No    Hospitalization in the last 30 days: No    Advance Directives:  Code Status: Full Code  [place] DNR form completed and on chart: Not Indicated    Financial:  Payor: Darius Guillen / Plan: [place] / Product Type: *No Product type* /      Pharmacy:    [facility] [address] [place] [address] [phone] [facility] [phone]  [facility]  Phone: [phone] Fax: [phone]      Assistance purchasing medications?: Potential Assistance Purchasing Medications: Yes  Assistance provided by Case Management: None at this time    Does patient want to participate in local refill/ meds to beds program?: Not Assessed    Meds To Beds General Rules:  1. Can ONLY be done Monday- Friday between 8:30am-5pm  2. Prescription(s) must be in pharmacy by 3pm to be filled same day  3. Copy of patient's insurance/ prescription drug card and patient face sheet must be sent along with the prescription(s)  4. Cost of Rx cannot be added to hospital bill. If financial assistance is needed, please contact unit  or ;  or  CANNOT provide pharmacy voucher for patients co-pays  5.  Patients can then  the prescription on their way out of the hospital at discharge, or pharmacy can deliver to the bedside if staff is available. (payment due at time of pick-up or delivery - cash, check, or card accepted)     Able to afford home medications/ co-pay costs: Yes    ADLS:  Current PT AM-PAC Score: 8 /24  Current OT AM-PAC Score: 14 /24      DISCHARGE Disposition: [place] (SNF): [facility] Phone: [phone] Fax: [phone]    LOC at discharge: Skilled  [address] [place] Completed: Yes    Notification completed in HENS/PAS?:  Yes : CM has completed HENS online through secure website for SNF admission at [facility]. Document ID #: [id number]    IMM Completed:   Yes, Case management has presented and reviewed IMM letter #2 to the patient and/or family/ POA. Patient and/or family/POA verbalized understanding of their medicare rights and appeal process if needed. Patient and/or family/POA has signed, initialed and placed today's date (10/5/21) and time () on IMM letter #2 on the the appropriate lines. Patient and/or family/POA, copy of letter offered and they are aware that this original copy of IMM letter #2 is available prior to discharge from the paper chart on the unit. Electronic documentation has been entered into epic for IMM letter #2 and original paper copy has been added to the paper chart at the nurses station.      Transportation:  Transportation PLAN for discharge: EMS transportation   Mode of Transport: Ambulance stretcher - BLS  Reason for medical transport: Special handling en route- isolation precautions  Name of [address]: [facility]  Phone: [phone]  Time of Transport: 1800    Transport form completed: Yes    Home Care:  [address] ordered at discharge: Not [address]: Not Applicable  Orders faxed: No    Durable Medical Equipment:  DME Provider: n/a  Equipment obtained during hospitalization: defer    Home Oxygen and Respiratory Equipment:  Oxygen needed at discharge?: Not [address]: Not Applicable  Portable tank

## 2021-10-05 NOTE — PROGRESS NOTES
Pt discharged in stable condition to facility w/ transport provided by EMS. Pt changed prior to d/c. IV and tele removed. All belongings left with patient.

## 2021-10-05 NOTE — PROGRESS NOTES
R IJ NTDC line DC'd per physician order. Trialysis line intact, tip verified intact. Exit site clean, dry. Held light firm pressure 5 minutes. Dressing applied. CDI. Bedside with pt 10 more min, site CDI.    Report given to primary nurse Moirah CHRISTIANSON

## 2021-10-06 NOTE — PROGRESS NOTES
[facility] : [phone]     Fax :[phone]        Renal  Note    Patient:  Deon Arzate  MRN: [id number]    CHIEF COMPLAINT:  SOB, chills        10/05/21    BP stable. UO much better   Cr better     UOP is not accurate because of external catheter    I/O last 3 completed shifts: In: 360 [P.O.:360]  Out: 790 [Urine:790]  No intake/output data recorded. Past Medical History:     OA (osteoarthritis)    Hypertension    Personal history of tobacco use, presenting hazards to health    Lower limb length difference    Current smoker    Renal and perinephric abscess    Acute renal failure (HCC)    Anemia, unspecified    Routine adult health maintenance    Peptic ulcer, unspecified site, unspecified as acute or chronic, without mention of hemorrhage, perforation, or obstruction    Right hip pain    DDD (degenerative disc disease), lumbosacral    External thrombosed hemorrhoids    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 Medications Prior to Admission:    Prior to Admission medications    Medication Sig Start Date End Date Taking?  Authorizing Provider   sodium bicarbonate 650 MG tablet Take 1 tablet by mouth 3 times daily 10/5/21  Yes Lisy MD Franci   albuterol-ipratropium (COMBIVENT RESPIMAT)  MCG/ACT AERS inhaler Inhale 1 puff into the lungs every 4 hours as needed for Wheezing 10/5/21  Yes Brenda Lorenzana MD   famotidine (PEPCID) 20 MG tablet Take 1 tablet by mouth daily 10/6/21  Yes Brenda Lorenzana MD   polyethylene glycol (GLYCOLAX) 17 g packet Take 17 g by mouth daily as needed for Constipation 10/5/21 11/4/21 Yes Brenda Lorenzana MD   albuterol sulfate  (90 Base) MCG/ACT inhaler  9/7/21   Historical Provider, MD   atenolol (TENORMIN) 25 MG tablet Take 25 mg by mouth daily    Historical Provider, MD             Physical Exam:    Vitals: /69   Pulse 107   Temp 97.6 °F (36.4 °C)   Resp 16   Ht 5' 4\" (1.626 m)   Wt 127 lb 10.3 oz (57.9 kg)   SpO2 94%   BMI 21.91 kg/m²   Constitutional:  On 4L NC  HEENT:  ETT in place  Neck: unable to assess JVD  Cardiovascular:  S1, S2 reg  Respiratory: symmetric lung expansion  Abdomen:  +BS, soft, ND  Ext: + lower extremity edema  Skin: dry/intact  CNS: sedated     CBC:   Recent Labs     10/03/21  0548 10/04/21  0515 10/05/21  0504   WBC 7.4 8.0 7.4   HGB 8.7* 9.1* 8.4*    159 154     BMP:    Recent Labs     10/03/21  0548 10/04/21  0515 10/05/21  0504    140 139   K 4.8 4.9 5.2*    107 106   CO2 15* 17* 19*   BUN 59* 61* 59*   CREATININE 4.6* 4.6* 4.2*   GLUCOSE 122* 121* 129*     Hepatic:   Recent Labs     10/03/21  0548 10/04/21  0515   AST 16 20   ALT 27 38   BILITOT 0.4 0.3   ALKPHOS 99 92     Troponin:   No results for input(s): TROPONINI in the last 72 hours. BNP: No results for input(s): BNP in the last 72 hours. Lipids: No results for input(s): CHOL, HDL in the last 72 hours.     Invalid input(s): LDLCALCU  ABGs:   Lab Results   Component Value Date    PHART 7.325 09/28/2021    PO2ART 68.0 09/28/2021    SKH6VYS 41.1 09/28/2021     INR: No results for input(s): INR in the last 72 hours.  -----------------------------------------------------------------      Assessment and Plan   1. Acute Renal Failure   W/ ho CKD. Pt non compliant    2. Acid/Base electrolyte abnormalities- Metabolic acidosis     3. Anemia    4. Covid-19 infection    Patient Active Problem List   Diagnosis Code    Acute renal failure ([facility] N17.9    2019 novel coronavirus-infected pneumonia ([facility]) U07.1, J12.82    Acute respiratory failure with hypoxia ([facility]) J96.01    Septic shock ([facility]) A41.9, R65.21    Acute cystitis without hematuria N30.00    Pneumonia of left lower lobe due to infectious organism J18.9       Plan   - Cr better   - removed HD cath   - UO good   - COVID -19 treatment per primary team  - added sodium bicarbonate 650 mg po tid       Maintain SBP> 90 mmHg   Daily weights   AVOID NSAIDs  Avoid Nephrotoxins  Monitor Intake/Output  Call if significant decrease in urine output         Thank you for allowing us to participate in care of [facility] FLO Gary MD   [facility] [facility] [address]  Office : [phone]  Fax :[phone]